# Patient Record
Sex: MALE | Race: WHITE | NOT HISPANIC OR LATINO | Employment: FULL TIME | URBAN - METROPOLITAN AREA
[De-identification: names, ages, dates, MRNs, and addresses within clinical notes are randomized per-mention and may not be internally consistent; named-entity substitution may affect disease eponyms.]

---

## 2018-02-14 LAB — HBA1C MFR BLD HPLC: 7.8 %

## 2018-04-04 DIAGNOSIS — E11.42 DIABETIC POLYNEUROPATHY ASSOCIATED WITH TYPE 2 DIABETES MELLITUS (HCC): Primary | ICD-10-CM

## 2018-04-04 RX ORDER — GABAPENTIN 300 MG/1
CAPSULE ORAL
Qty: 120 CAPSULE | Refills: 5 | Status: SHIPPED | OUTPATIENT
Start: 2018-04-04 | End: 2018-07-30 | Stop reason: SDUPTHER

## 2018-04-20 DIAGNOSIS — E11.9 TYPE 2 DIABETES MELLITUS WITHOUT COMPLICATION, WITHOUT LONG-TERM CURRENT USE OF INSULIN (HCC): Primary | ICD-10-CM

## 2018-07-21 DIAGNOSIS — E11.9 TYPE 2 DIABETES MELLITUS WITHOUT COMPLICATION, WITHOUT LONG-TERM CURRENT USE OF INSULIN (HCC): ICD-10-CM

## 2018-07-23 RX ORDER — DAPAGLIFLOZIN 10 MG/1
10 TABLET, FILM COATED ORAL DAILY
Qty: 90 TABLET | Refills: 0 | Status: SHIPPED | OUTPATIENT
Start: 2018-07-23 | End: 2018-10-08 | Stop reason: SDUPTHER

## 2018-07-30 ENCOUNTER — OFFICE VISIT (OUTPATIENT)
Dept: ENDOCRINOLOGY | Facility: HOSPITAL | Age: 53
End: 2018-07-30
Payer: COMMERCIAL

## 2018-07-30 VITALS
WEIGHT: 227.6 LBS | HEART RATE: 86 BPM | HEIGHT: 67 IN | SYSTOLIC BLOOD PRESSURE: 118 MMHG | BODY MASS INDEX: 35.72 KG/M2 | DIASTOLIC BLOOD PRESSURE: 74 MMHG

## 2018-07-30 DIAGNOSIS — E11.42 DIABETIC POLYNEUROPATHY ASSOCIATED WITH TYPE 2 DIABETES MELLITUS (HCC): ICD-10-CM

## 2018-07-30 DIAGNOSIS — E78.5 HYPERLIPIDEMIA, UNSPECIFIED HYPERLIPIDEMIA TYPE: ICD-10-CM

## 2018-07-30 DIAGNOSIS — I10 HYPERTENSION, UNSPECIFIED TYPE: ICD-10-CM

## 2018-07-30 DIAGNOSIS — E11.65 TYPE 2 DIABETES MELLITUS WITH HYPERGLYCEMIA, WITHOUT LONG-TERM CURRENT USE OF INSULIN (HCC): Primary | ICD-10-CM

## 2018-07-30 PROCEDURE — 99204 OFFICE O/P NEW MOD 45 MIN: CPT | Performed by: INTERNAL MEDICINE

## 2018-07-30 RX ORDER — GLIPIZIDE 5 MG/1
TABLET ORAL
Refills: 4 | COMMUNITY
Start: 2018-07-14 | End: 2018-11-12 | Stop reason: SDUPTHER

## 2018-07-30 RX ORDER — GABAPENTIN 300 MG/1
CAPSULE ORAL
Qty: 360 CAPSULE | Refills: 3 | Status: SHIPPED | OUTPATIENT
Start: 2018-07-30 | End: 2019-08-08 | Stop reason: SDUPTHER

## 2018-07-30 RX ORDER — LISINOPRIL AND HYDROCHLOROTHIAZIDE 12.5; 1 MG/1; MG/1
1 TABLET ORAL DAILY
COMMUNITY

## 2018-07-30 RX ORDER — FINASTERIDE 5 MG/1
5 TABLET, FILM COATED ORAL
COMMUNITY
End: 2022-04-25

## 2018-07-30 RX ORDER — SIMVASTATIN 20 MG
20 TABLET ORAL
COMMUNITY

## 2018-07-30 RX ORDER — METFORMIN HYDROCHLORIDE 500 MG/1
1000 TABLET, EXTENDED RELEASE ORAL 2 TIMES DAILY WITH MEALS
Qty: 360 TABLET | Refills: 2 | Status: SHIPPED | OUTPATIENT
Start: 2018-07-30 | End: 2019-06-09 | Stop reason: SDUPTHER

## 2018-07-30 RX ORDER — TRAMADOL HYDROCHLORIDE 50 MG/1
1 TABLET ORAL 3 TIMES DAILY PRN
COMMUNITY
Start: 2015-01-22 | End: 2022-04-25

## 2018-07-30 RX ORDER — DULOXETIN HYDROCHLORIDE 20 MG/1
20 CAPSULE, DELAYED RELEASE ORAL DAILY
COMMUNITY
Start: 2015-05-05 | End: 2022-04-25

## 2018-07-30 NOTE — LETTER
July 30, 2018     Margie Body  [address]    Patient: Franky Mcwilliams  YOB: 1965   Date of Visit: 7/30/2018       Dear Dr Lorena Chavis:    Thank you for referring Cassie Kelly to me for evaluation  Below are my notes for this consultation  If you have questions, please do not hesitate to call me  I look forward to following your patient along with you  Sincerely,        Chris Dominguez MD        CC: No Recipients  Chris Dominguez MD  7/30/2018 12:37 PM  Sign at close encounter  7/30/2018    Assessment/Plan      Diagnoses and all orders for this visit:    Type 2 diabetes mellitus with hyperglycemia, without long-term current use of insulin ([facility] [address] )  -     HEMOGLOBIN A1C W/ EAG ESTIMATION Lab Collect; Future  -     Comprehensive metabolic panel Lab Collect; Future  -     Lipid Panel with Direct LDL reflex Lab Collect; Future  -     Microalbumin / creatinine urine ratio Lab Collect; Future  -     TSH, 3rd generation Lab Collect; Future  -     metFORMIN (GLUCOPHAGE-XR) 500 mg 24 hr tablet; Take 2 tablets (1,000 mg total) by mouth 2 (two) times a day with meals    Diabetic polyneuropathy associated with type 2 diabetes mellitus ([address] )  -     HEMOGLOBIN A1C W/ EAG ESTIMATION Lab Collect; Future  -     Comprehensive metabolic panel Lab Collect; Future  -     Lipid Panel with Direct LDL reflex Lab Collect; Future  -     Microalbumin / creatinine urine ratio Lab Collect; Future  -     TSH, 3rd generation Lab Collect; Future  -     gabapentin (NEURONTIN) 300 mg capsule; Use 1 tablet 4 times a day  Hyperlipidemia, unspecified hyperlipidemia type  -     HEMOGLOBIN A1C W/ EAG ESTIMATION Lab Collect; Future  -     Comprehensive metabolic panel Lab Collect; Future  -     Lipid Panel with Direct LDL reflex Lab Collect; Future  -     Microalbumin / creatinine urine ratio Lab Collect; Future  -     TSH, 3rd generation Lab Collect;  Future    Hypertension, unspecified type  - HEMOGLOBIN A1C W/ EAG ESTIMATION Lab Collect; Future  -     Comprehensive metabolic panel Lab Collect; Future  -     Lipid Panel with Direct LDL reflex Lab Collect; Future  -     Microalbumin / creatinine urine ratio Lab Collect; Future  -     TSH, 3rd generation Lab Collect; Future    Other orders  -     traMADol (ULTRAM) 50 mg tablet; Take 1 tablet by mouth 3 (three) times a day as needed  -     simvastatin (ZOCOR) 20 mg tablet; Take 20 mg by mouth    -     Discontinue: metFORMIN (GLUCOPHAGE) 1000 MG tablet; Take 1,000 mg by mouth 2 (two) times a day with meals    -     lisinopril-hydrochlorothiazide (PRINZIDE,ZESTORETIC) 10-12 5 MG per tablet; Take 1 tablet by mouth daily    -     glipiZIDE (GLUCOTROL) 5 mg tablet; TAKE 1 TABLET BY MOUTH IN THE MORNING AND 2 TABLETS IN THE EVENING  -     finasteride (PROSCAR) 5 mg tablet; Take 5 mg by mouth    -     DULoxetine (CYMBALTA) 20 mg capsule; Take 20 mg by mouth daily          Assessment/Plan:  1  Type 2 diabetes under fair control as of 2/2018  I will change his metfomin to the XR version to see if it helps his GI issues  If not, he can stop the metformin, but then he would be on more expensive medications and possibly insulin,   He was made aware of this fact and if GI symptoms are not too bad, may want to just continue the metformin  He will continue the same glipizide and [place] for now  2   Diabetic neuropathy  He has pretty significant symptoms of diabetic neuropathy in decrease in sensation on exam   He is on gabapentin 300 mg 4 times a day and Cymbalta 20 mg daily along with Tramadol  He will continue these for now the same way  3   Hypertension  His blood pressure is under good control on his current lisinopril/HCTZ  4   Hyperlipidemia  He is on simvastatin 20 mg daily  I will repeat his lipid profile next visit  He will follow up in 3 months with preceding hemoglobin A1c, CMP, lipid profile, TSH, and urine microalbumin to creatinine ratio        CC: Diabetes Consult    History of Present Illness     HPI: Julienne López  is a 46y o  year old male with type 2 diabetes for 3 years  He is on oral agents at home and takes farxiga 10 mg daily, metformin 1000 mg BID, and glipizide 5 mg 1 in am and 2 in pm   He denies any polyphagia, polydipsia, and blurry vision  He has polyuria and twice a night nocturia  He denies chest pain or shortness of breath  He has chronic numbness and tingling and pain and burning of his feet  His Gabapentin has improve the symptoms     He denies nephropathy, retinopathy, heart attack, stroke and claudication but does admit to neuropathy  Hypoglycemic episodes: No never  H/o of hypoglycemia causing hospitalization or intervention such as glucagon injection  or ambulance call  No   Hypoglycemia symptoms: never had one  Treatment of hypoglycemia: would drink juice  Glucagon:No   Medic alert tag: recommended,Yes  The patient's last eye exam was in about 1 year ago or so with no retinopathy  The patient's last foot exam was in not seen  Last A1C was 7 8% on 02/14/2018  Blood Sugar/Glucometer/Pump/CGM review: does not check blood sugars  Before breakfast:   Before lunch:   Before dinner:   Bedtime:     Review of Systems   Constitutional: Negative for fatigue and unexpected weight change  HENT: Negative for hearing loss and tinnitus  Eyes: Negative for visual disturbance  Respiratory: Negative for chest tightness and shortness of breath  Cardiovascular: Negative for chest pain, palpitations and leg swelling  Gastrointestinal: Positive for abdominal pain and diarrhea  Negative for constipation and nausea  Had been given a long acting pain medicine that caused constipation followed by diarrhea and stomach rumbling  He is back on tramadol and 90% better  Still has some abdominal rumbling and pain and more loose/mushy stools and some diarrhea  He has significant gassiness  He saw GI doc   Tried off diary and helped some  Endocrine: Positive for polyuria  Negative for polydipsia and polyphagia  Nocturia 2 times a night  Musculoskeletal: Positive for arthralgias and back pain  Has bilateral foot pain and left shoulder pain  Has low back pain  Skin: Negative for rash and wound  Neurological: Positive for numbness  Negative for dizziness, tremors, light-headedness and headaches  Numbness and tingling and pain and burning pain up calves  Psychiatric/Behavioral: Positive for sleep disturbance  Negative for dysphoric mood  The patient is not nervous/anxious  Up to urinate  Historical Information   Past Medical History:   Diagnosis Date    BPH (benign prostatic hyperplasia)     Lumbar herniated disc     Osteoarthritis      No past surgical history on file  Social History   History   Alcohol Use    Yes     Comment: occasional     History   Drug Use No     History   Smoking Status    Current Every Day Smoker    Types: Cigarettes   Smokeless Tobacco    Never Used     Family History:   Family History   Problem Relation Age of Onset    No Known Problems Mother     Diabetes unspecified Father     Heart disease Father         post CABG    No Known Problems Brother        Meds/Allergies   Current Outpatient Prescriptions   Medication Sig Dispense Refill    DULoxetine (CYMBALTA) 20 mg capsule Take 20 mg by mouth daily        FARXIGA 10 MG TABS TAKE 1 TABLET (10 MG TOTAL) BY MOUTH DAILY 90 tablet 0    finasteride (PROSCAR) 5 mg tablet Take 5 mg by mouth        gabapentin (NEURONTIN) 300 mg capsule Use 1 tablet 4 times a day   360 capsule 3    glipiZIDE (GLUCOTROL) 5 mg tablet TAKE 1 TABLET BY MOUTH IN THE MORNING AND 2 TABLETS IN THE EVENING  4    lisinopril-hydrochlorothiazide (PRINZIDE,ZESTORETIC) 10-12 5 MG per tablet Take 1 tablet by mouth daily        simvastatin (ZOCOR) 20 mg tablet Take 20 mg by mouth        traMADol (ULTRAM) 50 mg tablet Take 1 tablet by mouth 3 (three) times a day as needed      metFORMIN (GLUCOPHAGE-XR) 500 mg 24 hr tablet Take 2 tablets (1,000 mg total) by mouth 2 (two) times a day with meals 360 tablet 2     No current facility-administered medications for this visit  No Known Allergies    Objective   Vitals: Blood pressure 118/74, pulse 86, height 5' 7" (1 702 m), weight 103 kg (227 lb 9 6 oz)  Invasive Devices          No matching active lines, drains, or airways          Physical Exam   Constitutional: He is oriented to person, place, and time  He appears well-developed and well-nourished  HENT:   Head: Normocephalic and atraumatic  Eyes: Conjunctivae and EOM are normal  Pupils are equal, round, and reactive to light  No lid lag, stare, proptosis, or periorbital edema  Neck: Normal range of motion  Neck supple  No thyromegaly present  No carotid bruits  Cardiovascular: Normal rate, regular rhythm, normal heart sounds and intact distal pulses  Pulses are no weak pulses  No murmur heard  Pulses:       Dorsalis pedis pulses are 2+ on the right side, and 2+ on the left side  Posterior tibial pulses are 2+ on the right side, and 2+ on the left side  Pulmonary/Chest: Effort normal and breath sounds normal  He has no wheezes  Abdominal: Soft  Bowel sounds are normal  There is no tenderness  Musculoskeletal: Normal range of motion  He exhibits no edema or deformity  No ulceration of the feet  No tremor of the outstretched hands  No spinous process tenderness  No CVA tenderness  Feet:   Right Foot:   Skin Integrity: Negative for ulcer, skin breakdown, erythema, warmth, callus or dry skin  Left Foot:   Skin Integrity: Negative for ulcer, skin breakdown, erythema, warmth, callus or dry skin  Lymphadenopathy:     He has no cervical adenopathy  Neurological: He is alert and oriented to person, place, and time  He has normal reflexes  Vibration sensation near absent to the bilateral 1st toe DIP joints  Microfilament sensation absent except to the arch on the left  Achilles tendon reflexes diminished bilateral    Skin: Skin is warm and dry  No rash noted  No erythema  Vitals reviewed  Patient's shoes and socks removed  Right Foot/Ankle   Right Foot Inspection  Skin Exam: skin normal and skin intact no dry skin, no warmth, no callus, no erythema, no maceration, no abnormal color, no pre-ulcer, no ulcer and no callus                          Toe Exam: ROM and strength within normal limits  Sensory   Vibration: diminished  Proprioception: intact   Monofilament testing: absent  Vascular  Capillary refills: < 3 seconds  The right DP pulse is 2+  The right PT pulse is 2+  Left Foot/Ankle  Left Foot Inspection  Skin Exam: skin normal and skin intactno dry skin, no warmth, no erythema, no maceration, normal color, no pre-ulcer, no ulcer and no callus                         Toe Exam: ROM and strength within normal limits                   Sensory   Vibration: diminished  Proprioception: intact  Monofilament: absent  Vascular  Capillary refills: < 3 seconds  The left DP pulse is 2+  The left PT pulse is 2+  Assign Risk Category:  No deformity present; Loss of protective sensation; No weak pulses       Risk: 1      The history was obtained from the review of the chart and from the patient  Lab Results:   Blood work done on 02/14/2018 at [address] showed a CMP with a glucose of 247 random but was otherwise normal   Hemoglobin A1c was 7 8%  No results found for this or any previous visit (from the past 36654 hour(s))        Future Appointments  Date Time Provider Merle Hunt   11/12/2018 8:40 AM Manav Weathers, [address] [facility]

## 2018-07-30 NOTE — PATIENT INSTRUCTIONS
Hgba1c 7 8%  This is still too high  No change in medicine amounts, let's try the extended release metformin to see if it helps the stomach symptoms  Change to metformin  mg 2 tablets twice a day  Follow up in 3 months with blood work

## 2018-07-30 NOTE — PROGRESS NOTES
7/30/2018    Assessment/Plan      Diagnoses and all orders for this visit:    Type 2 diabetes mellitus with hyperglycemia, without long-term current use of insulin ([place] [address] )  -     HEMOGLOBIN A1C W/ EAG ESTIMATION Lab Collect; Future  -     Comprehensive metabolic panel Lab Collect; Future  -     Lipid Panel with Direct LDL reflex Lab Collect; Future  -     Microalbumin / creatinine urine ratio Lab Collect; Future  -     TSH, 3rd generation Lab Collect; Future  -     metFORMIN (GLUCOPHAGE-XR) 500 mg 24 hr tablet; Take 2 tablets (1,000 mg total) by mouth 2 (two) times a day with meals    Diabetic polyneuropathy associated with type 2 diabetes mellitus ([place] [address] )  -     HEMOGLOBIN A1C W/ EAG ESTIMATION Lab Collect; Future  -     Comprehensive metabolic panel Lab Collect; Future  -     Lipid Panel with Direct LDL reflex Lab Collect; Future  -     Microalbumin / creatinine urine ratio Lab Collect; Future  -     TSH, 3rd generation Lab Collect; Future  -     gabapentin (NEURONTIN) 300 mg capsule; Use 1 tablet 4 times a day  Hyperlipidemia, unspecified hyperlipidemia type  -     HEMOGLOBIN A1C W/ EAG ESTIMATION Lab Collect; Future  -     Comprehensive metabolic panel Lab Collect; Future  -     Lipid Panel with Direct LDL reflex Lab Collect; Future  -     Microalbumin / creatinine urine ratio Lab Collect; Future  -     TSH, 3rd generation Lab Collect; Future    Hypertension, unspecified type  -     HEMOGLOBIN A1C W/ EAG ESTIMATION Lab Collect; Future  -     Comprehensive metabolic panel Lab Collect; Future  -     Lipid Panel with Direct LDL reflex Lab Collect; Future  -     Microalbumin / creatinine urine ratio Lab Collect; Future  -     TSH, 3rd generation Lab Collect; Future    Other orders  -     traMADol (ULTRAM) 50 mg tablet; Take 1 tablet by mouth 3 (three) times a day as needed  -     simvastatin (ZOCOR) 20 mg tablet; Take 20 mg by mouth    -     Discontinue: metFORMIN (GLUCOPHAGE) 1000 MG tablet;  Take 1,000 mg by mouth 2 (two) times a day with meals    -     lisinopril-hydrochlorothiazide (PRINZIDE,ZESTORETIC) 10-12 5 MG per tablet; Take 1 tablet by mouth daily    -     glipiZIDE (GLUCOTROL) 5 mg tablet; TAKE 1 TABLET BY MOUTH IN THE MORNING AND 2 TABLETS IN THE EVENING  -     finasteride (PROSCAR) 5 mg tablet; Take 5 mg by mouth    -     DULoxetine (CYMBALTA) 20 mg capsule; Take 20 mg by mouth daily          Assessment/Plan:  1  Type 2 diabetes under fair control as of 2/2018  I will change his metfomin to the XR version to see if it helps his GI issues  If not, he can stop the metformin, but then he would be on more expensive medications and possibly insulin,   He was made aware of this fact and if GI symptoms are not too bad, may want to just continue the metformin  He will continue the same glipizide and [place] for now  2   Diabetic neuropathy  He has pretty significant symptoms of diabetic neuropathy in decrease in sensation on exam   He is on gabapentin 300 mg 4 times a day and Cymbalta 20 mg daily along with Tramadol  He will continue these for now the same way  3   Hypertension  His blood pressure is under good control on his current lisinopril/HCTZ  4   Hyperlipidemia  He is on simvastatin 20 mg daily  I will repeat his lipid profile next visit  He will follow up in 3 months with preceding hemoglobin A1c, CMP, lipid profile, TSH, and urine microalbumin to creatinine ratio  CC: Diabetes Consult    History of Present Illness     HPI: Wilmer Garcia  is a 46y o  year old male with type 2 diabetes for 3 years  He is on oral agents at home and takes farxiga 10 mg daily, metformin 1000 mg BID, and glipizide 5 mg 1 in am and 2 in pm   He denies any polyphagia, polydipsia, and blurry vision  He has polyuria and twice a night nocturia  He denies chest pain or shortness of breath  He has chronic numbness and tingling and pain and burning of his feet  His Gabapentin has improve the symptoms     He denies nephropathy, retinopathy, heart attack, stroke and claudication but does admit to neuropathy  Hypoglycemic episodes: No never  H/o of hypoglycemia causing hospitalization or intervention such as glucagon injection  or ambulance call  No   Hypoglycemia symptoms: never had one  Treatment of hypoglycemia: would drink juice  Glucagon:No   Medic alert tag: recommended,Yes  The patient's last eye exam was in about 1 year ago or so with no retinopathy  The patient's last foot exam was in not seen  Last A1C was 7 8% on 02/14/2018  Blood Sugar/Glucometer/Pump/CGM review: does not check blood sugars  Before breakfast:   Before lunch:   Before dinner:   Bedtime:     Review of Systems   Constitutional: Negative for fatigue and unexpected weight change  HENT: Negative for hearing loss and tinnitus  Eyes: Negative for visual disturbance  Respiratory: Negative for chest tightness and shortness of breath  Cardiovascular: Negative for chest pain, palpitations and leg swelling  Gastrointestinal: Positive for abdominal pain and diarrhea  Negative for constipation and nausea  Had been given a long acting pain medicine that caused constipation followed by diarrhea and stomach rumbling  He is back on tramadol and 90% better  Still has some abdominal rumbling and pain and more loose/mushy stools and some diarrhea  He has significant gassiness  He saw GI doc  Tried off diary and helped some  Endocrine: Positive for polyuria  Negative for polydipsia and polyphagia  Nocturia 2 times a night  Musculoskeletal: Positive for arthralgias and back pain  Has bilateral foot pain and left shoulder pain  Has low back pain  Skin: Negative for rash and wound  Neurological: Positive for numbness  Negative for dizziness, tremors, light-headedness and headaches  Numbness and tingling and pain and burning pain up calves  Psychiatric/Behavioral: Positive for sleep disturbance   Negative for dysphoric mood  The patient is not nervous/anxious  Up to urinate  Historical Information   Past Medical History:   Diagnosis Date    BPH (benign prostatic hyperplasia)     Lumbar herniated disc     Osteoarthritis      No past surgical history on file  Social History   History   Alcohol Use    Yes     Comment: occasional     History   Drug Use No     History   Smoking Status    Current Every Day Smoker    Types: Cigarettes   Smokeless Tobacco    Never Used     Family History:   Family History   Problem Relation Age of Onset    No Known Problems Mother     Diabetes unspecified Father     Heart disease Father         post CABG    No Known Problems Brother        Meds/Allergies   Current Outpatient Prescriptions   Medication Sig Dispense Refill    DULoxetine (CYMBALTA) 20 mg capsule Take 20 mg by mouth daily        FARXIGA 10 MG TABS TAKE 1 TABLET (10 MG TOTAL) BY MOUTH DAILY 90 tablet 0    finasteride (PROSCAR) 5 mg tablet Take 5 mg by mouth        gabapentin (NEURONTIN) 300 mg capsule Use 1 tablet 4 times a day  360 capsule 3    glipiZIDE (GLUCOTROL) 5 mg tablet TAKE 1 TABLET BY MOUTH IN THE MORNING AND 2 TABLETS IN THE EVENING  4    lisinopril-hydrochlorothiazide (PRINZIDE,ZESTORETIC) 10-12 5 MG per tablet Take 1 tablet by mouth daily        simvastatin (ZOCOR) 20 mg tablet Take 20 mg by mouth        traMADol (ULTRAM) 50 mg tablet Take 1 tablet by mouth 3 (three) times a day as needed      metFORMIN (GLUCOPHAGE-XR) 500 mg 24 hr tablet Take 2 tablets (1,000 mg total) by mouth 2 (two) times a day with meals 360 tablet 2     No current facility-administered medications for this visit  No Known Allergies    Objective   Vitals: Blood pressure 118/74, pulse 86, height 5' 7" (1 702 m), weight 103 kg (227 lb 9 6 oz)  Invasive Devices          No matching active lines, drains, or airways          Physical Exam   Constitutional: He is oriented to person, place, and time   He appears well-developed and well-nourished  HENT:   Head: Normocephalic and atraumatic  Eyes: Conjunctivae and EOM are normal  Pupils are equal, round, and reactive to light  No lid lag, stare, proptosis, or periorbital edema  Neck: Normal range of motion  Neck supple  No thyromegaly present  No carotid bruits  Cardiovascular: Normal rate, regular rhythm, normal heart sounds and intact distal pulses  Pulses are no weak pulses  No murmur heard  Pulses:       Dorsalis pedis pulses are 2+ on the right side, and 2+ on the left side  Posterior tibial pulses are 2+ on the right side, and 2+ on the left side  Pulmonary/Chest: Effort normal and breath sounds normal  He has no wheezes  Abdominal: Soft  Bowel sounds are normal  There is no tenderness  Musculoskeletal: Normal range of motion  He exhibits no edema or deformity  No ulceration of the feet  No tremor of the outstretched hands  No spinous process tenderness  No CVA tenderness  Feet:   Right Foot:   Skin Integrity: Negative for ulcer, skin breakdown, erythema, warmth, callus or dry skin  Left Foot:   Skin Integrity: Negative for ulcer, skin breakdown, erythema, warmth, callus or dry skin  Lymphadenopathy:     He has no cervical adenopathy  Neurological: He is alert and oriented to person, place, and time  He has normal reflexes  Vibration sensation near absent to the bilateral 1st toe DIP joints  Microfilament sensation absent except to the arch on the left  Achilles tendon reflexes diminished bilateral    Skin: Skin is warm and dry  No rash noted  No erythema  Vitals reviewed  Patient's shoes and socks removed  Right Foot/Ankle   Right Foot Inspection  Skin Exam: skin normal and skin intact no dry skin, no warmth, no callus, no erythema, no maceration, no abnormal color, no pre-ulcer, no ulcer and no callus                          Toe Exam: ROM and strength within normal limits  Sensory   Vibration: diminished  Proprioception: intact   Monofilament testing: absent  Vascular  Capillary refills: < 3 seconds  The right DP pulse is 2+  The right PT pulse is 2+  Left Foot/Ankle  Left Foot Inspection  Skin Exam: skin normal and skin intactno dry skin, no warmth, no erythema, no maceration, normal color, no pre-ulcer, no ulcer and no callus                         Toe Exam: ROM and strength within normal limits                   Sensory   Vibration: diminished  Proprioception: intact  Monofilament: absent  Vascular  Capillary refills: < 3 seconds  The left DP pulse is 2+  The left PT pulse is 2+  Assign Risk Category:  No deformity present; Loss of protective sensation; No weak pulses       Risk: 1      The history was obtained from the review of the chart and from the patient  Lab Results:   Blood work done on 02/14/2018 at [facility] showed a CMP with a glucose of 247 random but was otherwise normal   Hemoglobin A1c was 7 8%  No results found for this or any previous visit (from the past 19979 hour(s))        Future Appointments  Date Time Provider Merle Hunt   11/12/2018 8:40 AM Job Downs, [address] [facility]

## 2018-10-08 DIAGNOSIS — E11.9 TYPE 2 DIABETES MELLITUS WITHOUT COMPLICATION, WITHOUT LONG-TERM CURRENT USE OF INSULIN (HCC): ICD-10-CM

## 2018-10-09 RX ORDER — DAPAGLIFLOZIN 10 MG/1
10 TABLET, FILM COATED ORAL DAILY
Qty: 90 TABLET | Refills: 0 | Status: SHIPPED | OUTPATIENT
Start: 2018-10-09 | End: 2019-01-03 | Stop reason: SDUPTHER

## 2018-10-17 LAB
CHOLEST SERPL-MCNC: 179 MG/DL
CHOLEST/HDLC SERPL: 7.5 (CALC)
EST. AVERAGE GLUCOSE BLD GHB EST-MCNC: 223 (CALC)
EST. AVERAGE GLUCOSE BLD GHB EST-SCNC: 12.4 (CALC)
HBA1C MFR BLD: 9.4 % OF TOTAL HGB
HDLC SERPL-MCNC: 24 MG/DL
LDLC SERPL DIRECT ASSAY-MCNC: 76 MG/DL
NONHDLC SERPL-MCNC: 155 MG/DL (CALC)
TRIGL SERPL-MCNC: 754 MG/DL
TSH SERPL-ACNC: 1.73 MIU/L (ref 0.4–4.5)

## 2018-10-22 LAB
ALBUMIN/CREAT UR: NORMAL MCG/MG CREAT
CREAT UR-MCNC: 37 MG/DL (ref 20–320)
MICROALBUMIN UR-MCNC: <0.2 MG/DL

## 2018-11-12 ENCOUNTER — OFFICE VISIT (OUTPATIENT)
Dept: ENDOCRINOLOGY | Facility: HOSPITAL | Age: 53
End: 2018-11-12
Payer: COMMERCIAL

## 2018-11-12 VITALS
HEART RATE: 92 BPM | HEIGHT: 67 IN | SYSTOLIC BLOOD PRESSURE: 112 MMHG | DIASTOLIC BLOOD PRESSURE: 80 MMHG | BODY MASS INDEX: 36.6 KG/M2 | WEIGHT: 233.2 LBS

## 2018-11-12 DIAGNOSIS — I10 HYPERTENSION, UNSPECIFIED TYPE: ICD-10-CM

## 2018-11-12 DIAGNOSIS — E78.5 HYPERLIPIDEMIA, UNSPECIFIED HYPERLIPIDEMIA TYPE: ICD-10-CM

## 2018-11-12 DIAGNOSIS — E11.42 DIABETIC POLYNEUROPATHY ASSOCIATED WITH TYPE 2 DIABETES MELLITUS (HCC): ICD-10-CM

## 2018-11-12 DIAGNOSIS — E11.65 TYPE 2 DIABETES MELLITUS WITH HYPERGLYCEMIA, WITHOUT LONG-TERM CURRENT USE OF INSULIN (HCC): Primary | ICD-10-CM

## 2018-11-12 PROCEDURE — 99214 OFFICE O/P EST MOD 30 MIN: CPT | Performed by: INTERNAL MEDICINE

## 2018-11-12 RX ORDER — GLIPIZIDE 5 MG/1
TABLET ORAL
Qty: 360 TABLET | Refills: 3 | Status: SHIPPED | OUTPATIENT
Start: 2018-11-12 | End: 2019-12-10 | Stop reason: SDUPTHER

## 2018-11-12 NOTE — PROGRESS NOTES
11/12/2018    Assessment/Plan      Diagnoses and all orders for this visit:    Type 2 diabetes mellitus with hyperglycemia, without long-term current use of insulin (HCC)  -     glipiZIDE (GLUCOTROL) 5 mg tablet; 2 tablets twice a day  -     HEMOGLOBIN A1C W/ EAG ESTIMATION Lab Collect; Future  -     Comprehensive metabolic panel Lab Collect; Future    Diabetic polyneuropathy associated with type 2 diabetes mellitus (HCC)  -     HEMOGLOBIN A1C W/ EAG ESTIMATION Lab Collect; Future  -     Comprehensive metabolic panel Lab Collect; Future    Hyperlipidemia, unspecified hyperlipidemia type  -     HEMOGLOBIN A1C W/ EAG ESTIMATION Lab Collect; Future  -     Comprehensive metabolic panel Lab Collect; Future    Hypertension, unspecified type  -     HEMOGLOBIN A1C W/ EAG ESTIMATION Lab Collect; Future  -     Comprehensive metabolic panel Lab Collect; Future        Assessment/Plan:  1  Type 2 diabetes  His most recent hemoglobin A1c is horrible at 9 4%  This has gone up quite a bit from 7 8% last visit  He admits to dietary indiscretion and gaining weight and lack of activity  We did switch him from immediate release metformin to metformin XR but at the same dosage  At this point, he will continue the same metformin and farxiga, but I will increase his glipizide to 5 mg 2 tablets or 10 mg twice a day  He is going to work on diet and activity to try to bring his blood sugars down  I warned him that if they do not come down, he may need insulin therapy  He was offered diabetic education with medical nutrition therapy but declined  I have also asked him to follow up with an ophthalmologist as he is overdue for that visit  2   Diabetic neuropathy  Diabetic nerve symptoms are stable and he is currently on gabapentin and Cymbalta  3   Hypertension  Blood pressure is under excellent control on his current dose of lisinopril/HCTZ  4   Hyperlipidemia with hypertriglyceridemia    Triglycerides are still markedly elevated at over 700  Part of this is likely due to his elevated blood sugars  He is already on simvastatin 20 mg daily  We may need to consider adding of Fibric acid  At the moment, I have asked him to start fish oil 1000 mg 2 capsules daily  I have asked him to follow up in 3 months with preceding hemoglobin A1c and CMP  CC: Diabetes Type 2 Follow up    History of Present Illness     HPI: Brenda Triana  is a 46y o  year old male with type 2 diabetes for 3 years  He is on oral agents at home and takes metformin  mg 2 tablets twice a day, glipizide 5 mg 1 tab in the morning and 2 tablets in the evening, and Farxiga 10 mg daily  He admits to some polyuria, polydipsia and polyphagia at times, and nocturia twice a night nocturia  He denies chest pain or shortness of breath, or blurry vision  He has numbness and tingling of his feet with pain and burning which is chronic and unchanged  He denies nephropathy, retinopathy, heart attack, stroke and claudication but does admit to neuropathy  Went back  Extended release 1000 mg BID 1 1/2 to  2 months ago and GI symptoms not as bad  No illnesses or change in the meds  Thinks he is eating more  Walking a lot at work  He is "putting on winter weight now"  He has hypertension and currently takes lisinopril/HCTZ 10/12 5 mg daily  He denies headache or stroke-like symptoms  He has hyperlipidemia with hypertriglyceridemia and is currently taking simvastatin 20 mg daily  He denies chest pain, shortness of breath, headache, or myalgias  Hypoglycemic episodes: No never  The patient's last eye exam was in about 2-3 years ago     The patient's last foot exam was in not seen  Last A1C was done on 10/16/2018 and was  Lab Results   Component Value Date    HGBA1C 9 4 (H) 10/16/2018         Blood Sugar/Glucometer/Pump/CGM review: not checking blood sugars  Before breakfast:   Before lunch:   Before dinner:   Bedtime:     Review of Systems Constitutional: Negative for fatigue and unexpected weight change  5 lbs more than last visit  HENT: Negative for hearing loss  Eyes: Negative for visual disturbance  Wears glasses  Respiratory: Negative for chest tightness and shortness of breath  Cardiovascular: Negative for chest pain  Gastrointestinal: Negative for abdominal pain, constipation, diarrhea and nausea  Just stomach rumbling and pain at times and less than on immediate release metformin  Endocrine: Positive for polydipsia and polyuria  Negative for polyphagia  Nocturia twice a night  Can have thirst and hunger at times  Musculoskeletal: Negative for arthralgias and back pain  Skin: Negative for wound  Neurological: Positive for numbness  Has chronic numbness and tingling of the feet  He has pain and burning too  Can be stabbing pain  Has some symptoms in the hands too  Psychiatric/Behavioral: Negative for sleep disturbance  Historical Information   Past Medical History:   Diagnosis Date    BPH (benign prostatic hyperplasia)     Lumbar herniated disc     Osteoarthritis      No past surgical history on file  Social History   History   Alcohol Use    Yes     Comment: occasional     History   Drug Use No     History   Smoking Status    Current Every Day Smoker    Types: Cigarettes   Smokeless Tobacco    Never Used     Family History:   Family History   Problem Relation Age of Onset    No Known Problems Mother     Diabetes unspecified Father     Heart disease Father         post CABG    No Known Problems Brother        Meds/Allergies   Current Outpatient Prescriptions   Medication Sig Dispense Refill    DULoxetine (CYMBALTA) 20 mg capsule Take 20 mg by mouth daily        FARXIGA 10 MG TABS TAKE 1 TABLET (10 MG TOTAL) BY MOUTH DAILY 90 tablet 0    gabapentin (NEURONTIN) 300 mg capsule Use 1 tablet 4 times a day   360 capsule 3    glipiZIDE (GLUCOTROL) 5 mg tablet 2 tablets twice a day 360 tablet 3    lisinopril-hydrochlorothiazide (PRINZIDE,ZESTORETIC) 10-12 5 MG per tablet Take 1 tablet by mouth daily        metFORMIN (GLUCOPHAGE-XR) 500 mg 24 hr tablet Take 2 tablets (1,000 mg total) by mouth 2 (two) times a day with meals 360 tablet 2    simvastatin (ZOCOR) 20 mg tablet Take 20 mg by mouth        traMADol (ULTRAM) 50 mg tablet Take 1 tablet by mouth 3 (three) times a day as needed      finasteride (PROSCAR) 5 mg tablet Take 5 mg by mouth         No current facility-administered medications for this visit  No Known Allergies    Objective   Vitals: Blood pressure 112/80, pulse 92, height 5' 7" (1 702 m), weight 106 kg (233 lb 3 2 oz)  Invasive Devices          No matching active lines, drains, or airways          Physical Exam   Constitutional: He is oriented to person, place, and time  He appears well-developed and well-nourished  HENT:   Head: Normocephalic and atraumatic  Eyes: Pupils are equal, round, and reactive to light  Conjunctivae and EOM are normal    Neck: Normal range of motion  Neck supple  No thyromegaly present  No bruits of the carotids  Cardiovascular: Normal rate, regular rhythm, normal heart sounds and intact distal pulses  No murmur heard  Pulmonary/Chest: Effort normal and breath sounds normal  He has no wheezes  Musculoskeletal: Normal range of motion  He exhibits no edema or deformity  No ulceration of the feet when examined with the shoes and socks removed  Lymphadenopathy:     He has no cervical adenopathy  Neurological: He is alert and oriented to person, place, and time  Skin: Skin is warm and dry  No rash noted  No erythema  Vitals reviewed  The history was obtained from the review of the chart and from the patient      Lab Results:    Most recent Alc is  Lab Results   Component Value Date    HGBA1C 9 4 (H) 10/16/2018             Lab Results   Component Value Date    HDL 24 (L) 10/16/2018    TRIG 754 (H) 10/16/2018 Total cholesterol was 179 and LDL cholesterol was 76  Urine microalbumin to creatinine ratio was less than 0 2 mg/dL      Lab Results   Component Value Date    TSH 1 73 10/16/2018     Future Appointments  Date Time Provider Merle Hunt   3/7/2019 8:20 AM Lindsay Eaton MD [facility]

## 2018-11-12 NOTE — PATIENT INSTRUCTIONS
Hgba1c is 9 4%  It was 7 8%  This is a very big increase  Increase glipizide to 5 mg 2 tablets twice a day  continue farxiga 10 mg daily and metformin 1000 mg twice a day  Need to work on diet with portion control and carbohydrate limiting and exercise regularly at least 3 times a week  Follow up in 3 months with blood work  Triglycerides are still high partly due to the high blood sugars  You could try 2 fish oil capsules 1000 -1200 mg a day

## 2018-11-12 NOTE — LETTER
November 12, 2018     Tanika Eulalia  [address]    Patient: Virginia Stapleton  YOB: 1965   Date of Visit: 11/12/2018       Dear Dr Ainsley Huber:    Thank you for referring Jodie Fuller to me for evaluation  Below are my notes for this consultation  If you have questions, please do not hesitate to call me  I look forward to following your patient along with you  Sincerely,        Janelle Gordon MD        CC: No Recipients  Janelle Gordon MD  11/12/2018 12:17 PM  Sign at close encounter  11/12/2018    Assessment/Plan      Diagnoses and all orders for this visit:    Type 2 diabetes mellitus with hyperglycemia, without long-term current use of insulin (HCC)  -     glipiZIDE (GLUCOTROL) 5 mg tablet; 2 tablets twice a day  -     HEMOGLOBIN A1C W/ EAG ESTIMATION Lab Collect; Future  -     Comprehensive metabolic panel Lab Collect; Future    Diabetic polyneuropathy associated with type 2 diabetes mellitus (HCC)  -     HEMOGLOBIN A1C W/ EAG ESTIMATION Lab Collect; Future  -     Comprehensive metabolic panel Lab Collect; Future    Hyperlipidemia, unspecified hyperlipidemia type  -     HEMOGLOBIN A1C W/ EAG ESTIMATION Lab Collect; Future  -     Comprehensive metabolic panel Lab Collect; Future    Hypertension, unspecified type  -     HEMOGLOBIN A1C W/ EAG ESTIMATION Lab Collect; Future  -     Comprehensive metabolic panel Lab Collect; Future        Assessment/Plan:  1  Type 2 diabetes  His most recent hemoglobin A1c is horrible at 9 4%  This has gone up quite a bit from 7 8% last visit  He admits to dietary indiscretion and gaining weight and lack of activity  We did switch him from immediate release metformin to metformin XR but at the same dosage  At this point, he will continue the same metformin and farxiga, but I will increase his glipizide to 5 mg 2 tablets or 10 mg twice a day  He is going to work on diet and activity to try to bring his blood sugars down    I warned him that if they do not come down, he may need insulin therapy  He was offered diabetic education with medical nutrition therapy but declined  I have also asked him to follow up with an ophthalmologist as he is overdue for that visit  2   Diabetic neuropathy  Diabetic nerve symptoms are stable and he is currently on gabapentin and Cymbalta  3   Hypertension  Blood pressure is under excellent control on his current dose of lisinopril/HCTZ  4   Hyperlipidemia with hypertriglyceridemia  Triglycerides are still markedly elevated at over 700  Part of this is likely due to his elevated blood sugars  He is already on simvastatin 20 mg daily  We may need to consider adding of Fibric acid  At the moment, I have asked him to start fish oil 1000 mg 2 capsules daily  I have asked him to follow up in 3 months with preceding hemoglobin A1c and CMP  CC: Diabetes Type 2 Follow up    History of Present Illness     HPI: Margaret Andino  is a 46y o  year old male with type 2 diabetes for 3 years  He is on oral agents at home and takes metformin  mg 2 tablets twice a day, glipizide 5 mg 1 tab in the morning and 2 tablets in the evening, and Farxiga 10 mg daily  He admits to some polyuria, polydipsia and polyphagia at times, and nocturia twice a night nocturia  He denies chest pain or shortness of breath, or blurry vision  He has numbness and tingling of his feet with pain and burning which is chronic and unchanged  He denies nephropathy, retinopathy, heart attack, stroke and claudication but does admit to neuropathy  Went back  Extended release 1000 mg BID 1 1/2 to  2 months ago and GI symptoms not as bad  No illnesses or change in the meds  Thinks he is eating more  Walking a lot at work  He is "putting on winter weight now"  He has hypertension and currently takes lisinopril/HCTZ 10/12 5 mg daily  He denies headache or stroke-like symptoms    He has hyperlipidemia with hypertriglyceridemia and is currently taking simvastatin 20 mg daily  He denies chest pain, shortness of breath, headache, or myalgias  Hypoglycemic episodes: No never  The patient's last eye exam was in about 2-3 years ago     The patient's last foot exam was in not seen  Last A1C was done on 10/16/2018 and was  Lab Results   Component Value Date    HGBA1C 9 4 (H) 10/16/2018     Blood Sugar/Glucometer/Pump/CGM review: not checking blood sugars  Before breakfast:   Before lunch:   Before dinner:   Bedtime:     Review of Systems   Constitutional: Negative for fatigue and unexpected weight change  5 lbs more than last visit  HENT: Negative for hearing loss  Eyes: Negative for visual disturbance  Wears glasses  Respiratory: Negative for chest tightness and shortness of breath  Cardiovascular: Negative for chest pain  Gastrointestinal: Negative for abdominal pain, constipation, diarrhea and nausea  Just stomach rumbling and pain at times and less than on immediate release metformin  Endocrine: Positive for polydipsia and polyuria  Negative for polyphagia  Nocturia twice a night  Can have thirst and hunger at times  Musculoskeletal: Negative for arthralgias and back pain  Skin: Negative for wound  Neurological: Positive for numbness  Has chronic numbness and tingling of the feet  He has pain and burning too  Can be stabbing pain  Has some symptoms in the hands too  Psychiatric/Behavioral: Negative for sleep disturbance  Historical Information   Past Medical History:   Diagnosis Date    BPH (benign prostatic hyperplasia)     Lumbar herniated disc     Osteoarthritis      No past surgical history on file    Social History   History   Alcohol Use    Yes     Comment: occasional     History   Drug Use No     History   Smoking Status    Current Every Day Smoker    Types: Cigarettes   Smokeless Tobacco    Never Used     Family History:   Family History   Problem Relation Age of Onset    No Known Problems Mother     Diabetes unspecified Father     Heart disease Father         post CABG    No Known Problems Brother        Meds/Allergies   Current Outpatient Prescriptions   Medication Sig Dispense Refill    DULoxetine (CYMBALTA) 20 mg capsule Take 20 mg by mouth daily        FARXIGA 10 MG TABS TAKE 1 TABLET (10 MG TOTAL) BY MOUTH DAILY 90 tablet 0    gabapentin (NEURONTIN) 300 mg capsule Use 1 tablet 4 times a day  360 capsule 3    glipiZIDE (GLUCOTROL) 5 mg tablet 2 tablets twice a day 360 tablet 3    lisinopril-hydrochlorothiazide (PRINZIDE,ZESTORETIC) 10-12 5 MG per tablet Take 1 tablet by mouth daily        metFORMIN (GLUCOPHAGE-XR) 500 mg 24 hr tablet Take 2 tablets (1,000 mg total) by mouth 2 (two) times a day with meals 360 tablet 2    simvastatin (ZOCOR) 20 mg tablet Take 20 mg by mouth        traMADol (ULTRAM) 50 mg tablet Take 1 tablet by mouth 3 (three) times a day as needed      finasteride (PROSCAR) 5 mg tablet Take 5 mg by mouth         No current facility-administered medications for this visit  No Known Allergies    Objective   Vitals: Blood pressure 112/80, pulse 92, height 5' 7" (1 702 m), weight 106 kg (233 lb 3 2 oz)  Invasive Devices          No matching active lines, drains, or airways          Physical Exam   Constitutional: He is oriented to person, place, and time  He appears well-developed and well-nourished  HENT:   Head: Normocephalic and atraumatic  Eyes: Pupils are equal, round, and reactive to light  Conjunctivae and EOM are normal    Neck: Normal range of motion  Neck supple  No thyromegaly present  No bruits of the carotids  Cardiovascular: Normal rate, regular rhythm, normal heart sounds and intact distal pulses  No murmur heard  Pulmonary/Chest: Effort normal and breath sounds normal  He has no wheezes  Musculoskeletal: Normal range of motion  He exhibits no edema or deformity     No ulceration of the feet when examined with the shoes and socks removed  Lymphadenopathy:     He has no cervical adenopathy  Neurological: He is alert and oriented to person, place, and time  Skin: Skin is warm and dry  No rash noted  No erythema  Vitals reviewed  The history was obtained from the review of the chart and from the patient  Lab Results:    Most recent Alc is  Lab Results   Component Value Date    HGBA1C 9 4 (H) 10/16/2018             Lab Results   Component Value Date    HDL 24 (L) 10/16/2018    TRIG 754 (H) 10/16/2018    Total cholesterol was 179 and LDL cholesterol was 76  Urine microalbumin to creatinine ratio was less than 0 2 mg/dL      Lab Results   Component Value Date    TSH 1 73 10/16/2018     Future Appointments  Date Time Provider Merle Hunt   3/7/2019 8:20 AM Lilian Bauman MD [facility]

## 2018-12-19 DIAGNOSIS — N52.8 OTHER MALE ERECTILE DYSFUNCTION: Primary | ICD-10-CM

## 2018-12-19 RX ORDER — TADALAFIL 20 MG
TABLET ORAL
Qty: 10 TABLET | Refills: 2 | Status: SHIPPED | OUTPATIENT
Start: 2018-12-19

## 2019-01-03 DIAGNOSIS — E11.9 TYPE 2 DIABETES MELLITUS WITHOUT COMPLICATION, WITHOUT LONG-TERM CURRENT USE OF INSULIN (HCC): ICD-10-CM

## 2019-01-03 RX ORDER — DAPAGLIFLOZIN 10 MG/1
10 TABLET, FILM COATED ORAL DAILY
Qty: 90 TABLET | Refills: 0 | Status: SHIPPED | OUTPATIENT
Start: 2019-01-03 | End: 2019-04-08 | Stop reason: SDUPTHER

## 2019-04-08 DIAGNOSIS — E11.9 TYPE 2 DIABETES MELLITUS WITHOUT COMPLICATION, WITHOUT LONG-TERM CURRENT USE OF INSULIN (HCC): ICD-10-CM

## 2019-04-08 RX ORDER — DAPAGLIFLOZIN 10 MG/1
10 TABLET, FILM COATED ORAL DAILY
Qty: 90 TABLET | Refills: 0 | OUTPATIENT
Start: 2019-04-08

## 2019-04-20 DIAGNOSIS — E11.65 TYPE 2 DIABETES MELLITUS WITH HYPERGLYCEMIA, WITHOUT LONG-TERM CURRENT USE OF INSULIN (HCC): ICD-10-CM

## 2019-04-22 RX ORDER — METFORMIN HYDROCHLORIDE 500 MG/1
1000 TABLET, EXTENDED RELEASE ORAL 2 TIMES DAILY WITH MEALS
Qty: 360 TABLET | Refills: 2 | OUTPATIENT
Start: 2019-04-22

## 2019-04-27 LAB
EST. AVERAGE GLUCOSE BLD GHB EST-MCNC: 235 (CALC)
EST. AVERAGE GLUCOSE BLD GHB EST-SCNC: 13 (CALC)
HBA1C MFR BLD: 9.8 % OF TOTAL HGB

## 2019-06-09 DIAGNOSIS — E11.65 TYPE 2 DIABETES MELLITUS WITH HYPERGLYCEMIA, WITHOUT LONG-TERM CURRENT USE OF INSULIN (HCC): ICD-10-CM

## 2019-06-10 RX ORDER — METFORMIN HYDROCHLORIDE 500 MG/1
1000 TABLET, EXTENDED RELEASE ORAL 2 TIMES DAILY WITH MEALS
Qty: 360 TABLET | Refills: 2 | Status: SHIPPED | OUTPATIENT
Start: 2019-06-10 | End: 2020-02-21

## 2019-06-13 ENCOUNTER — OFFICE VISIT (OUTPATIENT)
Dept: ENDOCRINOLOGY | Facility: HOSPITAL | Age: 54
End: 2019-06-13
Payer: COMMERCIAL

## 2019-06-13 VITALS
WEIGHT: 230 LBS | DIASTOLIC BLOOD PRESSURE: 82 MMHG | SYSTOLIC BLOOD PRESSURE: 120 MMHG | BODY MASS INDEX: 36.1 KG/M2 | HEIGHT: 67 IN | HEART RATE: 94 BPM

## 2019-06-13 DIAGNOSIS — E11.65 TYPE 2 DIABETES MELLITUS WITH HYPERGLYCEMIA, WITHOUT LONG-TERM CURRENT USE OF INSULIN (HCC): Primary | ICD-10-CM

## 2019-06-13 DIAGNOSIS — I10 ESSENTIAL HYPERTENSION: ICD-10-CM

## 2019-06-13 DIAGNOSIS — E11.42 DIABETIC POLYNEUROPATHY ASSOCIATED WITH TYPE 2 DIABETES MELLITUS (HCC): ICD-10-CM

## 2019-06-13 DIAGNOSIS — E78.2 MIXED HYPERLIPIDEMIA: ICD-10-CM

## 2019-06-13 PROCEDURE — 99215 OFFICE O/P EST HI 40 MIN: CPT | Performed by: INTERNAL MEDICINE

## 2019-07-08 DIAGNOSIS — E11.9 TYPE 2 DIABETES MELLITUS WITHOUT COMPLICATION, WITHOUT LONG-TERM CURRENT USE OF INSULIN (HCC): ICD-10-CM

## 2019-07-09 RX ORDER — DAPAGLIFLOZIN 10 MG/1
TABLET, FILM COATED ORAL
Qty: 90 TABLET | Refills: 0 | Status: SHIPPED | OUTPATIENT
Start: 2019-07-09 | End: 2019-10-02 | Stop reason: SDUPTHER

## 2019-08-08 DIAGNOSIS — E11.42 DIABETIC POLYNEUROPATHY ASSOCIATED WITH TYPE 2 DIABETES MELLITUS (HCC): ICD-10-CM

## 2019-08-08 RX ORDER — GABAPENTIN 300 MG/1
CAPSULE ORAL
Qty: 360 CAPSULE | Refills: 3 | Status: SHIPPED | OUTPATIENT
Start: 2019-08-08 | End: 2019-11-10 | Stop reason: SDUPTHER

## 2019-10-02 DIAGNOSIS — E11.9 TYPE 2 DIABETES MELLITUS WITHOUT COMPLICATION, WITHOUT LONG-TERM CURRENT USE OF INSULIN (HCC): ICD-10-CM

## 2019-10-02 RX ORDER — DAPAGLIFLOZIN 10 MG/1
TABLET, FILM COATED ORAL
Qty: 90 TABLET | Refills: 0 | Status: SHIPPED | OUTPATIENT
Start: 2019-10-02 | End: 2020-01-06

## 2019-11-10 DIAGNOSIS — E11.42 DIABETIC POLYNEUROPATHY ASSOCIATED WITH TYPE 2 DIABETES MELLITUS (HCC): ICD-10-CM

## 2019-11-11 ENCOUNTER — TELEPHONE (OUTPATIENT)
Dept: ENDOCRINOLOGY | Facility: HOSPITAL | Age: 54
End: 2019-11-11

## 2019-11-11 RX ORDER — GABAPENTIN 300 MG/1
CAPSULE ORAL
Qty: 360 CAPSULE | Refills: 3 | Status: SHIPPED | OUTPATIENT
Start: 2019-11-11 | End: 2019-11-11

## 2019-11-11 NOTE — TELEPHONE ENCOUNTER
Spoke with Pharmacy, they note that on 11/1/19 Lyrica 100mg was filled and on 11/2/19 Lyrica 75mg was filled  They note they have not filled the gabapentin since August  The Lyrica has been ordered by Marquis Vasquez

## 2019-11-11 NOTE — TELEPHONE ENCOUNTER
Do we know who prescribed the Lyrica? If he is on that, I would hold off on refilling the Gabapentin

## 2019-11-11 NOTE — TELEPHONE ENCOUNTER
Received call from pharmacy, they wanted to make you aware they are also filling the generic for lyrica currently for the patient- a script for gabapentin was sent in today      Please advise

## 2019-12-05 LAB
CREAT ?TM UR-SCNC: 52 UMOL/L
EXT MICROALBUMIN URINE RANDOM: 0.2
HBA1C MFR BLD HPLC: 9.1 %
MICROALBUMIN/CREAT UR: 4 MG/G{CREAT}

## 2019-12-10 DIAGNOSIS — E11.65 TYPE 2 DIABETES MELLITUS WITH HYPERGLYCEMIA, WITHOUT LONG-TERM CURRENT USE OF INSULIN (HCC): ICD-10-CM

## 2019-12-10 RX ORDER — GLIPIZIDE 5 MG/1
TABLET ORAL
Qty: 360 TABLET | Refills: 3 | Status: SHIPPED | OUTPATIENT
Start: 2019-12-10 | End: 2020-10-21

## 2019-12-16 ENCOUNTER — OFFICE VISIT (OUTPATIENT)
Dept: ENDOCRINOLOGY | Facility: HOSPITAL | Age: 54
End: 2019-12-16
Payer: COMMERCIAL

## 2019-12-16 VITALS
HEIGHT: 67 IN | SYSTOLIC BLOOD PRESSURE: 134 MMHG | HEART RATE: 86 BPM | BODY MASS INDEX: 36.35 KG/M2 | DIASTOLIC BLOOD PRESSURE: 86 MMHG | WEIGHT: 231.6 LBS

## 2019-12-16 DIAGNOSIS — E78.2 MIXED HYPERLIPIDEMIA: ICD-10-CM

## 2019-12-16 DIAGNOSIS — E11.42 DIABETIC POLYNEUROPATHY ASSOCIATED WITH TYPE 2 DIABETES MELLITUS (HCC): ICD-10-CM

## 2019-12-16 DIAGNOSIS — I10 ESSENTIAL HYPERTENSION: ICD-10-CM

## 2019-12-16 DIAGNOSIS — E11.65 TYPE 2 DIABETES MELLITUS WITH HYPERGLYCEMIA, WITHOUT LONG-TERM CURRENT USE OF INSULIN (HCC): Primary | ICD-10-CM

## 2019-12-16 PROCEDURE — 99215 OFFICE O/P EST HI 40 MIN: CPT | Performed by: INTERNAL MEDICINE

## 2019-12-16 PROCEDURE — 3075F SYST BP GE 130 - 139MM HG: CPT | Performed by: INTERNAL MEDICINE

## 2019-12-16 PROCEDURE — 3079F DIAST BP 80-89 MM HG: CPT | Performed by: INTERNAL MEDICINE

## 2019-12-16 RX ORDER — PREGABALIN 100 MG/1
100 CAPSULE ORAL 2 TIMES DAILY
COMMUNITY
End: 2020-08-05 | Stop reason: ALTCHOICE

## 2019-12-16 NOTE — PATIENT INSTRUCTIONS
hgba1c is 9 1%  This is still too high  Let's try starting a once a week medicine ozempic 0 25 mg once a week for 2 weeks and then 0 5 mg once a week thereafter, we'll get you to diabetes education to learn how to do this  This can be done here or at Dr Shakira Katz office  Continue the same metformin, glipizide, and farxiga  Continue to check blood sugars up to once daily  You do have high triglycerides( a component of cholesterol)  Follow up in 6 months with blood work

## 2019-12-16 NOTE — PROGRESS NOTES
12/16/2019    Assessment/Plan      Diagnoses and all orders for this visit:    Type 2 diabetes mellitus with hyperglycemia, without long-term current use of insulin (HCC)  -     Semaglutide,0 25 or 0 5MG/DOS, (OZEMPIC, 0 25 OR 0 5 MG/DOSE,) 2 MG/1 5ML SOPN; Inject 0 25 mg once a week for 2 weeks and then 0 5 mg once a week thereafter   -     Ambulatory referral to Diabetic Education; Future  -     HEMOGLOBIN A1C W/ EAG ESTIMATION Lab Collect; Future  -     Comprehensive metabolic panel Lab Collect; Future  -     TSH, 3rd generation Lab Collect; Future    Diabetic polyneuropathy associated with type 2 diabetes mellitus (HCC)  -     Semaglutide,0 25 or 0 5MG/DOS, (OZEMPIC, 0 25 OR 0 5 MG/DOSE,) 2 MG/1 5ML SOPN; Inject 0 25 mg once a week for 2 weeks and then 0 5 mg once a week thereafter   -     Ambulatory referral to Diabetic Education; Future  -     HEMOGLOBIN A1C W/ EAG ESTIMATION Lab Collect; Future  -     Comprehensive metabolic panel Lab Collect; Future  -     TSH, 3rd generation Lab Collect; Future    Essential hypertension  -     HEMOGLOBIN A1C W/ EAG ESTIMATION Lab Collect; Future  -     Comprehensive metabolic panel Lab Collect; Future  -     TSH, 3rd generation Lab Collect; Future    Mixed hyperlipidemia  -     HEMOGLOBIN A1C W/ EAG ESTIMATION Lab Collect; Future  -     Comprehensive metabolic panel Lab Collect; Future  -     TSH, 3rd generation Lab Collect; Future    Other orders  -     pregabalin (LYRICA) 100 mg capsule; Take 100 mg by mouth 2 (two) times a day        Assessment/Plan:  1  Type 2 diabetes  Most recent hemoglobin A1c is still quite high at 9 1%  It is that was slightly improved  I would like to start him on a GLP 1 inhibitor  My preference would be Ozempic  He would start this drug 0 25 mg once a week for 2 weeks and then 0 5 mg once a week thereafter  This may help them to lose weight in addition to bring his blood sugars down which I think he could benefit from    The biggest side effect is nausea and he is 1 of this  He will continue the same metformin, glipizide, and [place]  I will try to set him up for diabetic education to help him learn how to inject this medication  He can also lung this as his primary care physician's office  He will continue to check his blood sugars at least once a day  He says he does have an eye doctor visit scheduled as he is overdue for that screening  2  Diabetic neuropathy  He has neuropathic symptoms unchanged  Diabetic foot exam was performed in the office today  3  Hypertension  Blood pressure is under fair control on his current dose of lisinopril/HCTZ  4  Hyperlipidemia  Most recent lipids continue to show markedly elevated triglyceride which is likely due to his markedly elevated blood sugars  Hopefully this will improve with improvement in blood sugars  For now, he will continue the same simvastatin  I have asked him to follow up in 3 months with preceding hemoglobin A1c, CMP, and TSH  CC: Diabetes type 2, lipid, blood pressure follow-up    History of Present Illness     HPI: Dallas Cruz  is a 53y o  year old male with type 2 diabetes with neuropathy for 3 years, hypertension, hyperlipidemia here for follow-up  He is on oral agents at home and takes metformin  mg 2 tablets twice a day, Farxiga 10 mg daily, and glipizide 5 mg 2 tablets twice a day  He admits to polyuria, polydipsia, polyphagia, nocturia 2-3 times a night, and blurry vision at times  He has numbness and tingling of his feet unchanged  He also has numbness and tingling of his hands which is worsening  He denies chest pain or shortness of breath  He denies nephropathy, retinopathy, heart attack, stroke and claudication but does admit to neuropathy  Tries to be better with the diet  Not much exercise  Hypoglycemic episodes: No never  The patient's last eye exam was 2-3 years ago    The patient's last foot exam was in July 2018 at endocrine office visit  Last A1C was     Blood Sugar/Glucometer/Pump/CGM review: checks blood sugars infrequently  He started apple cider vinegar last week  Reports blood sugars in the high 100s or low 200s in am     He has hypertension and takes lisinopril/HCTZ 10/12 5 mg daily  He denies headache or stroke-like symptoms  He has hyperlipidemia and takes simvastatin 20 mg daily  He denies chest pain or shortness of breath  Review of Systems   Constitutional: Negative for fatigue and unexpected weight change  HENT: Negative for trouble swallowing  Eyes: Positive for visual disturbance  Some blurry vision at times  Wears glasses  Respiratory: Negative for chest tightness and shortness of breath  Cardiovascular: Negative for chest pain and leg swelling  Slight edema noted after taking socks off  Gastrointestinal: Negative for abdominal pain, constipation, diarrhea and nausea  Had infection in colon treated several months ago  Endocrine: Positive for polydipsia, polyphagia and polyuria  Nocturia 2-3 times a night  Skin: Negative for wound  Neurological: Positive for numbness  Negative for dizziness, weakness, light-headedness and headaches  Has numbness and tingling of the feet unchanged, the hands are worse  Psychiatric/Behavioral: Positive for sleep disturbance  Sleeping is notable for waking up frequently  Historical Information   Past Medical History:   Diagnosis Date    BPH (benign prostatic hyperplasia)     Lumbar herniated disc     Osteoarthritis      No past surgical history on file    Social History   Social History     Substance and Sexual Activity   Alcohol Use Yes    Comment: occasional     Social History     Substance and Sexual Activity   Drug Use No     Social History     Tobacco Use   Smoking Status Current Every Day Smoker    Types: Cigarettes   Smokeless Tobacco Never Used     Family History:   Family History   Problem Relation Age of Onset    No Known Problems Mother     Diabetes unspecified Father     Heart disease Father         post CABG    No Known Problems Brother        Meds/Allergies   Current Outpatient Medications   Medication Sig Dispense Refill    CIALIS 20 MG tablet TAKE 1 TABLET AS NEEDED 10 tablet 2    DULoxetine (CYMBALTA) 20 mg capsule Take 20 mg by mouth daily        FARXIGA 10 MG TABS TAKE 1 TABLET BY MOUTH EVERY DAY 90 tablet 0    finasteride (PROSCAR) 5 mg tablet Take 5 mg by mouth        glipiZIDE (GLUCOTROL) 5 mg tablet TAKE 2 TABLETS BY MOUTH TWICE A  tablet 3    lisinopril-hydrochlorothiazide (PRINZIDE,ZESTORETIC) 10-12 5 MG per tablet Take 1 tablet by mouth daily        metFORMIN (GLUCOPHAGE-XR) 500 mg 24 hr tablet TAKE 2 TABLETS (1,000 MG TOTAL) BY MOUTH 2 (TWO) TIMES A DAY WITH MEALS 360 tablet 2    pregabalin (LYRICA) 100 mg capsule Take 100 mg by mouth 2 (two) times a day      simvastatin (ZOCOR) 20 mg tablet Take 20 mg by mouth        traMADol (ULTRAM) 50 mg tablet Take 1 tablet by mouth 3 (three) times a day as needed      Semaglutide,0 25 or 0 5MG/DOS, (OZEMPIC, 0 25 OR 0 5 MG/DOSE,) 2 MG/1 5ML SOPN Inject 0 25 mg once a week for 2 weeks and then 0 5 mg once a week thereafter  2 pen 6     No current facility-administered medications for this visit  No Known Allergies    Objective   Vitals: Blood pressure 134/86, pulse 86, height 5' 7" (1 702 m), weight 105 kg (231 lb 9 6 oz)  Invasive Devices     None                 Physical Exam   Constitutional: He is oriented to person, place, and time  He appears well-developed and well-nourished  HENT:   Head: Normocephalic and atraumatic  Eyes: Pupils are equal, round, and reactive to light  Conjunctivae and EOM are normal    Neck: Normal range of motion  Neck supple  No thyromegaly present  Cardiovascular: Normal rate, regular rhythm and normal heart sounds  Pulses are weak pulses  No murmur heard    Pulses:       Dorsalis pedis pulses are 1+ on the right side, and 1+ on the left side  Posterior tibial pulses are 1+ on the right side, and 1+ on the left side  1+ dorsalis pedis and posterior tibialis pulses bilaterally  Pulmonary/Chest: Effort normal and breath sounds normal  He has no wheezes  Abdominal: Soft  There is no tenderness  Musculoskeletal: Normal range of motion  He exhibits no edema or deformity  No ulcerations of the feet  Feet:   Right Foot:   Skin Integrity: Negative for ulcer, skin breakdown, erythema, warmth, callus or dry skin  Left Foot:   Skin Integrity: Negative for ulcer, skin breakdown, erythema, warmth, callus or dry skin  Lymphadenopathy:     He has no cervical adenopathy  Neurological: He is alert and oriented to person, place, and time  He has normal reflexes  Vibration sensation absent to the bilateral 1st toe DIP joint and right 1st MP joint and present though markedly diminished to the left 1st MP joint and right medial malleolus  Microfilament sensation absent bilaterally  Skin: Skin is warm and dry  No rash noted  No erythema  Vitals reviewed  Patient's shoes and socks removed  Right Foot/Ankle   Right Foot Inspection  Skin Exam: skin normal and skin intact no dry skin, no warmth, no callus, no erythema, no maceration, no abnormal color, no pre-ulcer, no ulcer and no callus                          Toe Exam: ROM and strength within normal limits  Sensory   Vibration: diminished    Monofilament testing: absent  Vascular  Capillary refills: < 3 seconds  The right DP pulse is 1+  The right PT pulse is 1+       Left Foot/Ankle  Left Foot Inspection  Skin Exam: skin normal and skin intactno dry skin, no warmth, no erythema, no maceration, normal color, no pre-ulcer, no ulcer and no callus                         Toe Exam: ROM and strength within normal limits                   Sensory   Vibration: diminished    Monofilament: absent  Vascular  Capillary refills: < 3 seconds  The left DP pulse is 1+  The left PT pulse is 1+  Assign Risk Category:  No deformity present; Loss of protective sensation; Weak pulses       Risk: 2        The history was obtained from the review of the chart and from the patient  Lab Results:    Blood work done on 12/05/2019 showed hemoglobin A1c of 9 1%  CMP showed a glucose of 192 fasting, BUN 27, creatinine 1 05, GFR 80, but was otherwise normal   BUN/creatinine ratio was 26  Total cholesterol 163, triglyceride 540, HDL 27, LDL could not be calculated  Urine microalbumin to creatinine ratio is 4       Future Appointments   Date Time Provider Merle Hunt   6/16/2020  2:40 PM Jeni Roger MD [facility]

## 2020-01-06 DIAGNOSIS — E11.9 TYPE 2 DIABETES MELLITUS WITHOUT COMPLICATION, WITHOUT LONG-TERM CURRENT USE OF INSULIN (HCC): ICD-10-CM

## 2020-01-06 RX ORDER — DAPAGLIFLOZIN 10 MG/1
TABLET, FILM COATED ORAL
Qty: 90 TABLET | Refills: 0 | Status: SHIPPED | OUTPATIENT
Start: 2020-01-06 | End: 2020-04-09 | Stop reason: SDUPTHER

## 2020-01-14 ENCOUNTER — TELEPHONE (OUTPATIENT)
Dept: ENDOCRINOLOGY | Facility: HOSPITAL | Age: 55
End: 2020-01-14

## 2020-01-14 NOTE — TELEPHONE ENCOUNTER
Received call from insurance company  The patient is interested in switching from [address] [place] [address] to [address] [place] [address] as it would be a savings in cost  Can this be changed?

## 2020-01-20 ENCOUNTER — TELEPHONE (OUTPATIENT)
Dept: ENDOCRINOLOGY | Facility: HOSPITAL | Age: 55
End: 2020-01-20

## 2020-01-20 DIAGNOSIS — E11.65 TYPE 2 DIABETES MELLITUS WITH HYPERGLYCEMIA, WITHOUT LONG-TERM CURRENT USE OF INSULIN (HCC): Primary | ICD-10-CM

## 2020-01-20 DIAGNOSIS — E11.42 DIABETIC POLYNEUROPATHY ASSOCIATED WITH TYPE 2 DIABETES MELLITUS (HCC): ICD-10-CM

## 2020-01-20 NOTE — TELEPHONE ENCOUNTER
Patient called answering service  "he would like to change his med to victoza 90 day supply instead   If the doctor can submit rx for it"

## 2020-02-21 DIAGNOSIS — E11.65 TYPE 2 DIABETES MELLITUS WITH HYPERGLYCEMIA, WITHOUT LONG-TERM CURRENT USE OF INSULIN (HCC): ICD-10-CM

## 2020-02-21 RX ORDER — METFORMIN HYDROCHLORIDE 500 MG/1
1000 TABLET, EXTENDED RELEASE ORAL 2 TIMES DAILY WITH MEALS
Qty: 360 TABLET | Refills: 2 | Status: SHIPPED | OUTPATIENT
Start: 2020-02-21 | End: 2020-08-05 | Stop reason: SDUPTHER

## 2020-04-09 DIAGNOSIS — E11.9 TYPE 2 DIABETES MELLITUS WITHOUT COMPLICATION, WITHOUT LONG-TERM CURRENT USE OF INSULIN (HCC): Primary | ICD-10-CM

## 2020-07-27 LAB — HBA1C MFR BLD HPLC: 9.7 %

## 2020-08-05 ENCOUNTER — OFFICE VISIT (OUTPATIENT)
Dept: ENDOCRINOLOGY | Facility: HOSPITAL | Age: 55
End: 2020-08-05
Payer: COMMERCIAL

## 2020-08-05 VITALS
DIASTOLIC BLOOD PRESSURE: 80 MMHG | HEART RATE: 86 BPM | TEMPERATURE: 97.6 F | BODY MASS INDEX: 36.1 KG/M2 | WEIGHT: 230 LBS | SYSTOLIC BLOOD PRESSURE: 124 MMHG | HEIGHT: 67 IN

## 2020-08-05 DIAGNOSIS — E11.42 DIABETIC POLYNEUROPATHY ASSOCIATED WITH TYPE 2 DIABETES MELLITUS (HCC): Primary | ICD-10-CM

## 2020-08-05 DIAGNOSIS — I10 ESSENTIAL HYPERTENSION: ICD-10-CM

## 2020-08-05 DIAGNOSIS — E78.2 MIXED HYPERLIPIDEMIA: ICD-10-CM

## 2020-08-05 DIAGNOSIS — E11.65 TYPE 2 DIABETES MELLITUS WITH HYPERGLYCEMIA, WITHOUT LONG-TERM CURRENT USE OF INSULIN (HCC): ICD-10-CM

## 2020-08-05 PROCEDURE — 99214 OFFICE O/P EST MOD 30 MIN: CPT | Performed by: PHYSICIAN ASSISTANT

## 2020-08-05 RX ORDER — GABAPENTIN 400 MG/1
400 CAPSULE ORAL 4 TIMES DAILY
COMMUNITY
Start: 2020-07-13 | End: 2022-04-25 | Stop reason: SDUPTHER

## 2020-08-05 RX ORDER — INSULIN DETEMIR 100 [IU]/ML
10 INJECTION, SOLUTION SUBCUTANEOUS
Qty: 5 PEN | Refills: 0 | Status: SHIPPED | OUTPATIENT
Start: 2020-08-05 | End: 2020-10-08 | Stop reason: SDUPTHER

## 2020-08-05 RX ORDER — METFORMIN HYDROCHLORIDE 500 MG/1
1000 TABLET, EXTENDED RELEASE ORAL 2 TIMES DAILY WITH MEALS
Qty: 360 TABLET | Refills: 2 | Status: SHIPPED | OUTPATIENT
Start: 2020-08-05 | End: 2021-03-23 | Stop reason: SDUPTHER

## 2020-08-05 NOTE — PATIENT INSTRUCTIONS
S the importance of consistency with diet and exercise  For management of blood sugar will start on Levemir 10 units at night  Due to cost of medication will discontinue [place]  Continue with metformin  mg 2 tablets twice a day and glipizide 5 mg 2 tablets twice a day  Continue to follow up with Podiatry for diabetic ulcer  Schedule diabetic eye exam at earliest convenience  Continue with simvastatin for hyperlipidemia  Complete blood sugar log for 2 weeks and send into the office  Will make adjustments to medications pending results  Get lab work completed prior to 6 month follow-up

## 2020-08-05 NOTE — PROGRESS NOTES
8/5/2020    Assessment/Plan      Diagnoses and all orders for this visit:    Diabetic polyneuropathy associated with type 2 diabetes mellitus ([facility] )  -     Hemoglobin A1C; Future  -     Comprehensive metabolic panel; Future  -     Microalbumin / creatinine urine ratio; Future  -     Lipid Panel with Direct LDL reflex Lab Collect; Future  -     insulin detemir (Levemir FlexTouch) 100 Units/mL injection pen; Inject 10 Units under the skin daily at bedtime    Essential hypertension    Mixed hyperlipidemia    Type 2 diabetes mellitus with hyperglycemia, without long-term current use of insulin (HCC)  -     metFORMIN (GLUCOPHAGE-XR) 500 mg 24 hr tablet; Take 2 tablets (1,000 mg total) by mouth 2 (two) times a day with meals    Other orders  -     gabapentin (NEURONTIN) 400 mg capsule; Take 400 mg by mouth 4 (four) times a day        Assessment/Plan:    1  Type 2 diabetes  Increase in patient's A1c from 9 1% to 9 7%  Discussed the importance of consistency with diet and exercise to better control his blood sugar  Did offer consultation with diabetic educator at this time, but patient was to see how the insulin will work before he makes that step  Due to cost of medication, will discontinue Farxiga at this time  Start on Levemir 10 units at night  Recommend checking blood sugar twice a day alternating times every day for the next 2 weeks to get a good idea of where his blood sugar is throughout the day  Discussed hypoglycemic symptoms with him being on a sulfonylurea and insulin  2  Diabetic neuropathy  Unchanged  Discussed the importance of taking care of his hands and feet to reduce the risk of recurrent ulcers  3  Hypertension  Blood pressure within normal range  Continue with lisinopril / HCTZ  4  Hyperlipidemia  Total cholesterol was within normal range, triglycerides were elevated and LDL could not be calculated    Discussed that if we can bring down his blood sugar, this will also help with his triglycerides  5  Diabetic foot ulcer  Continue following up with Podiatry  CC:   Type 2 diabetes, hyperlipidemia, hypertension  History of Present Illness     HPI: Jake Murillo  is a 63y o  year old male with type 2 diabetes for 3 years  He is on oral agents at home and takes   Metformin  mg 2 tablets twice a day, Farxiga 10 mg daily, and glipizide 5 mg 2 tablets twice a day  He denies nephropathy, retinopathy, heart attack, stroke and claudication but does admit to neuropathy, recurrent ulcers, polydipsia, polyphagia, polyuria  At his last office visit he was recommended to start on Ozempic  Because the medication was 100 dollars, and was switched to Victoza  Patient states that this was also expensive, and has not been taking any GLP-1 antagonist   Does have poor dietary choices when it comes to his diabetes, such as eating carb heavy foods  Is in the process of making dietary changes  Is currently following up with Podiatry for diabetic foot ulcer  Does occasionally get ulcers on his fingers  Currently has a healing ulcer on the 4th digit of his left hand  Does not check his blood glucose regularly  Is interested in starting insulin at this time  Also has concerns with his [address] being too expensive  Hypoglycemic episodes: No      The patient's last eye exam was approximately 3 years ago  The patient's last foot exam was in  December 2019 in endocrine office       Blood Sugar/Glucometer/Pump/CGM review:   None  He has hypertension and takes lisinopril / HCTZ 10/12 5 mg daily  He has hyperlipidemia and takes simvastatin 20 mg daily  Review of Systems   Constitutional: Negative for activity change, appetite change, fatigue and unexpected weight change  HENT: Negative for trouble swallowing  Eyes: Positive for visual disturbance ( blurry vision, wears glasses  )  Respiratory: Negative for chest tightness and shortness of breath      Cardiovascular: Negative for chest pain, palpitations and leg swelling  Gastrointestinal: Negative for abdominal pain, diarrhea, nausea and vomiting  Endocrine: Positive for polydipsia, polyphagia and polyuria  Negative for cold intolerance and heat intolerance  Genitourinary: Negative for frequency  Skin: Positive for wound (  Active also on left foot, healing ulcer on left hand  )  Negative for rash  Neurological: Negative for dizziness, weakness, light-headedness, numbness and headaches  Psychiatric/Behavioral: Negative for dysphoric mood and sleep disturbance  The patient is not nervous/anxious  Historical Information   Past Medical History:   Diagnosis Date    BPH (benign prostatic hyperplasia)     Lumbar herniated disc     Osteoarthritis      No past surgical history on file    Social History   Social History     Substance and Sexual Activity   Alcohol Use Yes    Comment: occasional     Social History     Substance and Sexual Activity   Drug Use No     Social History     Tobacco Use   Smoking Status Current Every Day Smoker    Types: Cigarettes   Smokeless Tobacco Never Used     Family History:   Family History   Problem Relation Age of Onset    No Known Problems Mother     Diabetes unspecified Father     Heart disease Father         post CABG    No Known Problems Brother        Meds/Allergies   Current Outpatient Medications   Medication Sig Dispense Refill    CIALIS 20 MG tablet TAKE 1 TABLET AS NEEDED 10 tablet 2    Dapagliflozin Propanediol (Farxiga) 10 MG TABS Take 1 tablet (10 mg total) by mouth daily 90 tablet 1    DULoxetine (CYMBALTA) 20 mg capsule Take 20 mg by mouth daily        finasteride (PROSCAR) 5 mg tablet Take 5 mg by mouth        gabapentin (NEURONTIN) 400 mg capsule Take 400 mg by mouth 4 (four) times a day      glipiZIDE (GLUCOTROL) 5 mg tablet TAKE 2 TABLETS BY MOUTH TWICE A  tablet 3    Insulin Pen Needle 31G X 6 MM MISC Use once daily to inject Victoza 100 each 3  liraglutide (VICTOZA) injection Inject 1 8 mg daily 9 pen 3    lisinopril-hydrochlorothiazide (PRINZIDE,ZESTORETIC) 10-12 5 MG per tablet Take 1 tablet by mouth daily        metFORMIN (GLUCOPHAGE-XR) 500 mg 24 hr tablet Take 2 tablets (1,000 mg total) by mouth 2 (two) times a day with meals 360 tablet 2    simvastatin (ZOCOR) 20 mg tablet Take 20 mg by mouth        traMADol (ULTRAM) 50 mg tablet Take 1 tablet by mouth 3 (three) times a day as needed      insulin detemir (Levemir FlexTouch) 100 Units/mL injection pen Inject 10 Units under the skin daily at bedtime 5 pen 0     No current facility-administered medications for this visit  No Known Allergies    Objective   Vitals: Blood pressure 124/80, pulse 86, temperature 97 6 °F (36 4 °C), height 5' 7", weight 104 kg (230 lb)  Invasive Devices     None                 Physical Exam   Constitutional: He is oriented to person, place, and time  No distress  HENT:   Head: Normocephalic and atraumatic  Eyes: Pupils are equal, round, and reactive to light  No scleral icterus  Neck: No thyromegaly present  Cardiovascular: Normal rate and regular rhythm  No murmur heard  Pulmonary/Chest: Effort normal and breath sounds normal  No respiratory distress  He has no wheezes  Abdominal: Normal appearance  Musculoskeletal:      Right lower leg: No edema  Left lower leg: No edema  Comments:  Currently wearing a rocker boot on left foot  Lymphadenopathy:     He has no cervical adenopathy  Neurological: He is alert and oriented to person, place, and time  No sensory deficit  Skin: Skin is warm and dry  Lesion (  Healing ulcer of 4th digit on left hand no tenderness or surrounding erythema ) noted  Psychiatric: His behavior is normal  Mood and thought content normal    Nursing note and vitals reviewed  The history was obtained from the review of the chart and from the patient      Lab Results:    Most recent Alc is  Lab Results Component Value Date    HGBA1C 9 7 07/27/2020           No components found for: HA1C  No components found for: GLU    No results found for: CREATININE, BUN, NA, K, CL, CO2  No results found for: EGFR  No components found for: [facility]    Lab Results   Component Value Date    HDL 24 (L) 10/16/2018    TRIG 754 (H) 10/16/2018       No results found for: ALT, AST, GGT, ALKPHOS, BILITOT    Lab Results   Component Value Date    TSH 1 73 10/16/2018       Recent Results (from the past 12021 hour(s))   Hemoglobin A1C    Collection Time: 12/05/19 12:00 AM   Result Value Ref Range    Hemoglobin A1C 9 1    Microalbumin / creatinine urine ratio    Collection Time: 12/05/19 12:00 AM   Result Value Ref Range    EXT Creatinine Urine 52     Microalbum  ,U,Random 0 2     EXTERNAL Microalb/Creat Ratio 4    Hemoglobin A1C    Collection Time: 07/27/20 12:00 AM   Result Value Ref Range    Hemoglobin A1C 9 7          Future Appointments   Date Time Provider Merle Felipei   9/23/2020  3:00 PM Margie Dowd MD [facility]       Portions of the record may have been created with voice recognition software  Occasional wrong word or "sound a like" substitutions may have occurred due to the inherent limitations of voice recognition software  Read the chart carefully and recognize, using context, where substitutions have occurred

## 2020-08-06 DIAGNOSIS — E11.42 DIABETIC POLYNEUROPATHY ASSOCIATED WITH TYPE 2 DIABETES MELLITUS (HCC): ICD-10-CM

## 2020-08-06 DIAGNOSIS — E11.65 TYPE 2 DIABETES MELLITUS WITH HYPERGLYCEMIA, WITHOUT LONG-TERM CURRENT USE OF INSULIN (HCC): ICD-10-CM

## 2020-08-17 ENCOUNTER — TELEPHONE (OUTPATIENT)
Dept: ENDOCRINOLOGY | Facility: HOSPITAL | Age: 55
End: 2020-08-17

## 2020-08-17 NOTE — TELEPHONE ENCOUNTER
Received call from patient  He will be sending in his blood sugars this week  He notes in the past 2 weeks his numbers are ranging from [phone]  His wound care doctor wants his numbers lower and asked him to increase his Levemir, about 3 units every other day  Patient is currently taking Levemir at 16 units

## 2020-08-24 NOTE — TELEPHONE ENCOUNTER
He said he takes it every night before bed  When he wrote it on the side would be when his dose was changed  He said he has been eating healthier foods but is not great with his portion sizes  He is also questioning going off the [place] because it's expensive and his numbers are still high

## 2020-08-24 NOTE — TELEPHONE ENCOUNTER
The units on the side of the chart it is that how many units he is taking at night? And if there are no units is he taking any at all?

## 2020-08-25 NOTE — TELEPHONE ENCOUNTER
Pt aware  He said he is not interested in seeing Nael Arabella because he has had a DM class before  He knows what to do but just doesn't do it  He has not stopped the [place] yet, but will starting today

## 2020-08-25 NOTE — TELEPHONE ENCOUNTER
We did discuss at last office visit he can stop the [place]  He can bump up his insulin to 24 units at night  Has he met with Bibiana France in the past and if not would he be interested in talking to her about diet and portion sizes?

## 2020-09-03 LAB
LEFT EYE DIABETIC RETINOPATHY: NORMAL
RIGHT EYE DIABETIC RETINOPATHY: NORMAL
SEVERITY (EYE EXAM): NORMAL

## 2020-09-21 ENCOUNTER — TELEPHONE (OUTPATIENT)
Dept: ENDOCRINOLOGY | Facility: HOSPITAL | Age: 55
End: 2020-09-21

## 2020-09-21 NOTE — TELEPHONE ENCOUNTER
Received voicemail from patient  He states his blood sugars are high and he would like to increase his Levemir to 30 units daily  Left message for call back to find out what his blood sugars have been

## 2020-09-29 DIAGNOSIS — E11.9 TYPE 2 DIABETES MELLITUS WITHOUT COMPLICATION, WITHOUT LONG-TERM CURRENT USE OF INSULIN (HCC): ICD-10-CM

## 2020-09-29 RX ORDER — DAPAGLIFLOZIN 10 MG/1
TABLET, FILM COATED ORAL
Qty: 90 TABLET | Refills: 1 | Status: SHIPPED | OUTPATIENT
Start: 2020-09-29 | End: 2021-02-10 | Stop reason: SDUPTHER

## 2020-10-07 ENCOUNTER — TELEPHONE (OUTPATIENT)
Dept: ENDOCRINOLOGY | Facility: HOSPITAL | Age: 55
End: 2020-10-07

## 2020-10-08 DIAGNOSIS — E11.42 DIABETIC POLYNEUROPATHY ASSOCIATED WITH TYPE 2 DIABETES MELLITUS (HCC): ICD-10-CM

## 2020-10-09 RX ORDER — INSULIN DETEMIR 100 [IU]/ML
35 INJECTION, SOLUTION SUBCUTANEOUS DAILY
Qty: 5 PEN | Refills: 4 | Status: SHIPPED | OUTPATIENT
Start: 2020-10-09 | End: 2020-12-29 | Stop reason: SDUPTHER

## 2020-10-21 DIAGNOSIS — E11.65 TYPE 2 DIABETES MELLITUS WITH HYPERGLYCEMIA, WITHOUT LONG-TERM CURRENT USE OF INSULIN (HCC): ICD-10-CM

## 2020-10-21 RX ORDER — GLIPIZIDE 5 MG/1
TABLET ORAL
Qty: 360 TABLET | Refills: 3 | Status: SHIPPED | OUTPATIENT
Start: 2020-10-21 | End: 2021-10-03

## 2020-12-29 DIAGNOSIS — E11.65 TYPE 2 DIABETES MELLITUS WITH HYPERGLYCEMIA, WITHOUT LONG-TERM CURRENT USE OF INSULIN (HCC): ICD-10-CM

## 2020-12-29 DIAGNOSIS — E11.42 DIABETIC POLYNEUROPATHY ASSOCIATED WITH TYPE 2 DIABETES MELLITUS (HCC): ICD-10-CM

## 2020-12-29 RX ORDER — INSULIN DETEMIR 100 [IU]/ML
35 INJECTION, SOLUTION SUBCUTANEOUS DAILY
Qty: 5 PEN | Refills: 4 | Status: SHIPPED | OUTPATIENT
Start: 2020-12-29 | End: 2021-04-29 | Stop reason: SDUPTHER

## 2021-01-27 LAB
ALBUMIN SERPL-MCNC: 4.4 G/DL (ref 3.6–5.1)
ALBUMIN/CREAT UR: NORMAL MCG/MG CREAT
ALBUMIN/GLOB SERPL: 1.7 (CALC) (ref 1–2.5)
ALP SERPL-CCNC: 57 U/L (ref 35–144)
ALT SERPL-CCNC: 24 U/L (ref 9–46)
AST SERPL-CCNC: 21 U/L (ref 10–35)
BILIRUB SERPL-MCNC: 0.7 MG/DL (ref 0.2–1.2)
BUN SERPL-MCNC: 26 MG/DL (ref 7–25)
BUN/CREAT SERPL: 29 (CALC) (ref 6–22)
CALCIUM SERPL-MCNC: 9.6 MG/DL (ref 8.6–10.3)
CHLORIDE SERPL-SCNC: 98 MMOL/L (ref 98–110)
CHOLEST SERPL-MCNC: 161 MG/DL
CHOLEST/HDLC SERPL: 6 (CALC)
CO2 SERPL-SCNC: 28 MMOL/L (ref 20–32)
CREAT SERPL-MCNC: 0.9 MG/DL (ref 0.7–1.33)
CREAT UR-MCNC: 54 MG/DL (ref 20–320)
GLOBULIN SER CALC-MCNC: 2.6 G/DL (CALC) (ref 1.9–3.7)
GLUCOSE SERPL-MCNC: 175 MG/DL (ref 65–99)
HBA1C MFR BLD: 9.1 % OF TOTAL HGB
HDLC SERPL-MCNC: 27 MG/DL
LDLC SERPL DIRECT ASSAY-MCNC: 74 MG/DL
MICROALBUMIN UR-MCNC: <0.2 MG/DL
NONHDLC SERPL-MCNC: 134 MG/DL (CALC)
POTASSIUM SERPL-SCNC: 4.2 MMOL/L (ref 3.5–5.3)
PROT SERPL-MCNC: 7 G/DL (ref 6.1–8.1)
SL AMB EGFR AFRICAN AMERICAN: 110 ML/MIN/1.73M2
SL AMB EGFR NON AFRICAN AMERICAN: 95 ML/MIN/1.73M2
SODIUM SERPL-SCNC: 135 MMOL/L (ref 135–146)
TRIGL SERPL-MCNC: 535 MG/DL

## 2021-02-10 ENCOUNTER — OFFICE VISIT (OUTPATIENT)
Dept: ENDOCRINOLOGY | Facility: HOSPITAL | Age: 56
End: 2021-02-10
Payer: COMMERCIAL

## 2021-02-10 VITALS
DIASTOLIC BLOOD PRESSURE: 70 MMHG | WEIGHT: 240.2 LBS | HEART RATE: 89 BPM | SYSTOLIC BLOOD PRESSURE: 100 MMHG | BODY MASS INDEX: 37.7 KG/M2 | HEIGHT: 67 IN

## 2021-02-10 DIAGNOSIS — I10 ESSENTIAL HYPERTENSION: ICD-10-CM

## 2021-02-10 DIAGNOSIS — E11.9 TYPE 2 DIABETES MELLITUS WITHOUT COMPLICATION, WITHOUT LONG-TERM CURRENT USE OF INSULIN (HCC): ICD-10-CM

## 2021-02-10 DIAGNOSIS — E11.42 DIABETIC POLYNEUROPATHY ASSOCIATED WITH TYPE 2 DIABETES MELLITUS (HCC): ICD-10-CM

## 2021-02-10 DIAGNOSIS — E11.65 TYPE 2 DIABETES MELLITUS WITH HYPERGLYCEMIA, WITHOUT LONG-TERM CURRENT USE OF INSULIN (HCC): Primary | ICD-10-CM

## 2021-02-10 DIAGNOSIS — E78.2 MIXED HYPERLIPIDEMIA: ICD-10-CM

## 2021-02-10 PROCEDURE — 99214 OFFICE O/P EST MOD 30 MIN: CPT | Performed by: PHYSICIAN ASSISTANT

## 2021-02-10 RX ORDER — DULAGLUTIDE 0.75 MG/.5ML
0.75 INJECTION, SOLUTION SUBCUTANEOUS WEEKLY
Qty: 4 PEN | Refills: 5 | Status: SHIPPED | OUTPATIENT
Start: 2021-02-10 | End: 2022-04-25

## 2021-02-10 RX ORDER — DAPAGLIFLOZIN 10 MG/1
10 TABLET, FILM COATED ORAL DAILY
Qty: 90 TABLET | Refills: 1 | Status: SHIPPED | OUTPATIENT
Start: 2021-02-10 | End: 2021-07-14

## 2021-02-10 NOTE — PATIENT INSTRUCTIONS
Continue to monitor diet, maintain physical activity  Make sure to drink plenty of water throughout the day  Continue Levemir 35 units at night  Continue with metformin  mg 2 tablets twice a day and glipizide 5 mg 2 tablets twice a day  Start Trulicity 7 36 mg once a week  Will increase in 4 weeks  Recommend following up with podiatry for foot care  Continue with simvastatin for hyperlipidemia  Complete blood sugar log for 4 weeks and send into the office  Get lab work completed prior to 6 month follow-up

## 2021-02-10 NOTE — PROGRESS NOTES
Minoo Ponce  63 y o  male MRN: [id number]    Encounter: [id number]      Assessment/Plan     Assessment: This is a 63y o -year-old male with type 2 diabetes with neuropathy, hypertension, hyperlipidemia  Plan:  1  Type 2 diabetes: A1c has decreased down to 9 1, however this is still above goal   At this time will continue with metformin 500 mg 2 tablets twice a day, Farxiga 10 mg daily, glipizide 5 mg 2 tablets twice a day  Will also continue with Levemir 35 units daily  At this time since his insurance does appear to cover diabetes medications, will also start on Trulicity to see if this will help bring his blood sugar down  Will start on 0 75 mg daily, and increase after 1 month  Discussed the importance of continue to check blood sugars  Please send in blood sugar logs in 2 weeks  If blood sugars start to trend downward, will start backing off on the Levemir  2  Diabetic neuropathy:  Diabetic foot exam completed today  Does have some severe drying cracking feet, concerned of possible complications in the future since he has had a previous diabetic ulcer  Gave referral to Podiatry for further evaluation treatment  3  Hypertension:  Normotensive in the office today  Continue with current medications  4  Hyperlipidemia:  Cholesterol doing well  Will continue with current medication  CC:  Type 2 diabetes follow-up    History of Present Illness     HPI:  Minoo Ponce  is a 63y o  year old male with type 2 diabetes for 3 years  He is on oral agents at home and takes   Metformin  mg 2 tablets twice a day, Farxiga 10 mg daily, and glipizide 5 mg 2 tablets twice a day, levemir 35 units daily  He denies nephropathy, retinopathy, heart attack, stroke and claudication but does admit to neuropathy, recurrent ulcers, polydipsia, polyphagia, polyuria  Was prescribed a GLP 1 agonist in the past, but all options were too expensive    He does state that he got new insurance, and that they should cover any type of diabetes medication  Does have poor dietary choices when it comes to his diabetes, such as eating carb heavy foods  Is continuing to make changes in his dietary habits  Recent foot ulcer has healed, is no longer following up with wound care     Does not check his blood glucose regularly      Hypoglycemic episodes: No       The patient's last eye exam was approximately 3 years ago  The patient's last foot exam was in  December 2019 in endocrine office      Blood Sugar/Glucometer/Pump/CGM review:   Presented with blood sugar logs, however they were from October and November States now his blood sugar typically runs in the high 100s low 200s       He has hypertension and takes lisinopril / HCTZ 10/12 5 mg daily      He has hyperlipidemia and takes simvastatin 20 mg daily  Review of Systems   Constitutional: Negative for activity change, appetite change, fatigue and unexpected weight change  HENT: Negative for trouble swallowing  Eyes: Positive for visual disturbance ( blurry vision, wears glasses  )  Respiratory: Negative for chest tightness and shortness of breath  Cardiovascular: Negative for chest pain, palpitations and leg swelling  Gastrointestinal: Negative for abdominal pain, diarrhea, nausea and vomiting  Endocrine: Positive for polydipsia, polyphagia and polyuria  Negative for cold intolerance and heat intolerance  Genitourinary: Negative for frequency  Skin: Negative for rash and wound  Neurological: Positive for numbness  Negative for dizziness, weakness, light-headedness and headaches  Psychiatric/Behavioral: Negative for dysphoric mood and sleep disturbance  The patient is not nervous/anxious  Historical Information   Past Medical History:   Diagnosis Date    BPH (benign prostatic hyperplasia)     Lumbar herniated disc     Osteoarthritis      No past surgical history on file    Social History   Social History     Substance and Sexual Activity   Alcohol Use Yes    Comment: occasional     Social History     Substance and Sexual Activity   Drug Use No     Social History     Tobacco Use   Smoking Status Current Every Day Smoker    Types: Cigarettes   Smokeless Tobacco Never Used     Family History:   Family History   Problem Relation Age of Onset    No Known Problems Mother     Diabetes unspecified Father     Heart disease Father         post CABG    No Known Problems Brother        Meds/Allergies   Current Outpatient Medications   Medication Sig Dispense Refill    CIALIS 20 MG tablet TAKE 1 TABLET AS NEEDED 10 tablet 2    DULoxetine (CYMBALTA) 20 mg capsule Take 20 mg by mouth daily        Farxiga 10 MG TABS TAKE 1 TABLET BY MOUTH EVERY DAY 90 tablet 1    finasteride (PROSCAR) 5 mg tablet Take 5 mg by mouth        gabapentin (NEURONTIN) 400 mg capsule Take 400 mg by mouth 4 (four) times a day      glipiZIDE (GLUCOTROL) 5 mg tablet TAKE 2 TABLETS BY MOUTH TWICE A  tablet 3    insulin detemir (Levemir FlexTouch) 100 Units/mL injection pen Inject 35 Units under the skin daily 5 pen 4    Insulin Pen Needle 31G X 6 MM MISC Use twice daily 100 each 3    liraglutide (VICTOZA) injection Inject 1 8 mg daily 9 pen 3    lisinopril-hydrochlorothiazide (PRINZIDE,ZESTORETIC) 10-12 5 MG per tablet Take 1 tablet by mouth daily        metFORMIN (GLUCOPHAGE-XR) 500 mg 24 hr tablet Take 2 tablets (1,000 mg total) by mouth 2 (two) times a day with meals 360 tablet 2    simvastatin (ZOCOR) 20 mg tablet Take 20 mg by mouth        traMADol (ULTRAM) 50 mg tablet Take 1 tablet by mouth 3 (three) times a day as needed       No current facility-administered medications for this visit  No Known Allergies    Objective   Vitals: There were no vitals taken for this visit  Physical Exam  Vitals signs and nursing note reviewed  Constitutional:       General: He is not in acute distress  Appearance: Normal appearance  He is obese   He is not diaphoretic  HENT:      Head: Normocephalic and atraumatic  Eyes:      General: No scleral icterus  Extraocular Movements: Extraocular movements intact  Conjunctiva/sclera: Conjunctivae normal       Pupils: Pupils are equal, round, and reactive to light  Cardiovascular:      Rate and Rhythm: Normal rate and regular rhythm  Pulses: Pulses are weak  Dorsalis pedis pulses are 1+ on the right side and 1+ on the left side  Posterior tibial pulses are 1+ on the right side and 1+ on the left side  Heart sounds: No murmur  Pulmonary:      Effort: Pulmonary effort is normal  No respiratory distress  Breath sounds: Normal breath sounds  No wheezing  Musculoskeletal:      Right lower leg: Edema present  Left lower leg: Edema present  Feet:      Right foot:      Skin integrity: Callus and dry skin present  No ulcer, skin breakdown, erythema or warmth  Left foot:      Skin integrity: Callus and dry skin present  No ulcer, skin breakdown, erythema or warmth  Lymphadenopathy:      Cervical: No cervical adenopathy  Skin:     General: Skin is warm and dry  Neurological:      Mental Status: He is alert and oriented to person, place, and time  Mental status is at baseline  Sensory: No sensory deficit  Psychiatric:         Mood and Affect: Mood normal          Behavior: Behavior normal          Thought Content: Thought content normal      Patient's shoes and socks removed  Right Foot/Ankle   Right Foot Inspection  Skin Exam: skin normal, skin intact, dry skin, callus, abnormal color, pre-ulcer and callus no warmth, no erythema, no maceration and no ulcer                          Toe Exam: ROM and strength within normal limits and swellingno tenderness, erythema and  no right toe deformity  Sensory   Vibration: diminished  Proprioception: intact   Monofilament testing: diminished  Vascular  Capillary refills: < 3 seconds  The right DP pulse is 1+   The right PT pulse is 1+  Left Foot/Ankle  Left Foot Inspection  Skin Exam: skin normal, skin intact, dry skin, abnormal color and callusno warmth, no erythema, no maceration, no pre-ulcer and no ulcer                         Toe Exam: ROM and strength within normal limits and swellingno tenderness, no erythema and no left toe deformity                   Sensory   Vibration: diminished  Proprioception: intact  Monofilament: diminished  Vascular  Capillary refills: < 3 seconds  The left DP pulse is 1+  The left PT pulse is 1+  Assign Risk Category:  Deformity present; Loss of protective sensation; Weak pulses       Risk: 2        The history was obtained from the review of the chart, patient  Lab Results:   Lab Results   Component Value Date/Time    Hemoglobin A1C 9 1 (H) 01/26/2021 07:45 AM    Hemoglobin A1C 9 7 07/27/2020    BUN 26 (H) 01/26/2021 07:45 AM    Potassium 4 2 01/26/2021 07:45 AM    Chloride 98 01/26/2021 07:45 AM    CO2 28 01/26/2021 07:45 AM    Creatinine 0 90 01/26/2021 07:45 AM    AST 21 01/26/2021 07:45 AM    ALT 24 01/26/2021 07:45 AM    Albumin 4 4 01/26/2021 07:45 AM    Globulin 2 6 01/26/2021 07:45 AM    HDL 27 (L) 01/26/2021 07:45 AM    Triglycerides 535 (H) 01/26/2021 07:45 AM       Portions of the record may have been created with voice recognition software  Occasional wrong word or "sound a like" substitutions may have occurred due to the inherent limitations of voice recognition software  Read the chart carefully and recognize, using context, where substitutions have occurred

## 2021-02-11 ENCOUNTER — TELEPHONE (OUTPATIENT)
Dept: ADMINISTRATIVE | Facility: OTHER | Age: 56
End: 2021-02-11

## 2021-02-11 NOTE — TELEPHONE ENCOUNTER
Upon review of the In Basket request and the patient's chart, initial outreach has been made via fax, please see Contacts section for details       Thank you  Gustavo Stone MA   Fax number for Awilda Lazojose luisletitia is [phone]

## 2021-02-11 NOTE — TELEPHONE ENCOUNTER
Colonoscopy:    Upon review of the In Basket request and the patient's chart, initial outreach has been made via telephone call, please see Contacts section for details       Thank you  Sarah Bowers MA

## 2021-02-11 NOTE — TELEPHONE ENCOUNTER
Upon review of the In Basket request we were able to locate, review, and update the patient chart as requested for CRC: Colonoscopy  Any additional questions or concerns should be emailed to the Practice Liaisons via [email or web address] email, please do not reply via In Basket      Thank you  Alejandra Ashley MA

## 2021-02-11 NOTE — TELEPHONE ENCOUNTER
----- Message from [facility] sent at 2/10/2021  2:57 PM EST -----  Regarding: Colonoscopy  02/10/21 2:57 PM    Hello, our patient Carma Leyden  has had a Colonoscopy through [facility]  He is not sure what office or doctor did it, but he said it was about a year ago       Thank you,  [address] [place] [address] [facility] Jesus Wilburn

## 2021-02-11 NOTE — TELEPHONE ENCOUNTER
Upon review of the In Basket request we were able to locate, review, and update the patient chart as requested for Diabetic Eye Exam     Any additional questions or concerns should be emailed to the Practice Liaisons via [email or web address] email, please do not reply via In Basket      Thank you  Michael Diaz MA

## 2021-02-11 NOTE — TELEPHONE ENCOUNTER
----- Message from [address] [place] [address] sent at 2/10/2021  2:58 PM EST -----  Regarding: DM EYE EXAM  02/10/21 2:58 PM    Hello, our patient Garduno Citizen  has had a DM Eye Exam with Renetta Basurto in [place]  Their number is [phone]      Thank you,  [facility] [place]

## 2021-02-11 NOTE — LETTER
Diabetic Eye Exam Form    Date Requested: 21  Patient: Alexandro Figueroa  Patient : 1965   Referring Provider: Bryan Cisneros    Dilated Retinal Exam, Optomap-Iris Exam, or Fundus Photography Done         Yes (Kenaitze one above)         No     Date of Diabetic Eye Exam ______________________________  Left Eye      Exam did show retinopathy    Exam did not show retinopathy         Mild       Moderate       None       Proliferative       Severe     Right Eye     Exam did show retinopathy    Exam did not show retinopathy         Mild       Moderate       None       Proliferative       Severe     Comments __________________________________________________________    Practice Providing Exam ______________________________________________    Exam Performed By (print name) _______________________________________      Provider Signature ___________________________________________________      These reports are needed for  compliance    Please fax this completed form and a copy of the Diabetic Eye Exam report to our office located at [place] [address] as soon as possible to [phone] jeannette Wallace: Phone [phone]    We thank you for your assistance in treating our mutual patient

## 2021-03-19 ENCOUNTER — TELEPHONE (OUTPATIENT)
Dept: ENDOCRINOLOGY | Facility: HOSPITAL | Age: 56
End: 2021-03-19

## 2021-03-19 NOTE — TELEPHONE ENCOUNTER
He is on the max dose of his oral medications  At this time will increase his Levemir to 38 units  Recommend that he send in blood sugar logs in 2 weeks  We will only be able to increase his Levemir by so much before we start having episodes of low blood sugars, and if he is still having highs but with lows lows, may have to consider a mealtime insulin

## 2021-03-19 NOTE — TELEPHONE ENCOUNTER
Patient called  He said that Trulicity, Ozempic, and Victoza are not covered by his insurance  He said insulin is covered  He would like his Levemir or something increased to help him have better control  If he does not answer when we call him back he asked that we leave him a detailed message

## 2021-03-23 DIAGNOSIS — E11.65 TYPE 2 DIABETES MELLITUS WITH HYPERGLYCEMIA, WITHOUT LONG-TERM CURRENT USE OF INSULIN (HCC): ICD-10-CM

## 2021-03-24 RX ORDER — METFORMIN HYDROCHLORIDE 500 MG/1
1000 TABLET, EXTENDED RELEASE ORAL 2 TIMES DAILY WITH MEALS
Qty: 360 TABLET | Refills: 1 | Status: SHIPPED | OUTPATIENT
Start: 2021-03-24 | End: 2021-08-20 | Stop reason: SDUPTHER

## 2021-03-29 DIAGNOSIS — Z23 ENCOUNTER FOR IMMUNIZATION: ICD-10-CM

## 2021-04-29 DIAGNOSIS — E11.42 DIABETIC POLYNEUROPATHY ASSOCIATED WITH TYPE 2 DIABETES MELLITUS (HCC): ICD-10-CM

## 2021-04-29 RX ORDER — INSULIN DETEMIR 100 [IU]/ML
35 INJECTION, SOLUTION SUBCUTANEOUS DAILY
Qty: 15 ML | Refills: 5 | Status: SHIPPED | OUTPATIENT
Start: 2021-04-29 | End: 2021-12-28 | Stop reason: SDUPTHER

## 2021-08-20 DIAGNOSIS — E11.65 TYPE 2 DIABETES MELLITUS WITH HYPERGLYCEMIA, WITHOUT LONG-TERM CURRENT USE OF INSULIN (HCC): ICD-10-CM

## 2021-08-20 RX ORDER — METFORMIN HYDROCHLORIDE 500 MG/1
1000 TABLET, EXTENDED RELEASE ORAL 2 TIMES DAILY WITH MEALS
Qty: 360 TABLET | Refills: 1 | Status: SHIPPED | OUTPATIENT
Start: 2021-08-20 | End: 2021-12-27

## 2021-10-02 DIAGNOSIS — E11.65 TYPE 2 DIABETES MELLITUS WITH HYPERGLYCEMIA, WITHOUT LONG-TERM CURRENT USE OF INSULIN (HCC): ICD-10-CM

## 2021-10-03 RX ORDER — GLIPIZIDE 5 MG/1
TABLET ORAL
Qty: 360 TABLET | Refills: 3 | Status: SHIPPED | OUTPATIENT
Start: 2021-10-03 | End: 2022-06-16

## 2021-10-07 ENCOUNTER — OFFICE VISIT (OUTPATIENT)
Dept: ENDOCRINOLOGY | Facility: HOSPITAL | Age: 56
End: 2021-10-07
Payer: COMMERCIAL

## 2021-10-07 VITALS
DIASTOLIC BLOOD PRESSURE: 72 MMHG | BODY MASS INDEX: 37.54 KG/M2 | WEIGHT: 239.2 LBS | HEART RATE: 88 BPM | HEIGHT: 67 IN | SYSTOLIC BLOOD PRESSURE: 108 MMHG

## 2021-10-07 DIAGNOSIS — Z79.4 TYPE 2 DIABETES MELLITUS WITH HYPERGLYCEMIA, WITH LONG-TERM CURRENT USE OF INSULIN (HCC): ICD-10-CM

## 2021-10-07 DIAGNOSIS — I10 PRIMARY HYPERTENSION: ICD-10-CM

## 2021-10-07 DIAGNOSIS — E11.42 DIABETIC POLYNEUROPATHY ASSOCIATED WITH TYPE 2 DIABETES MELLITUS (HCC): Primary | ICD-10-CM

## 2021-10-07 DIAGNOSIS — E78.2 MIXED HYPERLIPIDEMIA: ICD-10-CM

## 2021-10-07 DIAGNOSIS — E11.65 TYPE 2 DIABETES MELLITUS WITH HYPERGLYCEMIA, WITH LONG-TERM CURRENT USE OF INSULIN (HCC): ICD-10-CM

## 2021-10-07 PROCEDURE — 99214 OFFICE O/P EST MOD 30 MIN: CPT | Performed by: PHYSICIAN ASSISTANT

## 2021-10-07 RX ORDER — OMEPRAZOLE 20 MG/1
20 CAPSULE, DELAYED RELEASE ORAL DAILY
COMMUNITY
End: 2022-04-25

## 2021-10-07 RX ORDER — HYDROCODONE BITARTRATE AND ACETAMINOPHEN 10; 325 MG/1; MG/1
TABLET ORAL
COMMUNITY
Start: 2021-10-01 | End: 2022-04-26 | Stop reason: SDUPTHER

## 2021-10-07 RX ORDER — IBUPROFEN 200 MG
TABLET ORAL EVERY 6 HOURS PRN
COMMUNITY

## 2021-10-07 RX ORDER — ALBUTEROL SULFATE 90 UG/1
AEROSOL, METERED RESPIRATORY (INHALATION)
COMMUNITY
Start: 2021-10-04

## 2021-10-07 RX ORDER — LORATADINE 10 MG/1
10 TABLET ORAL DAILY
COMMUNITY

## 2021-10-07 RX ORDER — PHENTERMINE HYDROCHLORIDE 37.5 MG/1
37.5 CAPSULE ORAL EVERY MORNING
COMMUNITY
End: 2022-04-25

## 2021-10-11 ENCOUNTER — TELEPHONE (OUTPATIENT)
Dept: ENDOCRINOLOGY | Facility: HOSPITAL | Age: 56
End: 2021-10-11

## 2021-10-18 DIAGNOSIS — E11.65 TYPE 2 DIABETES MELLITUS WITH HYPERGLYCEMIA, WITHOUT LONG-TERM CURRENT USE OF INSULIN (HCC): ICD-10-CM

## 2021-10-18 DIAGNOSIS — E11.42 DIABETIC POLYNEUROPATHY ASSOCIATED WITH TYPE 2 DIABETES MELLITUS (HCC): ICD-10-CM

## 2021-10-19 RX ORDER — PEN NEEDLE, DIABETIC 31 GX5/16"
NEEDLE, DISPOSABLE MISCELLANEOUS
Qty: 200 EACH | Refills: 1 | Status: SHIPPED | OUTPATIENT
Start: 2021-10-19 | End: 2022-04-04

## 2021-12-10 LAB
ALBUMIN SERPL-MCNC: 4.6 G/DL (ref 3.6–5.1)
ALBUMIN/GLOB SERPL: 1.8 (CALC) (ref 1–2.5)
ALP SERPL-CCNC: 56 U/L (ref 35–144)
ALT SERPL-CCNC: 28 U/L (ref 9–46)
AST SERPL-CCNC: 22 U/L (ref 10–35)
BILIRUB SERPL-MCNC: 0.6 MG/DL (ref 0.2–1.2)
BUN SERPL-MCNC: 23 MG/DL (ref 7–25)
BUN/CREAT SERPL: ABNORMAL (CALC) (ref 6–22)
CALCIUM SERPL-MCNC: 9.8 MG/DL (ref 8.6–10.3)
CHLORIDE SERPL-SCNC: 102 MMOL/L (ref 98–110)
CO2 SERPL-SCNC: 28 MMOL/L (ref 20–32)
CREAT SERPL-MCNC: 0.96 MG/DL (ref 0.7–1.33)
GLOBULIN SER CALC-MCNC: 2.5 G/DL (CALC) (ref 1.9–3.7)
GLUCOSE SERPL-MCNC: 177 MG/DL (ref 65–99)
HBA1C MFR BLD: 8.1 % OF TOTAL HGB
POTASSIUM SERPL-SCNC: 4.2 MMOL/L (ref 3.5–5.3)
PROT SERPL-MCNC: 7.1 G/DL (ref 6.1–8.1)
SL AMB EGFR AFRICAN AMERICAN: 101 ML/MIN/1.73M2
SL AMB EGFR NON AFRICAN AMERICAN: 87 ML/MIN/1.73M2
SODIUM SERPL-SCNC: 138 MMOL/L (ref 135–146)

## 2021-12-13 ENCOUNTER — TELEPHONE (OUTPATIENT)
Dept: PAIN MEDICINE | Facility: CLINIC | Age: 56
End: 2021-12-13

## 2021-12-13 ENCOUNTER — CONSULT (OUTPATIENT)
Dept: PAIN MEDICINE | Facility: CLINIC | Age: 56
End: 2021-12-13
Payer: COMMERCIAL

## 2021-12-13 VITALS
DIASTOLIC BLOOD PRESSURE: 74 MMHG | BODY MASS INDEX: 36.73 KG/M2 | HEART RATE: 83 BPM | TEMPERATURE: 97.3 F | WEIGHT: 234 LBS | SYSTOLIC BLOOD PRESSURE: 114 MMHG | HEIGHT: 67 IN

## 2021-12-13 DIAGNOSIS — Z79.891 LONG-TERM CURRENT USE OF OPIATE ANALGESIC: ICD-10-CM

## 2021-12-13 DIAGNOSIS — G89.4 CHRONIC PAIN SYNDROME: Primary | ICD-10-CM

## 2021-12-13 DIAGNOSIS — F11.29 OPIOID DEPENDENCE WITH OPIOID-INDUCED DISORDER (HCC): ICD-10-CM

## 2021-12-13 DIAGNOSIS — G62.9 POLYNEUROPATHY: ICD-10-CM

## 2021-12-13 PROBLEM — M51.9 INTERVERTEBRAL DISC DISORDER: Status: ACTIVE | Noted: 2021-12-13

## 2021-12-13 PROBLEM — L97.509 ULCER OF FOOT DUE TO TYPE 2 DIABETES MELLITUS (HCC): Status: ACTIVE | Noted: 2021-12-13

## 2021-12-13 PROBLEM — N40.0 BENIGN PROSTATIC HYPERPLASIA: Status: ACTIVE | Noted: 2021-12-13

## 2021-12-13 PROBLEM — L97.529 NON-PRESSURE CHRONIC ULCER OF OTHER PART OF LEFT FOOT WITH UNSPECIFIED SEVERITY (HCC): Status: ACTIVE | Noted: 2021-12-13

## 2021-12-13 PROBLEM — E78.5 DYSLIPIDEMIA: Status: ACTIVE | Noted: 2021-12-13

## 2021-12-13 PROBLEM — L73.2 HIDRADENITIS SUPPURATIVA: Status: ACTIVE | Noted: 2021-12-13

## 2021-12-13 PROBLEM — E11.65 HYPERGLYCEMIA DUE TO TYPE 2 DIABETES MELLITUS (HCC): Status: ACTIVE | Noted: 2021-12-13

## 2021-12-13 PROBLEM — E11.621 ULCER OF FOOT DUE TO TYPE 2 DIABETES MELLITUS (HCC): Status: ACTIVE | Noted: 2021-12-13

## 2021-12-13 PROBLEM — E66.01 MORBID OBESITY (HCC): Status: ACTIVE | Noted: 2021-12-13

## 2021-12-13 PROCEDURE — 99204 OFFICE O/P NEW MOD 45 MIN: CPT | Performed by: ANESTHESIOLOGY

## 2021-12-13 NOTE — TELEPHONE ENCOUNTER
L/hussain chamorro/[facility] (formally [facility]) requesting last office notes and EMG report to be faxed to [phone]

## 2021-12-16 LAB
7AMINOCLONAZEPAM SAL QL CFM: NEGATIVE
AMPHET SAL QL CFM: NEGATIVE NG/ML
BUPRENORPHINE SAL QL SCN: NEGATIVE NG/ML
CARBOXYTHC SAL QL CFM: ABNORMAL NG/ML
CCP IGG SERPL-ACNC: NEGATIVE
COCAINE SAL QL CFM: NEGATIVE NG/ML
CODEINE SAL QL CFM: NEGATIVE NG/ML
EDDP SAL QL CFM: NEGATIVE NG/ML
HYDROCODONE SAL QL CFM: NORMAL NG/ML
HYDROCODONE SAL QL CFM: NORMAL NG/ML
HYDROMORPHONE SAL QL CFM: NEGATIVE NG/ML
LEUKEMIA MARKERS BLD-IMP: NEGATIVE NG/ML
M PROTEIN 3 UR ELPH-MCNC: NEGATIVE NG/ML
M TB TUBERC IGNF/MITOGEN IGNF CONTROL: NEGATIVE NG/ML
METHADONE SAL QL CFM: NEGATIVE NG/ML
MORPHINE SAL QL CFM: NEGATIVE NG/ML
MORPHINE SAL QL CFM: NEGATIVE NG/ML
OXYMORPHONE SAL QL CFM: NEGATIVE NG/ML
OXYMORPHONE SAL QL CFM: NEGATIVE NG/ML
PCP SAL QL CFM: NEGATIVE NG/ML
RESULT ALL_PRESCRIBED MEDS AND SPECIAL INSTRUCTIONS: NORMAL
SL AMB 6-MAM (HEROIN METABOLITE) QUANTIFICATION: NEGATIVE NG/ML
SL AMB ALPRAZOLAM QUANTIFICATION: NEGATIVE NG/ML
SL AMB ATOMOXETINE QUANTIFICATION: NORMAL
SL AMB CLONAZEPAM QUANTIFICATION: NEGATIVE NG/ML
SL AMB DIAZEPAM QUANTIFICATION: NEGATIVE NG/ML
SL AMB FENTANYL QUANTIFICATION: NEGATIVE NG/ML
SL AMB MDMA QUANTIFICATION: NEGATIVE NG/ML
SL AMB N-DESMETHYL-TRAMADOL QUANTIFICATION SALIVA: ABNORMAL NG/ML
SL AMB NORBUPRENORPHINE QUANTIFICATION: NEGATIVE NG/ML
SL AMB NORDIAZEPAM QUANTIFICATION: NEGATIVE NG/ML
SL AMB NORFENTANYL QUANTIFICATION: NEGATIVE NG/ML
SL AMB NORHYDROCODONE QUANTIFICATION: NORMAL NG/ML
SL AMB NORHYDROCODONE QUANTIFICATION: NORMAL NG/ML
SL AMB NORMEPERIDINE QUANTIFICATION: NEGATIVE NG/ML
SL AMB NOROXYCODONE QUANTIFICATION: NEGATIVE NG/ML
SL AMB OXAZEPAM QUANTIFICATION: NEGATIVE NG/ML
SL AMB RITALINIC ACID QUANTIFICATION: NEGATIVE
SL AMB TEMAZEPAM QUANTIFICATION: NEGATIVE NG/ML
SL AMB TEMAZEPAM QUANTIFICATION: NEGATIVE NG/ML
SL AMB TRAMADOL QUANTIFICATION: ABNORMAL NG/ML
SQUAMOUS #/AREA URNS HPF: NEGATIVE NG/ML

## 2021-12-22 DIAGNOSIS — E11.65 TYPE 2 DIABETES MELLITUS WITH HYPERGLYCEMIA, WITHOUT LONG-TERM CURRENT USE OF INSULIN (HCC): ICD-10-CM

## 2021-12-27 RX ORDER — METFORMIN HYDROCHLORIDE 500 MG/1
TABLET, EXTENDED RELEASE ORAL
Qty: 360 TABLET | Refills: 1 | Status: SHIPPED | OUTPATIENT
Start: 2021-12-27 | End: 2021-12-28

## 2021-12-28 ENCOUNTER — OFFICE VISIT (OUTPATIENT)
Dept: ENDOCRINOLOGY | Facility: HOSPITAL | Age: 56
End: 2021-12-28
Payer: COMMERCIAL

## 2021-12-28 VITALS
HEIGHT: 67 IN | BODY MASS INDEX: 36.85 KG/M2 | HEART RATE: 84 BPM | SYSTOLIC BLOOD PRESSURE: 122 MMHG | DIASTOLIC BLOOD PRESSURE: 82 MMHG | WEIGHT: 234.8 LBS

## 2021-12-28 DIAGNOSIS — E78.2 MIXED HYPERLIPIDEMIA: ICD-10-CM

## 2021-12-28 DIAGNOSIS — E11.42 DIABETIC POLYNEUROPATHY ASSOCIATED WITH TYPE 2 DIABETES MELLITUS (HCC): ICD-10-CM

## 2021-12-28 DIAGNOSIS — E11.65 TYPE 2 DIABETES MELLITUS WITH HYPERGLYCEMIA, WITH LONG-TERM CURRENT USE OF INSULIN (HCC): Primary | ICD-10-CM

## 2021-12-28 DIAGNOSIS — Z79.4 TYPE 2 DIABETES MELLITUS WITH HYPERGLYCEMIA, WITH LONG-TERM CURRENT USE OF INSULIN (HCC): Primary | ICD-10-CM

## 2021-12-28 DIAGNOSIS — I10 PRIMARY HYPERTENSION: ICD-10-CM

## 2021-12-28 PROCEDURE — 99214 OFFICE O/P EST MOD 30 MIN: CPT | Performed by: PHYSICIAN ASSISTANT

## 2021-12-28 RX ORDER — INSULIN DETEMIR 100 [IU]/ML
45 INJECTION, SOLUTION SUBCUTANEOUS DAILY
Qty: 45 ML | Refills: 1 | Status: SHIPPED | OUTPATIENT
Start: 2021-12-28 | End: 2022-02-10 | Stop reason: SDUPTHER

## 2021-12-28 RX ORDER — TAMSULOSIN HYDROCHLORIDE 0.4 MG/1
CAPSULE ORAL
COMMUNITY
Start: 2021-11-15

## 2021-12-28 RX ORDER — INSULIN LISPRO 100 [IU]/ML
5 INJECTION, SOLUTION INTRAVENOUS; SUBCUTANEOUS
Qty: 15 ML | Refills: 1 | Status: SHIPPED | OUTPATIENT
Start: 2021-12-28 | End: 2021-12-28

## 2022-01-24 NOTE — TELEPHONE ENCOUNTER
Pt called to make sure that [facility] received all his remaining notes from  [facility]- informed pt that [facility] hasnt received his notes as of yet- pt calling [facility] to  request office notes and EMG report to be faxed to [phone]

## 2022-01-26 ENCOUNTER — TELEPHONE (OUTPATIENT)
Dept: PAIN MEDICINE | Facility: CLINIC | Age: 57
End: 2022-01-26

## 2022-01-26 NOTE — TELEPHONE ENCOUNTER
Tried to call Pt 3 times - phone was not ringing(just silence), then just ringing - no voicemail to leave a message    Dr Silvana Mccauley would like Pt to be scheduled for f/u with Casper Foster

## 2022-02-10 DIAGNOSIS — Z79.4 TYPE 2 DIABETES MELLITUS WITH HYPERGLYCEMIA, WITH LONG-TERM CURRENT USE OF INSULIN (HCC): ICD-10-CM

## 2022-02-10 DIAGNOSIS — E11.65 TYPE 2 DIABETES MELLITUS WITH HYPERGLYCEMIA, WITH LONG-TERM CURRENT USE OF INSULIN (HCC): ICD-10-CM

## 2022-02-10 RX ORDER — INSULIN DETEMIR 100 [IU]/ML
45 INJECTION, SOLUTION SUBCUTANEOUS DAILY
Qty: 45 ML | Refills: 1 | Status: SHIPPED | OUTPATIENT
Start: 2022-02-10

## 2022-02-21 NOTE — TELEPHONE ENCOUNTER
Pt called stating that the EMG was faxed over along with OV notes pt would like to know if he is ok to schedule

## 2022-04-03 DIAGNOSIS — E11.65 TYPE 2 DIABETES MELLITUS WITH HYPERGLYCEMIA, WITH LONG-TERM CURRENT USE OF INSULIN (HCC): ICD-10-CM

## 2022-04-03 DIAGNOSIS — E11.65 TYPE 2 DIABETES MELLITUS WITH HYPERGLYCEMIA, WITHOUT LONG-TERM CURRENT USE OF INSULIN (HCC): ICD-10-CM

## 2022-04-03 DIAGNOSIS — E11.42 DIABETIC POLYNEUROPATHY ASSOCIATED WITH TYPE 2 DIABETES MELLITUS (HCC): ICD-10-CM

## 2022-04-03 DIAGNOSIS — Z79.4 TYPE 2 DIABETES MELLITUS WITH HYPERGLYCEMIA, WITH LONG-TERM CURRENT USE OF INSULIN (HCC): ICD-10-CM

## 2022-04-04 RX ORDER — PEN NEEDLE, DIABETIC 31 GX5/16"
NEEDLE, DISPOSABLE MISCELLANEOUS
Qty: 200 EACH | Refills: 1 | Status: SHIPPED | OUTPATIENT
Start: 2022-04-04 | End: 2022-04-06 | Stop reason: SDUPTHER

## 2022-04-04 RX ORDER — INSULIN ASPART 100 [IU]/ML
INJECTION, SOLUTION INTRAVENOUS; SUBCUTANEOUS
Qty: 15 ML | Refills: 1 | Status: SHIPPED | OUTPATIENT
Start: 2022-04-04 | End: 2022-04-04

## 2022-04-06 DIAGNOSIS — E11.42 DIABETIC POLYNEUROPATHY ASSOCIATED WITH TYPE 2 DIABETES MELLITUS (HCC): ICD-10-CM

## 2022-04-06 DIAGNOSIS — Z79.4 TYPE 2 DIABETES MELLITUS WITH HYPERGLYCEMIA, WITH LONG-TERM CURRENT USE OF INSULIN (HCC): ICD-10-CM

## 2022-04-06 DIAGNOSIS — E11.65 TYPE 2 DIABETES MELLITUS WITH HYPERGLYCEMIA, WITHOUT LONG-TERM CURRENT USE OF INSULIN (HCC): ICD-10-CM

## 2022-04-06 DIAGNOSIS — E11.65 TYPE 2 DIABETES MELLITUS WITH HYPERGLYCEMIA, WITH LONG-TERM CURRENT USE OF INSULIN (HCC): ICD-10-CM

## 2022-04-06 RX ORDER — INSULIN ASPART 100 [IU]/ML
INJECTION, SOLUTION INTRAVENOUS; SUBCUTANEOUS
Qty: 60 ML | Refills: 1 | Status: SHIPPED | OUTPATIENT
Start: 2022-04-06

## 2022-04-06 RX ORDER — PEN NEEDLE, DIABETIC 31 GX5/16"
NEEDLE, DISPOSABLE MISCELLANEOUS
Qty: 400 EACH | Refills: 3 | Status: SHIPPED | OUTPATIENT
Start: 2022-04-06

## 2022-04-20 LAB
ALBUMIN SERPL-MCNC: 4.6 G/DL (ref 3.6–5.1)
ALBUMIN/GLOB SERPL: 1.8 (CALC) (ref 1–2.5)
ALP SERPL-CCNC: 63 U/L (ref 35–144)
ALT SERPL-CCNC: 24 U/L (ref 9–46)
AST SERPL-CCNC: 24 U/L (ref 10–35)
BILIRUB SERPL-MCNC: 0.5 MG/DL (ref 0.2–1.2)
BUN SERPL-MCNC: 21 MG/DL (ref 7–25)
BUN/CREAT SERPL: ABNORMAL (CALC) (ref 6–22)
CALCIUM SERPL-MCNC: 9.8 MG/DL (ref 8.6–10.3)
CHLORIDE SERPL-SCNC: 103 MMOL/L (ref 98–110)
CHOLEST SERPL-MCNC: 146 MG/DL
CHOLEST/HDLC SERPL: 4.7 (CALC)
CO2 SERPL-SCNC: 29 MMOL/L (ref 20–32)
CREAT SERPL-MCNC: 0.93 MG/DL (ref 0.7–1.33)
GLOBULIN SER CALC-MCNC: 2.5 G/DL (CALC) (ref 1.9–3.7)
GLUCOSE SERPL-MCNC: 103 MG/DL (ref 65–99)
HBA1C MFR BLD: 7.1 % OF TOTAL HGB
HDLC SERPL-MCNC: 31 MG/DL
LDLC SERPL CALC-MCNC: 80 MG/DL (CALC)
NONHDLC SERPL-MCNC: 115 MG/DL (CALC)
POTASSIUM SERPL-SCNC: 4.1 MMOL/L (ref 3.5–5.3)
PROT SERPL-MCNC: 7.1 G/DL (ref 6.1–8.1)
SL AMB EGFR AFRICAN AMERICAN: 105 ML/MIN/1.73M2
SL AMB EGFR NON AFRICAN AMERICAN: 91 ML/MIN/1.73M2
SODIUM SERPL-SCNC: 139 MMOL/L (ref 135–146)
TRIGL SERPL-MCNC: 293 MG/DL
TSH SERPL-ACNC: 1.36 MIU/L (ref 0.4–4.5)

## 2022-04-21 LAB
ALBUMIN/CREAT UR: 5 MCG/MG CREAT
CREAT UR-MCNC: 102 MG/DL (ref 20–320)
MICROALBUMIN UR-MCNC: 0.5 MG/DL

## 2022-04-25 ENCOUNTER — OFFICE VISIT (OUTPATIENT)
Dept: PAIN MEDICINE | Facility: CLINIC | Age: 57
End: 2022-04-25
Payer: COMMERCIAL

## 2022-04-25 VITALS
TEMPERATURE: 98 F | SYSTOLIC BLOOD PRESSURE: 128 MMHG | WEIGHT: 240 LBS | BODY MASS INDEX: 37.67 KG/M2 | HEIGHT: 67 IN | DIASTOLIC BLOOD PRESSURE: 82 MMHG | HEART RATE: 83 BPM

## 2022-04-25 DIAGNOSIS — M48.062 SPINAL STENOSIS OF LUMBAR REGION WITH NEUROGENIC CLAUDICATION: ICD-10-CM

## 2022-04-25 DIAGNOSIS — M47.816 LUMBAR SPONDYLOSIS: ICD-10-CM

## 2022-04-25 DIAGNOSIS — Z79.891 LONG-TERM CURRENT USE OF OPIATE ANALGESIC: ICD-10-CM

## 2022-04-25 DIAGNOSIS — G62.9 POLYNEUROPATHY: ICD-10-CM

## 2022-04-25 DIAGNOSIS — G89.4 CHRONIC PAIN SYNDROME: Primary | ICD-10-CM

## 2022-04-25 DIAGNOSIS — M51.9 INTERVERTEBRAL DISC DISORDER: ICD-10-CM

## 2022-04-25 DIAGNOSIS — M54.50 CHRONIC BILATERAL LOW BACK PAIN WITHOUT SCIATICA: ICD-10-CM

## 2022-04-25 DIAGNOSIS — G89.29 CHRONIC BILATERAL LOW BACK PAIN WITHOUT SCIATICA: ICD-10-CM

## 2022-04-25 DIAGNOSIS — F11.20 UNCOMPLICATED OPIOID DEPENDENCE (HCC): ICD-10-CM

## 2022-04-25 DIAGNOSIS — M51.26 HERNIATED LUMBAR DISC WITHOUT MYELOPATHY: ICD-10-CM

## 2022-04-25 PROCEDURE — 80305 DRUG TEST PRSMV DIR OPT OBS: CPT | Performed by: NURSE PRACTITIONER

## 2022-04-25 PROCEDURE — 99214 OFFICE O/P EST MOD 30 MIN: CPT | Performed by: NURSE PRACTITIONER

## 2022-04-25 RX ORDER — GABAPENTIN 400 MG/1
400 CAPSULE ORAL 4 TIMES DAILY
Qty: 120 CAPSULE | Refills: 1 | Status: SHIPPED | OUTPATIENT
Start: 2022-04-25 | End: 2022-06-14 | Stop reason: SDUPTHER

## 2022-04-25 NOTE — PROGRESS NOTES
Assessment:  1  Chronic pain syndrome    2  Chronic bilateral low back pain without sciatica    3  Polyneuropathy    4  Intervertebral disc disorder    5  Herniated lumbar disc without myelopathy    6  Lumbar spondylosis    7  Spinal stenosis of lumbar region with neurogenic claudication    8  Long-term current use of opiate analgesic        Plan:  While the patient was in the office today, I did have a thorough conversation with the patient regarding their chronic pain syndrome, symptoms, medication regimen, and treatment plan  I discussed with the patient at this point time since he is not interested in surgical options or spinal cord stimulation, and he does have significant underlying stenosis, I do feel that he may be a candidate for long-term opioid management, however, I feel he would be better suited for a long-acting medications such as the Butrans patch or Belbuca in place of the p r n  hydrocodone  However, I did explain to the patient with our office policy regarding medical marijuana that he would have to agree to completely abstain from medical marijuana use from this point on and proceed with a baseline drug screen  The patient was very agreeable to abstaining from the medical marijuana as again he does not use it often and does not feel provide significant relief or improvement  After thorough conversation with the patient we decided that we would try Belbuca 150 mcg, b i d  for pain with the hope that this will provide better overall and were stable relief in place of the p r n  hydrocodone  However, because the patient is due to fill his prescription later on this week because his point of care testing results were appropriate and his previous baseline drug screen early in the year was also appropriate, I feel would be reasonable for now to send a 30 day script for the p r n   Norco as prescribed and then work on the prior authorization for Passpack and we will then transition him to the Belbuca for the 2nd month and proceed from there as appropriate  The patient was agreeable and verbalized an understanding  [place] Prescription Drug Monitoring Program report was reviewed and was appropriate     While the patient was in the office today, I discussed with the patient that as per our medication management office protocol, we do not prescribe any opioid medications before we obtain a baseline drug screen from every patient  The patient was agreeable and a baseline drug screen was collected today  A urine drug screen was collected at today's office visit as part of our medication management protocol  The point of care testing results were appropriate for what was being prescribed  The specimen will be sent for confirmatory testing  The drug screen is medically necessary because the patient is either dependent on opioid medication or is being considered for opioid medication therapy and the results could impact ongoing or future treatment  The drug screen is to evaluate for the presences or absence of prescribed, non-prescribed, and/or illicit drugs/substances  While the patient was in the office today an opioid contract was thoroughly reviewed and signed by the patient  The patient was given adequate time to ask questions in regards to the contract and a signed copy was sent home for his records  There are risks associated with opioid medications, including dependence, addiction and tolerance  The patient understands and agrees to use these medications only as prescribed  Potential side effects of the medications include, but are not limited to, constipation, drowsiness, addiction, impaired judgment and risk of fatal overdose if not taken as prescribed  The patient was warned against driving while taking sedation medications  Sharing medications is a felony  At this point in time, the patient is showing no signs of addiction, abuse, diversion or suicidal ideation      The patient will follow-up in 7 weeks for medication prescription refill and reevaluation  The patient was advised to contact the office should their symptoms worsen in the interim  The patient was agreeable and verbalized an understanding  DIRE SCORE: 16: The patient may be a candidate for long-term opioid analgesia  History of Present Illness: The patient is a 61 y o  male last seen on 12/13/2021 who presents for a follow up office visit in regards to chronic pain syndrome, as the patient's pain has been ongoing for greater than a year, secondary to herniated lumbar disc with spondylosis, stenosis and polyneuropathy secondary to diabetes  The patient currently reports that since his last office visit his pain symptoms have remained the same and presents today to discuss the results of his baseline drug screen and to discuss his treatment plan options as currently he is not interested in injections as they have not been helpful in the past and is currently not interested in surgical options or spinal cord stimulation because the patient's father has a stimulator and isn't doing very well with that so that is not an option he would like to pursue at this time  The patient presents today to discuss possibility of our office taking over prescribing his opioid medications as he has seen us in the past and our office is closer than his current pain management provider  However, the patient does continue with medical marijuana products, although the patient reports he has not been using them and does not feel there significantly enough or as helpful as the p r n  hydrocodone  The patient presents today to discuss his treatment plan options  Current pain medications includes:  Norco 10/325, 1 p o  q i d  for pain  The patient reports that this regimen is providing 70% pain relief  The patient is reporting no side effects from this pain medication regimen      Pain Contract Signed: 4/25/22  Last Urine Drug Screen: 4/25/22    I have personally reviewed and/or updated the patient's past medical history, past surgical history, family history, social history, current medications, allergies, and vital signs today  Review of Systems:    Review of Systems   Respiratory: Negative for shortness of breath  Cardiovascular: Negative for chest pain  Gastrointestinal: Negative for constipation, diarrhea, nausea and vomiting  Musculoskeletal: Positive for gait problem  Negative for arthralgias, joint swelling and myalgias  Skin: Negative for rash  Neurological: Negative for dizziness, seizures and weakness  All other systems reviewed and are negative  Past Medical History:   Diagnosis Date    BPH (benign prostatic hyperplasia)     Lumbar herniated disc     Osteoarthritis        History reviewed  No pertinent surgical history      Family History   Problem Relation Age of Onset    No Known Problems Mother     Diabetes unspecified Father     Heart disease Father         post CABG    No Known Problems Brother        Social History     Occupational History    Occupation:  at [employer]    Smoking status: Current Every Day Smoker     Packs/day: 1 50     Types: Cigarettes    Smokeless tobacco: Never Used   Vaping Use    Vaping Use: Never used   Substance and Sexual Activity    Alcohol use: Yes     Comment: "Not much" per pt    Drug use: Yes     Types: Marijuana     Comment: "Not much" per pt    Sexual activity: Not on file         Current Outpatient Medications:     albuterol (PROVENTIL HFA,VENTOLIN HFA) 90 mcg/act inhaler,  , Disp: , Rfl:     Farxiga 10 MG TABS, TAKE 1 TABLET BY MOUTH EVERY DAY, Disp: 90 tablet, Rfl: 3    gabapentin (NEURONTIN) 400 mg capsule, Take 1 capsule (400 mg total) by mouth 4 (four) times a day, Disp: 120 capsule, Rfl: 1    glipiZIDE (GLUCOTROL) 5 mg tablet, TAKE 2 TABLETS BY MOUTH TWICE A DAY, Disp: 360 tablet, Rfl: 3    HYDROcodone-acetaminophen (NORCO)  mg per tablet, Take 1 PO QID PRN for pain , Disp: 120 tablet, Rfl: 0    ibuprofen (MOTRIN) 200 mg tablet, Take by mouth every 6 (six) hours as needed for mild pain, Disp: , Rfl:     insulin aspart (NovoLOG FlexPen) 100 UNIT/ML injection pen, Inject insulin utilizing sliding scale prior to each meal, using up to 60 units daily  , Disp: 60 mL, Rfl: 1    insulin detemir (Levemir FlexTouch) 100 Units/mL injection pen, Inject 45 Units under the skin daily, Disp: 45 mL, Rfl: 1    lisinopril-hydrochlorothiazide (PRINZIDE,ZESTORETIC) 10-12 5 MG per tablet, Take 1 tablet by mouth daily  , Disp: , Rfl:     loratadine (CLARITIN) 10 mg tablet, Take 10 mg by mouth daily, Disp: , Rfl:     metFORMIN (GLUCOPHAGE) 1000 MG tablet, Take 1 tablet (1,000 mg total) by mouth 2 (two) times a day with meals, Disp: 180 tablet, Rfl: 3    simvastatin (ZOCOR) 20 mg tablet, Take 20 mg by mouth  , Disp: , Rfl:     tamsulosin (FLOMAX) 0 4 mg, , Disp: , Rfl:     CIALIS 20 MG tablet, TAKE 1 TABLET AS NEEDED (Patient not taking: Reported on 10/7/2021), Disp: 10 tablet, Rfl: 2    Insulin Pen Needle (B-D UF III MINI PEN NEEDLES) 31G X 5 MM MISC, Use 4 pen needles daily, Disp: 400 each, Rfl: 3    Insulin Pen Needle 31G X 6 MM MISC, Use twice daily, Disp: 100 each, Rfl: 3    No Known Allergies    Physical Exam:    /82 (BP Location: Left arm, Patient Position: Sitting, Cuff Size: Standard)   Pulse 83   Temp 98 °F (36 7 °C)   Ht 5' 7" (1 702 m)   Wt 109 kg (240 lb)   BMI 37 59 kg/m²     Constitutional:normal, well developed, well nourished, alert, in no distress and non-toxic and no overt pain behavior   and overweight  Eyes:anicteric  HEENT:grossly intact  Neck:supple, symmetric, trachea midline and no masses   Pulmonary:even and unlabored  Cardiovascular:No edema or pitting edema present  Skin:Normal without rashes or lesions and well hydrated  Psychiatric:Mood and affect appropriate  Neurologic:Cranial Nerves II-XII grossly intact  Musculoskeletal:The patient's gait is slightly antalgic, but steady without the use of any assistive devices        Imaging  No orders to display         Orders Placed This Encounter   Procedures    MM ALL_Prescribed Meds and Special Instructions    MM DT_Alprazolam Definitive Test    MM DT_Amphetamine Definitive Test    MM DT_Aripiprazole Definitive Test    MM DT_Bath Salts Definitive Test    MM DT_Buprenorphine Definitive Test    MM DT_Butalbital Definitive Test    MM DT_Clonazepam Definitive Test    MM DT_Clozapine Definitive Test    MM DT_Cocaine Definitive Test    MM DT_Codeine Confirm Positive    MM DT_Validity Creatinine    MM DT_Validity Oxidant    MM DT_Validity pH    MM DT_Validity Specific    MM DT_Desipramine Definitive Test    MM DT_Dextromethorphan Definitive Test    MM Diazepam Definitive Test    MM DT_Ethyl Glucuronide/Ethyl Sulfate Definitive Test    MM DT_Fentanyl Definitive Test    MM DT_Haloperidol Definitive Test    MM DT_Heroin Definitive Test    MM DT_Hydrocodone Definitive Test    MM DT_Hydromorphone Definitive Test    MM DT_Imipramine Definitive Test    MM DT_Kratom Definitive Test    MM DT_Levorphanol Definitive Test    MM Lorazepam Definitive Test    MM DT_MDMA Definitive Test    MM DT_Meperidine Definitive Test    MM DT_Methadone Definitive Test    MM DT_Methamphetamine Definitive Test    MM DT_Methylphenidate Definitive Test    MM DT_Morphine Definitive Test    MM DT_Olanzapine Definitive Test    MM DT_Oxazepam Definitive Test    MM DT_Oxycodone Definitive Test    MM DT_Oxymorphone Definitive Test    MM DT_Phencyclidine Definitive Test    MM DT_Phenobarbital Definitive Test    MM DT_Phentermine Definitive Test    MM DT_Quetiapine Definitive Test    MM DT_Risperidone Definitive Test    MM DT_Secobarbital Definitive Test    MM DT_Spice Definitive Test    MM DT_Tapentadol Definitive Test    MM DT_Temazapam Definitive Test    MM DT_THC Definitive Test    MM DT_Tramadol Definitive Test

## 2022-04-25 NOTE — PATIENT INSTRUCTIONS
Opioid Safety   AMBULATORY CARE:   Opioid safety  includes the correct use, storage, and disposal of opioids  Examples of opioid medicines to treat pain include oxycodone, morphine, fentanyl, and codeine  Call your local emergency number (911 in the [address]), or have someone else call if:   · You have a seizure  · You cannot be woken  · You have trouble staying awake and your breathing is slow or shallow  · Your speech is slurred, or you are confused  · You are dizzy or stumble when you walk  Call your doctor, or have someone close to you call if:   · You are extremely drowsy, or you have trouble staying awake or speaking  · You have pale or clammy skin  · You have blue fingernails or lips  · Your heartbeat is slower than normal     · You cannot stop vomiting  · You have questions or concerns about your condition or care  Use opioids safely:   · Take prescribed opioids exactly as directed  Opioids come with directions based on the kind of opioid and how it is given  Do not take more than the recommended amount, or for longer than needed  · Do not give opioids to others or take opioids that belong to someone else  Misuse of opioids can lead to an addiction or overdose  · Do not mix opioids with other medicines or alcohol  The combination can cause an overdose, or lead to a coma  · Do not drive or operate heavy machinery after you take the opioid  Your provider or pharmacist can tell you how long to wait after a dose before you do these activities  · Talk to your healthcare provider if you have any side effects  He or she can help you prevent or relieve side effects  Side effects include nausea, sleepiness, itching, and trouble thinking clearly  Manage constipation:  Constipation is the most common side effect of opioid medicine  Constipation is when you have hard, dry bowel movements, or you go longer than usual between bowel movements   Tell your healthcare provider about all changes in your bowel movements while you are taking opioids  He or she may recommend laxative medicine to help you have a bowel movement  He or she may also change the kind of opioid you are taking, or change when you take it  The following are more ways you can prevent or relieve constipation:  · Drink liquids as directed  You may need to drink extra liquids to help soften and move your bowels  Ask how much liquid to drink each day and which liquids are best for you  · Eat high-fiber foods  This may help decrease constipation by adding bulk to your bowel movements  High-fiber foods include fruits, vegetables, whole-grain breads and cereals, and beans  Your healthcare provider or dietitian can help you create a high-fiber meal plan  Your provider may also recommend a fiber supplement if you cannot get enough fiber from food  · Exercise regularly  Regular physical activity can help stimulate your intestines  Walking is a good exercise to prevent or relieve constipation  Ask which exercises are best for you  · Schedule a time each day to have a bowel movement  This may help train your body to have regular bowel movements  Bend forward while you are on the toilet to help move the bowel movement out  Sit on the toilet for at least 10 minutes, even if you do not have a bowel movement  Store opioids safely:   · Store opioids where others cannot easily get them  Keep them in a locked cabinet or secure area  Do not  keep them in a purse or other bag you carry with you  A person may be looking for something else and find the opioids  · Make sure opioids are stored out of the reach of children  A child can easily overdose on opioids  Opioids may look like candy to a small child  The best way to dispose of opioids: The laws vary by country and area   In the [place], the best way is to return the opioids through a take-back program  This program is offered by the [employer] ([address] [facility])  The following are options for using the program:  · Take the opioids to a [employer] collection site  The site is often a law enforcement center  Call your local law enforcement center for scheduled take-back days in your area  You will be given information on where to go if the collection site is in a different location  · Take the opioids to an approved pharmacy or hospital   A pharmacy or hospital may be set up as a collection site  You will need to ask if it is a [employer] collection site if you were not directed there  A pharmacy or doctor's office may not be able to take back opioids unless it is a [employer] site  · Use a mail-back system  This means you are given containers to put the opioids into  You will then mail them in the containers  · Use a take-back drop box  This is a place to leave the opioids at any time  People and animals will not be able to get into the box  Your local law enforcement agency can tell you where to find a drop box in your area  Other safe ways to dispose of opioids: The medicine may come with disposal instructions  The instructions may vary depending on the brand of medicine you are using  Instructions may come in a Medication Guide, but not every medicine has one  You may instead get instructions from your pharmacy or doctor  Follow instructions carefully  The following are general guidelines to follow:  · Find out if you can flush the opioid  Some opioids can be flushed down the toilet or poured into the sink  You will need to contact authorities in your area to see if this is an option for you  The FDA also offers a list of medicines that are safe to flush down the toilet  You can check the list if you cannot get the information for your local area  · Ask your waste management company about rules for putting opioids in the trash  The company will be able to give you specific directions   Scratch out personal information on the original medicine label so it cannot be read  Then put it in the trash  Do not label the trash or put any information on it about the opioids  It should look like regular household trash so no one is tempted to look for the opioids  Keep the trash out of the reach of children and animals  Always make sure trash is secure  · Talk to officials if you live in a facility  If you live in a nursing home or assisted living center, talk to an official  The person will know the rules for your area  Other ways to manage pain:   · Ask your healthcare provider about non-opioid medicines to control pain  Nonprescription medicines include NSAIDs (such as ibuprofen) and acetaminophen  Prescription medicines include muscle relaxers, antidepressants, and steroids  · Pain may be managed without any medicines  Some ways to relieve pain include massage, aromatherapy, or meditation  Physical or occupational therapy may also help  For more information:   · Drug Enforcement Administration  [facility] [address]  Phone: 2- 672 - [phone]  Web Address: [facility]/drug_disposal/    · [address]  and Drug Administration  [facility] , [address] [place] [address]  Phone: 4- 552 - [phone]  Web Address: http://[employer]/    Follow up with your doctor or pain specialist as directed: You may need to have your dose adjusted  Your doctor or pain specialist can also help you find ways to manage pain without opioids  Write down your questions so you remember to ask them during your visits  © Copyright [employer] 2022 Information is for End User's use only and may not be sold, redistributed or otherwise used for commercial purposes  All illustrations and images included in CareNotes® are the copyrighted property of A D A M , Inc  or Elizabeth Alicia  The above information is an  only  It is not intended as medical advice for individual conditions or treatments   Talk to your doctor, nurse or pharmacist before following any medical regimen to see if it is safe and effective for you

## 2022-04-26 ENCOUNTER — TELEPHONE (OUTPATIENT)
Dept: PAIN MEDICINE | Facility: CLINIC | Age: 57
End: 2022-04-26

## 2022-04-26 DIAGNOSIS — M47.816 LUMBAR SPONDYLOSIS: ICD-10-CM

## 2022-04-26 DIAGNOSIS — M51.26 HERNIATED LUMBAR DISC WITHOUT MYELOPATHY: ICD-10-CM

## 2022-04-26 DIAGNOSIS — G89.29 CHRONIC BILATERAL LOW BACK PAIN WITHOUT SCIATICA: ICD-10-CM

## 2022-04-26 DIAGNOSIS — G89.4 CHRONIC PAIN SYNDROME: Primary | ICD-10-CM

## 2022-04-26 DIAGNOSIS — M54.50 CHRONIC BILATERAL LOW BACK PAIN WITHOUT SCIATICA: ICD-10-CM

## 2022-04-26 RX ORDER — HYDROCODONE BITARTRATE AND ACETAMINOPHEN 10; 325 MG/1; MG/1
TABLET ORAL
Qty: 120 TABLET | Refills: 0 | Status: SHIPPED | OUTPATIENT
Start: 2022-04-26 | End: 2022-08-09 | Stop reason: SDUPTHER

## 2022-04-26 NOTE — TELEPHONE ENCOUNTER
Can you please call the patient and then let him know that his point of care testing results from his drug screen yesterday were appropriate so I did send a prescription for the p r n  Norco 10/325, 1 p o  q i d  p r n  for pain, dispense number 120 pills to the [facility] on file to be filled later on this week when appropriate and when the insurance will allow  In the meantime we will work on the prior authorization for the [facility] as we discussed at his office visit and the hope is that for the 2nd month we will have the approval for the [facility] and then he will transition from the hydrocodone to the [facility] and then we will follow-up with him as scheduled in 7 weeks  Can you please send this task back to me want to have talked to the patient?

## 2022-04-27 LAB
6MAM UR QL CFM: NEGATIVE NG/ML
7AMINOCLONAZEPAM UR QL CFM: NEGATIVE NG/ML
A-OH ALPRAZ UR QL CFM: NEGATIVE NG/ML
ACCEPTABLE CREAT UR QL: NORMAL MG/DL
ACCEPTIBLE SP GR UR QL: NORMAL
AMPHET UR QL CFM: NEGATIVE NG/ML
AMPHET UR QL CFM: NEGATIVE NG/ML
BUPRENORPHINE UR QL CFM: ABNORMAL NG/ML
BUTALBITAL UR QL CFM: NEGATIVE NG/ML
BZE UR QL CFM: NEGATIVE NG/ML
CODEINE UR QL CFM: NORMAL
DESIPRAMINE UR QL CFM: NEGATIVE NG/ML
DESIPRAMINE UR QL CFM: NEGATIVE NG/ML
EDDP UR QL CFM: NEGATIVE NG/ML
ETHYL GLUCURONIDE UR QL CFM: NEGATIVE NG/ML
ETHYL SULFATE UR QL SCN: NEGATIVE NG/ML
EUTYLONE UR QL: NEGATIVE NG/ML
FENTANYL UR QL CFM: NEGATIVE NG/ML
GLIADIN IGG SER IA-ACNC: NEGATIVE NG/ML
GLUCOSE 30M P 50 G LAC PO SERPL-MCNC: NEGATIVE NG/ML
HYDROCODONE UR QL CFM: NORMAL NG/ML
HYDROMORPHONE UR QL CFM: NORMAL NG/ML
IMIPRAMINE UR QL CFM: NEGATIVE NG/ML
LORAZEPAM UR QL CFM: NEGATIVE NG/ML
MDMA UR QL CFM: NEGATIVE NG/ML
ME-PHENIDATE UR QL CFM: NEGATIVE NG/ML
MEPERIDINE UR QL CFM: NEGATIVE NG/ML
METHADONE UR QL CFM: NEGATIVE NG/ML
METHAMPHET UR QL CFM: NEGATIVE NG/ML
MORPHINE UR QL CFM: NEGATIVE NG/ML
NITRITE UR QL: NORMAL UG/ML
NORBUPRENORPHINE UR QL CFM: ABNORMAL NG/ML
NORDIAZEPAM UR QL CFM: NEGATIVE NG/ML
NORFENTANYL UR QL CFM: NEGATIVE NG/ML
NORHYDROCODONE UR QL CFM: NORMAL NG/ML
NORMEPERIDINE UR QL CFM: NEGATIVE NG/ML
NOROXYCODONE UR QL CFM: NEGATIVE NG/ML
OLANZAPINE QUANTIFICATION: NEGATIVE NG/ML
OPC-3373 QUANTIFICATION: NEGATIVE
OXAZEPAM UR QL CFM: NEGATIVE NG/ML
OXYCODONE UR QL CFM: NEGATIVE NG/ML
OXYMORPHONE UR QL CFM: NEGATIVE NG/ML
OXYMORPHONE UR QL CFM: NEGATIVE NG/ML
PCP UR QL CFM: NEGATIVE NG/ML
PHENOBARB UR QL CFM: NEGATIVE NG/ML
RESULT ALL_PRESCRIBED MEDS AND SPECIAL INSTRUCTIONS: NORMAL
SECOBARBITAL UR QL CFM: NEGATIVE NG/ML
SL AMB 3-METHYL-FENTANYL QUANTIFICATION: NORMAL NG/ML
SL AMB 4-ANPP QUANTIFICATION: NORMAL NG/ML
SL AMB 4-FIBF QUANTIFICATION: NORMAL NG/ML
SL AMB 5F-ADB-M7 METABOLITE QUANTIFICATION: NEGATIVE NG/ML
SL AMB 7-OH-MITRAGYNINE (KRATOM ALKALOID) QUANTIFICATION: NEGATIVE NG/ML
SL AMB AB-FUBINACA-M3 METABOLITE QUANTIFICATION: NEGATIVE NG/ML
SL AMB ACETYL FENTANYL QUANTIFICATION: NORMAL NG/ML
SL AMB ACETYL NORFENTANYL QUANTIFICATION: NORMAL NG/ML
SL AMB ACRYL FENTANYL QUANTIFICATION: NORMAL NG/ML
SL AMB BUTRYL FENTANYL QUANTIFICATION: NORMAL NG/ML
SL AMB CARFENTANIL QUANTIFICATION: NORMAL NG/ML
SL AMB CLOZAPINE QUANTIFICATION: NEGATIVE NG/ML
SL AMB CTHC (MARIJUANA METABOLITE) QUANTIFICATION: NEGATIVE NG/ML
SL AMB CYCLOPROPYL FENTANYL QUANTIFICATION: NORMAL NG/ML
SL AMB DEXTROMETHORPHAN QUANTIFICATION: NEGATIVE NG/ML
SL AMB DEXTRORPHAN (DEXTROMETHORPHAN METABOLITE) QUANT: NEGATIVE NG/ML
SL AMB DEXTRORPHAN (DEXTROMETHORPHAN METABOLITE) QUANT: NEGATIVE NG/ML
SL AMB FURANYL FENTANYL QUANTIFICATION: NORMAL NG/ML
SL AMB HALOPERIDOL  QUANTIFICATION: NEGATIVE NG/ML
SL AMB HALOPERIDOL METABOLITE QUANTIFICATION: NEGATIVE NG/ML
SL AMB HYDROXYRISPERIDONE QUANTIFICATION: NEGATIVE NG/ML
SL AMB JWH018 METABOLITE QUANTIFICATION: NEGATIVE NG/ML
SL AMB JWH073 METABOLITE QUANTIFICATION: NEGATIVE NG/ML
SL AMB MDMB-FUBINACA-M1 METABOLITE QUANTIFICATION: NEGATIVE NG/ML
SL AMB METHOXYACETYL FENTANYL QUANTIFICATION: NORMAL NG/ML
SL AMB METHYLONE QUANTIFICATION: NORMAL NG/ML
SL AMB N-DESMETHYL U-47700 QUANTIFICATION: NORMAL NG/ML
SL AMB N-DESMETHYL-TRAMADOL QUANTIFICATION: NEGATIVE NG/ML
SL AMB N-DESMETHYLCLOZAPINE QUANTIFICATION: NEGATIVE NG/ML
SL AMB NORQUETIAPINE QUANTIFICATION: NEGATIVE NG/ML
SL AMB PHENTERMINE QUANTIFICATION: NEGATIVE NG/ML
SL AMB QUETIAPINE QUANTIFICATION: NEGATIVE NG/ML
SL AMB RCS4 METABOLITE QUANTIFICATION: NEGATIVE NG/ML
SL AMB RISPERIDONE QUANTIFICATION: NEGATIVE NG/ML
SL AMB RITALINIC ACID QUANTIFICATION: NEGATIVE NG/ML
SL AMB U-47700 QUANTIFICATION: NORMAL NG/ML
SPECIMEN DRAWN SERPL: NEGATIVE NG/ML
SPECIMEN PH ACCEPTABLE UR: NORMAL
TAPENTADOL UR QL CFM: NEGATIVE NG/ML
TEMAZEPAM UR QL CFM: NEGATIVE NG/ML
TEMAZEPAM UR QL CFM: NEGATIVE NG/ML
TRAMADOL UR QL CFM: NEGATIVE NG/ML
URATE/CREAT 24H UR: NEGATIVE NG/ML

## 2022-04-28 ENCOUNTER — OFFICE VISIT (OUTPATIENT)
Dept: ENDOCRINOLOGY | Facility: HOSPITAL | Age: 57
End: 2022-04-28

## 2022-04-28 VITALS
BODY MASS INDEX: 37.98 KG/M2 | HEIGHT: 67 IN | WEIGHT: 242 LBS | DIASTOLIC BLOOD PRESSURE: 70 MMHG | OXYGEN SATURATION: 94 % | SYSTOLIC BLOOD PRESSURE: 110 MMHG | HEART RATE: 86 BPM

## 2022-04-28 DIAGNOSIS — E11.65 TYPE 2 DIABETES MELLITUS WITH HYPERGLYCEMIA, WITH LONG-TERM CURRENT USE OF INSULIN (HCC): Primary | ICD-10-CM

## 2022-04-28 DIAGNOSIS — Z79.4 TYPE 2 DIABETES MELLITUS WITH HYPERGLYCEMIA, WITH LONG-TERM CURRENT USE OF INSULIN (HCC): Primary | ICD-10-CM

## 2022-04-28 RX ORDER — HYDROCODONE BITARTRATE AND ACETAMINOPHEN 10; 325 MG/1; MG/1
TABLET ORAL
Qty: 120 TABLET | Refills: 0 | Status: SHIPPED | OUTPATIENT
Start: 2022-04-28 | End: 2022-08-09 | Stop reason: SDUPTHER

## 2022-04-28 NOTE — TELEPHONE ENCOUNTER
Can you please let the patient know that we checked with his insurance and the [address] NOT require a prior authorization, HOWEVER, it is going to be over $300 per month until he meets his deductible and then it will decrease  If he is not comfortable paying that, then for the 2nd month, I will send another script for the PRN Ulisses with a DNFill date of 5/24/22 that will be on file at his pharmacy  He will need to call the pharmacy that day or later to have it filled  If they tell him it isn't there he needs to let them know it was sent in April  We will re-group about different long acting med options that may be more affordable at his next OV then  Thank you

## 2022-04-28 NOTE — TELEPHONE ENCOUNTER
S/w pt, advised of above  Pt stated that he cannot afford the belbucca and agreed with the norco plan  Advised pt, that rx will be sent today  Fu w/ the pharmacy as directed  Please cb if questions or issues arise  Pt verbalized understanding and appreciation   Confirmed 6/14 fu ov w/ DG

## 2022-04-28 NOTE — TELEPHONE ENCOUNTER
I spoke with Denise from [address] [place] [address] she said the price for the medication is over 300  She said the majority of that is his deductible  But no PA is required    ID# [id number]  [phone]

## 2022-04-28 NOTE — PATIENT INSTRUCTIONS
Continue to monitor diet, maintain physical activity   Make sure to drink plenty of water throughout the day      Continue Levemir 45 units at night  Continue with metformin 1000 mg 1 tablets twice a day and glipizide 5 mg 2 tablets twice a day  Farxiga 10 mg daily      Continue Humalog 18 unit with each meal      Call insurance about Rybelsus      Continue with simvastatin for hyperlipidemia      Complete blood sugar log for 2 weeks and send into the office       Get lab work completed prior to 6 month follow-up

## 2022-04-28 NOTE — PROGRESS NOTES
Abhishek King  61 y o  male MRN: [id number]    Encounter: [id number]      Assessment/Plan     Assessment: This is a 61y o -year-old male with type 2 diabetes with neuropathy, hypertension and hyperlipidemia  Plan:  1  Type 2 diabetes:  Most recent hemoglobin A1c has decreased to 7 1  He will continue with metformin 1000 mg twice a day, Farxiga 10 mg daily and glipizide 5 mg 2 tablets twice a day  At this time will continue with Levemir 45 units daily, and Humalog 18 units with each meal   Continue checking blood sugar 4 more times a day  I encouraged him to send in blood sugar logs in about 2-4 weeks  Contact the office if there is any concerns or questions  Follow-up in 6 months with lab work completed prior to visit      2  Diabetic neuropathy:  Stable  Diabetic foot exam is up-to-date      3  Hypertension:  Normotensive in the office today  Kidney function remains stable with normal electrolytes   Continue with current medications       4  Hyperlipidemia:  Cholesterol doing well   Will continue with current medication  Repeat lipid panel prior to next office visit  CC: Diabetes    History of Present Illness     HPI:  Kenyatta Wang Jr  is H 22 y o  year old male with type 2 diabetes for 3 years   He is on oral agents and insulin at home and takes Metformin  mg 2 tablets twice a day, Farxiga 10 mg daily, and glipizide 5 mg 2 tablets twice a day, levemir 45 units daily, and Humalog 18 units with each meal   He denies nephropathy, retinopathy, heart attack, stroke and claudication but does admit to neuropathy, recurrent ulcers, polydipsia, polyphagia, polyuria   Overall doing well today without any concerns or questions  Has been doing well with the mealtime insulin  Has been making adjustments on his own    Does admit to poor dietary habits causing the higher blood sugar numbers      Hypoglycemic episodes: No       The patient's last eye exam was approximately 3 years ago  Helen Busch patient's last foot exam was in  February 2021  Was seen by Podiatry recently as he had an infection of the right great toe      Blood Sugar/Glucometer/Pump/CGM review:  Download of glucometer shows that he is checking blood sugar 4 times a day  Average glucose level over the last 30 days was 172  Blood sugars in the morning are typically within target range, of note prior to lunch he seems to be running a little bit higher, but calms down later in the day          He has hypertension and takes lisinopril / HCTZ 10/12 5 mg daily      He has hyperlipidemia and takes simvastatin 20 mg daily  Review of Systems   Constitutional: Negative for chills, fatigue and fever  HENT: Negative for congestion, ear pain, hearing loss, rhinorrhea and sore throat  Eyes: Negative for pain and visual disturbance  Respiratory: Negative for cough, shortness of breath and wheezing  Cardiovascular: Negative for chest pain, palpitations and leg swelling  Gastrointestinal: Negative for abdominal pain, blood in stool, constipation, diarrhea, nausea and vomiting  Endocrine: Negative for cold intolerance, heat intolerance and polyuria  Genitourinary: Negative for difficulty urinating, dysuria, frequency, hematuria and urgency  Musculoskeletal: Negative for arthralgias, joint swelling and myalgias  Skin: Negative for rash and wound  Neurological: Negative for dizziness, syncope, weakness, numbness and headaches  Psychiatric/Behavioral: Negative for dysphoric mood and sleep disturbance  The patient is not nervous/anxious  Historical Information   Past Medical History:   Diagnosis Date    BPH (benign prostatic hyperplasia)     Lumbar herniated disc     Osteoarthritis      History reviewed  No pertinent surgical history    Social History   Social History     Substance and Sexual Activity   Alcohol Use Yes    Comment: "Not much" per pt     Social History     Substance and Sexual Activity   Drug Use Yes    Types: Marijuana    Comment: "Not much" per pt     Social History     Tobacco Use   Smoking Status Current Every Day Smoker    Packs/day: 1 50    Types: Cigarettes   Smokeless Tobacco Never Used     Family History:   Family History   Problem Relation Age of Onset    No Known Problems Mother     Diabetes unspecified Father     Heart disease Father         post CABG    No Known Problems Brother        Meds/Allergies   Current Outpatient Medications   Medication Sig Dispense Refill    Farxiga 10 MG TABS TAKE 1 TABLET BY MOUTH EVERY DAY 90 tablet 3    gabapentin (NEURONTIN) 400 mg capsule Take 1 capsule (400 mg total) by mouth 4 (four) times a day (Patient taking differently: Take 800 mg by mouth 4 (four) times a day  ) 120 capsule 1    glipiZIDE (GLUCOTROL) 5 mg tablet TAKE 2 TABLETS BY MOUTH TWICE A  tablet 3    HYDROcodone-acetaminophen (NORCO)  mg per tablet Take 1 PO QID PRN for pain  120 tablet 0    HYDROcodone-acetaminophen (NORCO)  mg per tablet Take 1 PO QID PRN for pain  DO NOT FILL UNTIL: 5/24/22 120 tablet 0    ibuprofen (MOTRIN) 200 mg tablet Take by mouth every 6 (six) hours as needed for mild pain      insulin aspart (NovoLOG FlexPen) 100 UNIT/ML injection pen Inject insulin utilizing sliding scale prior to each meal, using up to 60 units daily   60 mL 1    insulin detemir (Levemir FlexTouch) 100 Units/mL injection pen Inject 45 Units under the skin daily 45 mL 1    lisinopril-hydrochlorothiazide (PRINZIDE,ZESTORETIC) 10-12 5 MG per tablet Take 1 tablet by mouth daily        loratadine (CLARITIN) 10 mg tablet Take 10 mg by mouth daily      metFORMIN (GLUCOPHAGE) 1000 MG tablet Take 1 tablet (1,000 mg total) by mouth 2 (two) times a day with meals 180 tablet 3    simvastatin (ZOCOR) 20 mg tablet Take 20 mg by mouth        tamsulosin (FLOMAX) 0 4 mg       albuterol (PROVENTIL HFA,VENTOLIN HFA) 90 mcg/act inhaler   (Patient not taking: Reported on 4/28/2022 )      CIALIS 20 MG tablet TAKE 1 TABLET AS NEEDED (Patient not taking: Reported on 10/7/2021) 10 tablet 2    Insulin Pen Needle (B-D UF III MINI PEN NEEDLES) 31G X 5 MM MISC Use 4 pen needles daily 400 each 3    Insulin Pen Needle 31G X 6 MM MISC Use twice daily 100 each 3     No current facility-administered medications for this visit  No Known Allergies    Objective   Vitals: Blood pressure 110/70, pulse 86, height 5' 7" (1 702 m), weight 110 kg (242 lb), SpO2 94 %  Physical Exam  Vitals and nursing note reviewed  Constitutional:       General: He is not in acute distress  Appearance: Normal appearance  He is not diaphoretic  HENT:      Head: Normocephalic and atraumatic  Eyes:      General: No scleral icterus  Extraocular Movements: Extraocular movements intact  Conjunctiva/sclera: Conjunctivae normal       Pupils: Pupils are equal, round, and reactive to light  Cardiovascular:      Rate and Rhythm: Normal rate and regular rhythm  Pulses: no weak pulses          Dorsalis pedis pulses are 2+ on the right side and 2+ on the left side  Posterior tibial pulses are 2+ on the right side and 2+ on the left side  Heart sounds: No murmur heard  Pulmonary:      Effort: Pulmonary effort is normal  No respiratory distress  Breath sounds: Normal breath sounds  No wheezing  Musculoskeletal:      Right lower leg: No edema  Left lower leg: No edema  Feet:      Right foot:      Skin integrity: No ulcer, skin breakdown, erythema, warmth, callus or dry skin  Left foot:      Skin integrity: No ulcer, skin breakdown, erythema, warmth, callus or dry skin  Lymphadenopathy:      Cervical: No cervical adenopathy  Skin:     General: Skin is warm and dry  Neurological:      Mental Status: He is alert and oriented to person, place, and time  Mental status is at baseline  Sensory: No sensory deficit        Gait: Gait normal    Psychiatric:         Mood and Affect: Mood normal  Behavior: Behavior normal          Thought Content: Thought content normal      Patient's shoes and socks removed  Right Foot/Ankle   Right Foot Inspection  Skin Exam: skin normal and skin intact  No dry skin, no warmth, no callus, no erythema, no maceration, no abnormal color, no pre-ulcer, no ulcer and no callus  Toe Exam: ROM and strength within normal limits  No tenderness, erythema and  no right toe deformity    Sensory   Vibration: diminished  Proprioception: intact  Monofilament testing: diminished    Vascular  Capillary refills: < 3 seconds  The right DP pulse is 2+  The right PT pulse is 2+  Left Foot/Ankle  Left Foot Inspection  Skin Exam: skin normal and skin intact  No dry skin, no warmth, no erythema, no maceration, normal color, no pre-ulcer, no ulcer and no callus  Toe Exam: ROM and strength within normal limits  No tenderness, no erythema and no left toe deformity  Sensory   Vibration: diminished  Proprioception: intact  Monofilament testing: diminished    Vascular  Capillary refills: < 3 seconds  The left DP pulse is 2+  The left PT pulse is 2+  Assign Risk Category  No deformity present  Loss of protective sensation  No weak pulses  Risk: 1        The history was obtained from the review of the chart, patient      Lab Results:   Lab Results   Component Value Date/Time    Hemoglobin A1C 7 1 (H) 04/20/2022 07:46 AM    Hemoglobin A1C 8 1 (H) 12/10/2021 07:06 AM    Hemoglobin A1C 9 0 09/15/2021 12:00 AM    BUN 21 04/20/2022 07:46 AM    BUN 23 12/10/2021 07:06 AM    Potassium 4 1 04/20/2022 07:46 AM    Potassium 4 2 12/10/2021 07:06 AM    Chloride 103 04/20/2022 07:46 AM    Chloride 102 12/10/2021 07:06 AM    CO2 29 04/20/2022 07:46 AM    CO2 28 12/10/2021 07:06 AM    Creatinine 0 93 04/20/2022 07:46 AM    Creatinine 0 96 12/10/2021 07:06 AM    AST 24 04/20/2022 07:46 AM    AST 22 12/10/2021 07:06 AM    ALT 24 04/20/2022 07:46 AM    ALT 28 12/10/2021 07:06 AM    Albumin 4 6 04/20/2022 07:46 AM    Albumin 4 6 12/10/2021 07:06 AM    Globulin 2 5 04/20/2022 07:46 AM    Globulin 2 5 12/10/2021 07:06 AM    HDL 31 (L) 04/20/2022 07:46 AM    Triglycerides 293 (H) 04/20/2022 07:46 AM         Portions of the record may have been created with voice recognition software  Occasional wrong word or "sound a like" substitutions may have occurred due to the inherent limitations of voice recognition software  Read the chart carefully and recognize, using context, where substitutions have occurred

## 2022-06-09 DIAGNOSIS — E11.9 TYPE 2 DIABETES MELLITUS WITHOUT COMPLICATION, WITHOUT LONG-TERM CURRENT USE OF INSULIN (HCC): ICD-10-CM

## 2022-06-09 RX ORDER — DAPAGLIFLOZIN 10 MG/1
TABLET, FILM COATED ORAL
Qty: 90 TABLET | Refills: 3 | Status: SHIPPED | OUTPATIENT
Start: 2022-06-09 | End: 2022-08-09

## 2022-06-14 ENCOUNTER — OFFICE VISIT (OUTPATIENT)
Dept: PAIN MEDICINE | Facility: CLINIC | Age: 57
End: 2022-06-14
Payer: COMMERCIAL

## 2022-06-14 VITALS
TEMPERATURE: 98 F | HEART RATE: 84 BPM | HEIGHT: 67 IN | DIASTOLIC BLOOD PRESSURE: 80 MMHG | BODY MASS INDEX: 37.67 KG/M2 | SYSTOLIC BLOOD PRESSURE: 128 MMHG | WEIGHT: 240 LBS

## 2022-06-14 DIAGNOSIS — G62.9 POLYNEUROPATHY: ICD-10-CM

## 2022-06-14 DIAGNOSIS — M51.9 INTERVERTEBRAL DISC DISORDER: ICD-10-CM

## 2022-06-14 DIAGNOSIS — M47.816 LUMBAR SPONDYLOSIS: ICD-10-CM

## 2022-06-14 DIAGNOSIS — M48.062 SPINAL STENOSIS OF LUMBAR REGION WITH NEUROGENIC CLAUDICATION: ICD-10-CM

## 2022-06-14 DIAGNOSIS — M51.26 HERNIATED LUMBAR DISC WITHOUT MYELOPATHY: ICD-10-CM

## 2022-06-14 DIAGNOSIS — G89.4 CHRONIC PAIN SYNDROME: Primary | ICD-10-CM

## 2022-06-14 DIAGNOSIS — M54.50 CHRONIC BILATERAL LOW BACK PAIN WITHOUT SCIATICA: ICD-10-CM

## 2022-06-14 DIAGNOSIS — G89.29 CHRONIC BILATERAL LOW BACK PAIN WITHOUT SCIATICA: ICD-10-CM

## 2022-06-14 PROCEDURE — 99214 OFFICE O/P EST MOD 30 MIN: CPT | Performed by: NURSE PRACTITIONER

## 2022-06-14 RX ORDER — HYDROCODONE BITARTRATE AND ACETAMINOPHEN 10; 325 MG/1; MG/1
TABLET ORAL
Qty: 120 TABLET | Refills: 0 | Status: CANCELLED | OUTPATIENT
Start: 2022-06-14

## 2022-06-14 RX ORDER — MORPHINE SULFATE 15 MG/1
TABLET, FILM COATED, EXTENDED RELEASE ORAL
Qty: 60 TABLET | Refills: 0 | Status: SHIPPED | OUTPATIENT
Start: 2022-06-14 | End: 2022-07-08

## 2022-06-14 RX ORDER — GABAPENTIN 400 MG/1
CAPSULE ORAL
Qty: 120 CAPSULE | Refills: 1 | Status: SHIPPED | OUTPATIENT
Start: 2022-06-14 | End: 2022-08-09 | Stop reason: SDUPTHER

## 2022-06-14 NOTE — PATIENT INSTRUCTIONS

## 2022-06-14 NOTE — PROGRESS NOTES
Assessment:  1  Chronic pain syndrome    2  Chronic bilateral low back pain without sciatica    3  Polyneuropathy    4  Intervertebral disc disorder    5  Herniated lumbar disc without myelopathy    6  Lumbar spondylosis    7  Spinal stenosis of lumbar region with neurogenic claudication        Plan:  While the patient was in the office today, I did have a thorough conversation with the patient regarding their chronic pain syndrome, symptoms, medication regimen, and treatment plan  I discussed with the patient that at this point in time since I still feel he would do better on a long-acting medication and we have to keep cost in mind and he does have support him underlying etiology and has tried and failed every other alternative medication management and treatment plan options, we could discontinue the p r n  Norco and try morphine sulfate ER 15 mg b i d  to see if that would provide more stable and long-lasting relief on a consistent basis  The patient will continue the gabapentin as prescribed  I advised the patient that if they experience any side effects or issues with the changes in their medication regiment, they should give our office a call to discuss  I also advised the patient not to drive or operate machinery until they see how the changes in the medication regimen affects them  The patient was agreeable and verbalized an understanding  I did discuss with the patient that overall I understand that with his chronic pain can be very trying both physically and mentally and that he may want to seriously consider a consultation with our psychotherapist to see if he would be a good candidate for cognitive behavioral therapy to help him except his current situation and to also provide some tools to help him deal with an manage his pain in the future    At this point the patient was at least agreeable to taking the therapist's card and if he would like to follow up he will call to schedule an appointment  [place] Prescription Drug Monitoring Program report was reviewed and was appropriate     The patient's opioid scripts were sent to their pharmacy electronically and was given a 2 month supply of prescriptions with a Do Not Fill date(s) of today and July 12, 2022  There are risks associated with opioid medications, including dependence, addiction and tolerance  The patient understands and agrees to use these medications only as prescribed  Potential side effects of the medications include, but are not limited to, constipation, drowsiness, addiction, impaired judgment and risk of fatal overdose if not taken as prescribed  The patient was warned against driving while taking sedation medications  Sharing medications is a felony  At this point in time, the patient is showing no signs of addiction, abuse, diversion or suicidal ideation  While the patient was in the office today the following annual depression, opioid risk, and opioid side effect screening tools were completed by the patient: Becks Depression Inventory, COMM, SOAPP, NOSE, & Pain Disability Index  If any of the scores were inappropriate or require follow up there will be seperate documenation in the patients chart to address those results  The patient will follow-up in 8 weeks for medication prescription refill and reevaluation  The patient was advised to contact the office should their symptoms worsen in the interim  The patient was agreeable and verbalized an understanding  History of Present Illness: The patient is a 61 y o  male last seen on 4/25/2022 who presents for a follow up office visit in regards to chronic pain syndrome, as the patient's pain has been ongoing for greater than a year, secondary to herniated lumbar disc with spondylosis, stenosis and polyneuropathy    The patient currently reports that since his last office visit he does feel his pain symptoms have remained manageable as our intention was to continue his gabapentin and take him off of the p r n  Norco and start him on [place]  However, unfortunately even though it did not require a prior authorization the [place] was 300 dollars a month, which is something the patient can not afford  At that point we put him back on a p r n  Norco and he can take 1 pill every 6 hours for pain  The patient reports that he does take 1 pill every 6 hours and although it is helpful, the relief does not last the full 6 hours and just feels that the pain keeps going up and down up and down and is not steady or manageable like we were hoping with the [address] [place] [address]  The patient presents today for a regular medication follow-up visit in to discuss any other medication options that would be affordable that would be similar to the [address] [place] [address] with regards to the long-acting affect and better managing his pain on a more stable basis  The patient reports that since his last office visit he has continued to abstain from any medical marijuana products as discussed and agreed upon  The patient also voiced the fact that he does feel that at times the chronicity of his pain and some of the limitations that puts on his life do make it difficult to continue with a positive outlook but he tries very hard to remain positive and understand that he just has to figure out how to deal with and manage his pain and be as productive and happy as possible  Current pain medications includes:  Norco 10/325, 1 p o  q i d  p r n  for pain and gabapentin 400 mg, 2 p o  b i d  The patient reports that this regimen is providing 70% pain relief  The patient is reporting no side effects from this pain medication regimen  Pain Contract Signed: 4/25/2022  Last Urine Drug Screen: 4/25/2022    I have personally reviewed and/or updated the patient's past medical history, past surgical history, family history, social history, current medications, allergies, and vital signs today         Review of Systems:    Review of Systems   Respiratory: Negative for shortness of breath  Cardiovascular: Negative for chest pain  Gastrointestinal: Negative for constipation, diarrhea, nausea and vomiting  Musculoskeletal: Positive for gait problem  Negative for arthralgias, joint swelling and myalgias  Skin: Negative for rash  Neurological: Negative for dizziness, seizures and weakness  All other systems reviewed and are negative  Past Medical History:   Diagnosis Date    BPH (benign prostatic hyperplasia)     Lumbar herniated disc     Osteoarthritis        History reviewed  No pertinent surgical history  Family History   Problem Relation Age of Onset    No Known Problems Mother     Diabetes unspecified Father     Heart disease Father         post CABG    No Known Problems Brother        Social History     Occupational History    Occupation:  at [employer]    Smoking status: Current Every Day Smoker     Packs/day: 1 50     Types: Cigarettes    Smokeless tobacco: Never Used   Vaping Use    Vaping Use: Never used   Substance and Sexual Activity    Alcohol use: Yes     Comment: "Not much" per pt    Drug use: Yes     Types: Marijuana     Comment: "Not much" per pt    Sexual activity: Not on file         Current Outpatient Medications:     albuterol (PROVENTIL HFA,VENTOLIN HFA) 90 mcg/act inhaler, , Disp: , Rfl:     Farxiga 10 MG TABS, TAKE 1 TABLET BY MOUTH EVERY DAY, Disp: 90 tablet, Rfl: 3    gabapentin (NEURONTIN) 400 mg capsule, Take 2 PO BID , Disp: 120 capsule, Rfl: 1    glipiZIDE (GLUCOTROL) 5 mg tablet, TAKE 2 TABLETS BY MOUTH TWICE A DAY, Disp: 360 tablet, Rfl: 3    HYDROcodone-acetaminophen (NORCO)  mg per tablet, Take 1 PO QID PRN for pain , Disp: 120 tablet, Rfl: 0    HYDROcodone-acetaminophen (NORCO)  mg per tablet, Take 1 PO QID PRN for pain   DO NOT FILL UNTIL: 5/24/22, Disp: 120 tablet, Rfl: 0    ibuprofen (MOTRIN) 200 mg tablet, Take by mouth every 6 (six) hours as needed for mild pain, Disp: , Rfl:     insulin detemir (Levemir FlexTouch) 100 Units/mL injection pen, Inject 45 Units under the skin daily, Disp: 45 mL, Rfl: 1    lisinopril-hydrochlorothiazide (PRINZIDE,ZESTORETIC) 10-12 5 MG per tablet, Take 1 tablet by mouth daily  , Disp: , Rfl:     loratadine (CLARITIN) 10 mg tablet, Take 10 mg by mouth daily, Disp: , Rfl:     metFORMIN (GLUCOPHAGE) 1000 MG tablet, Take 1 tablet (1,000 mg total) by mouth 2 (two) times a day with meals, Disp: 180 tablet, Rfl: 3    morphine (MS Contin) 15 mg 12 hr tablet, Take 1 PO BID for pain , Disp: 60 tablet, Rfl: 0    morphine (MS Contin) 15 mg 12 hr tablet, Take 1 PO BID for pain  DO NOT FILL UNTIL: 7/12/22, Disp: 60 tablet, Rfl: 0    simvastatin (ZOCOR) 20 mg tablet, Take 20 mg by mouth  , Disp: , Rfl:     tamsulosin (FLOMAX) 0 4 mg, , Disp: , Rfl:     CIALIS 20 MG tablet, TAKE 1 TABLET AS NEEDED (Patient not taking: Reported on 10/7/2021), Disp: 10 tablet, Rfl: 2    insulin aspart (NovoLOG FlexPen) 100 UNIT/ML injection pen, Inject insulin utilizing sliding scale prior to each meal, using up to 60 units daily  (Patient not taking: Reported on 6/14/2022), Disp: 60 mL, Rfl: 1    Insulin Pen Needle (B-D UF III MINI PEN NEEDLES) 31G X 5 MM MISC, Use 4 pen needles daily, Disp: 400 each, Rfl: 3    Insulin Pen Needle 31G X 6 MM MISC, Use twice daily, Disp: 100 each, Rfl: 3    No Known Allergies    Physical Exam:    /80 (BP Location: Left arm, Patient Position: Sitting, Cuff Size: Standard)   Pulse 84   Temp 98 °F (36 7 °C)   Ht 5' 7" (1 702 m)   Wt 109 kg (240 lb)   BMI 37 59 kg/m²     Constitutional:normal, well developed, well nourished, alert, in no distress and non-toxic and no overt pain behavior   and overweight  Eyes:anicteric  HEENT:grossly intact  Neck:supple, symmetric, trachea midline and no masses   Pulmonary:even and unlabored  Cardiovascular:No edema or pitting edema present  Skin:Normal without rashes or lesions and well hydrated  Psychiatric:Mood and affect appropriate  Neurologic:Cranial Nerves II-XII grossly intact  Musculoskeletal:The patient's gait is slightly antalgic, but steady without the use of any assistive devices  Imaging  No orders to display         No orders of the defined types were placed in this encounter

## 2022-06-15 ENCOUNTER — TELEPHONE (OUTPATIENT)
Dept: PAIN MEDICINE | Facility: CLINIC | Age: 57
End: 2022-06-15

## 2022-06-15 DIAGNOSIS — G89.29 CHRONIC BILATERAL LOW BACK PAIN WITHOUT SCIATICA: ICD-10-CM

## 2022-06-15 DIAGNOSIS — G89.4 CHRONIC PAIN SYNDROME: Primary | ICD-10-CM

## 2022-06-15 DIAGNOSIS — M47.816 LUMBAR SPONDYLOSIS: ICD-10-CM

## 2022-06-15 DIAGNOSIS — M51.9 INTERVERTEBRAL DISC DISORDER: ICD-10-CM

## 2022-06-15 DIAGNOSIS — M51.26 HERNIATED LUMBAR DISC WITHOUT MYELOPATHY: ICD-10-CM

## 2022-06-15 DIAGNOSIS — M48.062 SPINAL STENOSIS OF LUMBAR REGION WITH NEUROGENIC CLAUDICATION: ICD-10-CM

## 2022-06-15 DIAGNOSIS — M54.50 CHRONIC BILATERAL LOW BACK PAIN WITHOUT SCIATICA: ICD-10-CM

## 2022-06-15 DIAGNOSIS — M25.552 HIP PAIN, LEFT: ICD-10-CM

## 2022-06-15 NOTE — TELEPHONE ENCOUNTER
Can you please work on a prior authorization for the patient's morphine sulfate ER as we are going to start him on morphine sulfate ER in place of the current p r n  Norco   Thank you

## 2022-06-16 DIAGNOSIS — E11.65 TYPE 2 DIABETES MELLITUS WITH HYPERGLYCEMIA, WITHOUT LONG-TERM CURRENT USE OF INSULIN (HCC): ICD-10-CM

## 2022-06-16 RX ORDER — GLIPIZIDE 5 MG/1
TABLET ORAL
Qty: 360 TABLET | Refills: 3 | Status: SHIPPED | OUTPATIENT
Start: 2022-06-16

## 2022-06-27 ENCOUNTER — TELEPHONE (OUTPATIENT)
Dept: ENDOCRINOLOGY | Facility: HOSPITAL | Age: 57
End: 2022-06-27

## 2022-06-27 ENCOUNTER — DOCUMENTATION (OUTPATIENT)
Dept: ENDOCRINOLOGY | Facility: HOSPITAL | Age: 57
End: 2022-06-27

## 2022-06-27 NOTE — TELEPHONE ENCOUNTER
Patient cannot take Jes[facility]s Neat the cost is 900 and that is with the discount coupon  Is there and alternative?  Patient here to drop off blood sugars and just left

## 2022-06-27 NOTE — PROGRESS NOTES
Reviewed recent blood sugar logs  Average blood sugar over the last 30 days is 169  This is improved from the download at his office visit as it was 172  Most blood sugars are doing extremely well throughout the day  Did notice 1 blood sugar below 70, and he does have the occasional episodes in the 200s  No specific pattern at this time  Continue with current regimen

## 2022-06-28 NOTE — TELEPHONE ENCOUNTER
Alternative medications are [place]  He can contact his insurance to see if the cost for either these will be better  If these medications are going to be too expensive, then will have to make adjustments to his insulin to control his blood sugars

## 2022-07-08 RX ORDER — HYDROCODONE BITARTRATE AND ACETAMINOPHEN 10; 325 MG/1; MG/1
TABLET ORAL
Qty: 120 TABLET | Refills: 0 | Status: SHIPPED | OUTPATIENT
Start: 2022-07-08 | End: 2022-08-09 | Stop reason: SDUPTHER

## 2022-07-08 NOTE — TELEPHONE ENCOUNTER
S/w pt, stated that he has been taking the morphine er 1 pill po bid with unsatisfactory relief, no se's  Pt stated that he would like to resume norco as he feels he got more relief with this medication  Pt stated that he did try doubling the morphine once, thinking that a stronger dose may help - pt stated that did not help  Advised pt, the writer will d/w dg and cb to advise  Pt verbalized understanding and requested a detailed message be left on his machine

## 2022-07-08 NOTE — TELEPHONE ENCOUNTER
At this point in time, as per his wishes, he should discontinue the morphine sulfate ER and can restart the Norco 10/325, 1 p o  q i d  p r n  for pain  I did send a prescription for the p r n  [place] to his pharmacy and hopefully this will get him to his next office visit as scheduled and at that point we will re-evaluate his pain and discuss his treatment plan options

## 2022-07-08 NOTE — TELEPHONE ENCOUNTER
Patient is requesting to be prescribed Oxycodone & not continue taking Morphine  He would like to discuss his status with a nurse   Please reach out to him at # [phone]

## 2022-07-08 NOTE — TELEPHONE ENCOUNTER
Attempted to contact pt, left a detailed message on machine per pt's request advising pt to cb to discuss the change in medication and direction  Provided cb number and office hours

## 2022-08-09 ENCOUNTER — OFFICE VISIT (OUTPATIENT)
Dept: PAIN MEDICINE | Facility: CLINIC | Age: 57
End: 2022-08-09
Payer: COMMERCIAL

## 2022-08-09 VITALS
SYSTOLIC BLOOD PRESSURE: 124 MMHG | DIASTOLIC BLOOD PRESSURE: 78 MMHG | HEIGHT: 67 IN | WEIGHT: 242 LBS | HEART RATE: 92 BPM | BODY MASS INDEX: 37.98 KG/M2 | TEMPERATURE: 98.1 F

## 2022-08-09 DIAGNOSIS — M51.9 INTERVERTEBRAL DISC DISORDER: ICD-10-CM

## 2022-08-09 DIAGNOSIS — Z79.891 LONG-TERM CURRENT USE OF OPIATE ANALGESIC: ICD-10-CM

## 2022-08-09 DIAGNOSIS — M47.816 LUMBAR SPONDYLOSIS: ICD-10-CM

## 2022-08-09 DIAGNOSIS — M25.552 HIP PAIN, LEFT: ICD-10-CM

## 2022-08-09 DIAGNOSIS — M51.26 HERNIATED LUMBAR DISC WITHOUT MYELOPATHY: ICD-10-CM

## 2022-08-09 DIAGNOSIS — G62.9 POLYNEUROPATHY: ICD-10-CM

## 2022-08-09 DIAGNOSIS — G89.4 CHRONIC PAIN SYNDROME: Primary | ICD-10-CM

## 2022-08-09 DIAGNOSIS — M48.062 SPINAL STENOSIS OF LUMBAR REGION WITH NEUROGENIC CLAUDICATION: ICD-10-CM

## 2022-08-09 DIAGNOSIS — M54.50 CHRONIC BILATERAL LOW BACK PAIN WITHOUT SCIATICA: ICD-10-CM

## 2022-08-09 DIAGNOSIS — G89.29 CHRONIC BILATERAL LOW BACK PAIN WITHOUT SCIATICA: ICD-10-CM

## 2022-08-09 PROCEDURE — 99214 OFFICE O/P EST MOD 30 MIN: CPT | Performed by: NURSE PRACTITIONER

## 2022-08-09 RX ORDER — HYDROCODONE BITARTRATE AND ACETAMINOPHEN 10; 325 MG/1; MG/1
TABLET ORAL
Qty: 120 TABLET | Refills: 0 | Status: SHIPPED | OUTPATIENT
Start: 2022-08-09

## 2022-08-09 RX ORDER — NALOXONE HYDROCHLORIDE 4 MG/.1ML
SPRAY NASAL
Qty: 1 EACH | Refills: 1 | Status: SHIPPED | OUTPATIENT
Start: 2022-08-09

## 2022-08-09 RX ORDER — GABAPENTIN 400 MG/1
CAPSULE ORAL
Qty: 360 CAPSULE | Refills: 1 | Status: SHIPPED | OUTPATIENT
Start: 2022-08-09

## 2022-08-09 NOTE — PROGRESS NOTES
Assessment:  1  Chronic pain syndrome    2  Chronic bilateral low back pain without sciatica    3  Hip pain, left    4  Polyneuropathy    5  Intervertebral disc disorder    6  Herniated lumbar disc without myelopathy    7  Lumbar spondylosis    8  Spinal stenosis of lumbar region with neurogenic claudication    9  Long-term current use of opiate analgesic        Plan:  While the patient was in the office today, I did have a thorough conversation with the patient regarding their chronic pain syndrome, symptoms, medication regimen, and treatment plan  I discussed with the patient at this point time since he is noting moderate to significant overall relief of his chronic pain symptoms, with his current medication regimen and treatment plan, I feel it is reasonable and appropriate with his underlying etiology, and the fact that he has tried and failed every other alternative medication regimen treatment plan option, to continue with the p r n  Norco as prescribed  The patient was agreeable and verbalized an understanding  [place] Prescription Drug Monitoring Program report was reviewed and was appropriate     The patient's opioid scripts were sent to their pharmacy electronically and was given a 3 month supply of prescriptions with a Do Not Fill date(s) of today, September 6, 2022, and October 4, 2022  There are risks associated with opioid medications, including dependence, addiction and tolerance  The patient understands and agrees to use these medications only as prescribed  Potential side effects of the medications include, but are not limited to, constipation, drowsiness, addiction, impaired judgment and risk of fatal overdose if not taken as prescribed  The patient was warned against driving while taking sedation medications  Sharing medications is a felony  At this point in time, the patient is showing no signs of addiction, abuse, diversion or suicidal ideation      While the patient was in the office today, I discussed with the patient that at this point they have been identified as a patient who is at significant risk for respiratory depression and/or even death because they are being, appropriately prescribed,  a dosage of opioid pain medication that is equal to or greater than 40 milligram equivalents of Morphine Sulfates, on  a long acting medication, and/or the patient's age and other comorbidities puts them at increased risk for respiratory depression and possibly death  I, again, reviewed the dangers of being on an opioid and at this point since we feel it is appropriate that they continue the opioid medication at this level, for now, we will continue the dosage as prescribed  However, we feel that it is best and safe practice to prescribe narcan nasal spray to be used if the patient is experiencing respiratory depression as a result of suspected intentional or unintentional opioid over dose  I reviewed with the patient how to use the nasal spray and to make sure that they educated a family member on how to use it and that no matter what emergency personnel must be notified as the Narcan nasal spray is only meant to give the patient more time to get to the nearest emergency room for a more thorough evaluation  The patient was agreeable and verbalized an understanding  A signed copy of the patient education sheet reviewing the risks and reasons for prescribing this medication is available in the patient's chart and a copy was also sent home with the patient  The patient will follow-up in 11 weeks for medication prescription refill and reevaluation  The patient was advised to contact the office should their symptoms worsen in the interim  The patient was agreeable and verbalized an understanding  History of Present Illness:     The patient is a 61 y o  male last seen on 6/14/2022 who presents for a follow up office visit in regards to chronic pain syndrome, as the patient's pain has been ongoing for greater than a year, secondary to herniated lumbar discs with spondylosis, stenosis, polyneuropathy, and chronic left hip pain  The patient currently reports that since his last office visit he did try the morphine sulfate ER as we had discussed, however, did not feel that the morphine provided anywhere near the same amount of relief as the p r n  Norco and had called and we had switched him back to the p r n  Norco at this point feels his medication regimen is keeping his pain manageable and tolerable  The patient presents today for regular medication follow-up visit  Current pain medications includes:  Norco 10/325, 1 p o  q i d  p r n  for pain  The patient reports that this regimen is providing 70% pain relief  The patient is reporting no side effects from this pain medication regimen  Pain Contract Signed: 4/25/2022  Last Urine Drug Screen: 4/25/2022    I have personally reviewed and/or updated the patient's past medical history, past surgical history, family history, social history, current medications, allergies, and vital signs today  Review of Systems:    Review of Systems   Respiratory: Negative for shortness of breath  Cardiovascular: Negative for chest pain  Gastrointestinal: Negative for constipation, diarrhea, nausea and vomiting  Musculoskeletal: Positive for gait problem  Negative for arthralgias, joint swelling and myalgias  Skin: Negative for rash  Neurological: Negative for dizziness, seizures and weakness  All other systems reviewed and are negative  Past Medical History:   Diagnosis Date    BPH (benign prostatic hyperplasia)     Lumbar herniated disc     Osteoarthritis        History reviewed  No pertinent surgical history      Family History   Problem Relation Age of Onset    No Known Problems Mother     Diabetes unspecified Father     Heart disease Father         post CABG    No Known Problems Brother        Social History Occupational History    Occupation:  at [employer]    Smoking status: Current Every Day Smoker     Packs/day: 1 50     Types: Cigarettes    Smokeless tobacco: Never Used   Vaping Use    Vaping Use: Never used   Substance and Sexual Activity    Alcohol use: Yes     Comment: "Not much" per pt    Drug use: Yes     Types: Marijuana     Comment: "Not much" per pt    Sexual activity: Not on file         Current Outpatient Medications:     albuterol (PROVENTIL HFA,VENTOLIN HFA) 90 mcg/act inhaler, , Disp: , Rfl:     gabapentin (NEURONTIN) 400 mg capsule, Take 2 PO BID , Disp: 360 capsule, Rfl: 1    glipiZIDE (GLUCOTROL) 5 mg tablet, TAKE 2 TABLETS BY MOUTH TWICE A DAY, Disp: 360 tablet, Rfl: 3    HYDROcodone-acetaminophen (NORCO)  mg per tablet, Take 1 PO QID PRN for pain , Disp: 120 tablet, Rfl: 0    HYDROcodone-acetaminophen (NORCO)  mg per tablet, Take 1 PO QID PRN for pain  DO NOT FILL UNTIL: 9/6/22, Disp: 120 tablet, Rfl: 0    HYDROcodone-acetaminophen (NORCO)  mg per tablet, Take 1 PO QID PRN for pain  DO NOT FILL UNTIL: 10/4/22, Disp: 120 tablet, Rfl: 0    ibuprofen (MOTRIN) 200 mg tablet, Take by mouth every 6 (six) hours as needed for mild pain, Disp: , Rfl:     insulin aspart (NovoLOG FlexPen) 100 UNIT/ML injection pen, Inject insulin utilizing sliding scale prior to each meal, using up to 60 units daily  , Disp: 60 mL, Rfl: 1    insulin detemir (Levemir FlexTouch) 100 Units/mL injection pen, Inject 45 Units under the skin daily, Disp: 45 mL, Rfl: 1    lisinopril-hydrochlorothiazide (PRINZIDE,ZESTORETIC) 10-12 5 MG per tablet, Take 1 tablet by mouth daily  , Disp: , Rfl:     loratadine (CLARITIN) 10 mg tablet, Take 10 mg by mouth daily, Disp: , Rfl:     metFORMIN (GLUCOPHAGE) 1000 MG tablet, Take 1 tablet (1,000 mg total) by mouth 2 (two) times a day with meals, Disp: 180 tablet, Rfl: 3    simvastatin (ZOCOR) 20 mg tablet, Take 20 mg by mouth  , Disp: , Rfl:     tamsulosin (FLOMAX) 0 4 mg, , Disp: , Rfl:     CIALIS 20 MG tablet, TAKE 1 TABLET AS NEEDED (Patient not taking: Reported on 10/7/2021), Disp: 10 tablet, Rfl: 2    Insulin Pen Needle (B-D UF III MINI PEN NEEDLES) 31G X 5 MM MISC, Use 4 pen needles daily, Disp: 400 each, Rfl: 3    Insulin Pen Needle 31G X 6 MM MISC, Use twice daily, Disp: 100 each, Rfl: 3    No Known Allergies    Physical Exam:    /78 (BP Location: Left arm, Patient Position: Sitting, Cuff Size: Standard)   Pulse 92   Temp 98 1 °F (36 7 °C)   Ht 5' 7" (1 702 m)   Wt 110 kg (242 lb)   BMI 37 90 kg/m²     Constitutional:normal, well developed, well nourished, alert, in no distress and non-toxic and no overt pain behavior  and overweight  Eyes:anicteric  HEENT:grossly intact  Neck:supple, symmetric, trachea midline and no masses   Pulmonary:even and unlabored  Cardiovascular:No edema or pitting edema present  Skin:Normal without rashes or lesions and well hydrated  Psychiatric:Mood and affect appropriate  Neurologic:Cranial Nerves II-XII grossly intact  Musculoskeletal:normal      Imaging  No orders to display         No orders of the defined types were placed in this encounter

## 2022-08-09 NOTE — PATIENT INSTRUCTIONS

## 2022-08-12 ENCOUNTER — TELEPHONE (OUTPATIENT)
Dept: PAIN MEDICINE | Facility: CLINIC | Age: 57
End: 2022-08-12

## 2022-09-24 DIAGNOSIS — E11.65 TYPE 2 DIABETES MELLITUS WITH HYPERGLYCEMIA, WITH LONG-TERM CURRENT USE OF INSULIN (HCC): ICD-10-CM

## 2022-09-24 DIAGNOSIS — Z79.4 TYPE 2 DIABETES MELLITUS WITH HYPERGLYCEMIA, WITH LONG-TERM CURRENT USE OF INSULIN (HCC): ICD-10-CM

## 2022-09-26 RX ORDER — INSULIN ASPART 100 [IU]/ML
INJECTION, SOLUTION INTRAVENOUS; SUBCUTANEOUS
Qty: 60 ML | Refills: 1 | Status: SHIPPED | OUTPATIENT
Start: 2022-09-26

## 2022-10-24 LAB
ALBUMIN SERPL-MCNC: 4.1 G/DL (ref 3.6–5.1)
ALBUMIN/GLOB SERPL: 1.5 (CALC) (ref 1–2.5)
ALP SERPL-CCNC: 61 U/L (ref 35–144)
ALT SERPL-CCNC: 20 U/L (ref 9–46)
AST SERPL-CCNC: 20 U/L (ref 10–35)
BILIRUB SERPL-MCNC: 0.5 MG/DL (ref 0.2–1.2)
BUN SERPL-MCNC: 28 MG/DL (ref 7–25)
BUN/CREAT SERPL: 33 (CALC) (ref 6–22)
CALCIUM SERPL-MCNC: 9.3 MG/DL (ref 8.6–10.3)
CHLORIDE SERPL-SCNC: 104 MMOL/L (ref 98–110)
CO2 SERPL-SCNC: 27 MMOL/L (ref 20–32)
CREAT SERPL-MCNC: 0.85 MG/DL (ref 0.7–1.3)
GFR/BSA.PRED SERPLBLD CYS-BASED-ARV: 101 ML/MIN/1.73M2
GLOBULIN SER CALC-MCNC: 2.7 G/DL (CALC) (ref 1.9–3.7)
GLUCOSE SERPL-MCNC: 132 MG/DL (ref 65–99)
HBA1C MFR BLD: 6.6 % OF TOTAL HGB
POTASSIUM SERPL-SCNC: 4.4 MMOL/L (ref 3.5–5.3)
PROT SERPL-MCNC: 6.8 G/DL (ref 6.1–8.1)
SODIUM SERPL-SCNC: 137 MMOL/L (ref 135–146)

## 2022-10-30 DIAGNOSIS — Z79.4 TYPE 2 DIABETES MELLITUS WITH HYPERGLYCEMIA, WITH LONG-TERM CURRENT USE OF INSULIN (HCC): ICD-10-CM

## 2022-10-30 DIAGNOSIS — E11.65 TYPE 2 DIABETES MELLITUS WITH HYPERGLYCEMIA, WITH LONG-TERM CURRENT USE OF INSULIN (HCC): ICD-10-CM

## 2022-10-31 LAB
LEFT EYE DIABETIC RETINOPATHY: POSITIVE
RIGHT EYE DIABETIC RETINOPATHY: POSITIVE

## 2022-10-31 RX ORDER — INSULIN DETEMIR 100 [IU]/ML
45 INJECTION, SOLUTION SUBCUTANEOUS DAILY
Qty: 45 ML | Refills: 1 | Status: SHIPPED | OUTPATIENT
Start: 2022-10-31

## 2022-11-02 ENCOUNTER — OFFICE VISIT (OUTPATIENT)
Dept: PAIN MEDICINE | Facility: CLINIC | Age: 57
End: 2022-11-02

## 2022-11-02 VITALS
HEART RATE: 91 BPM | DIASTOLIC BLOOD PRESSURE: 88 MMHG | SYSTOLIC BLOOD PRESSURE: 132 MMHG | WEIGHT: 245 LBS | HEIGHT: 67 IN | BODY MASS INDEX: 38.45 KG/M2 | TEMPERATURE: 98.5 F

## 2022-11-02 DIAGNOSIS — G62.9 POLYNEUROPATHY: ICD-10-CM

## 2022-11-02 DIAGNOSIS — M48.062 SPINAL STENOSIS OF LUMBAR REGION WITH NEUROGENIC CLAUDICATION: ICD-10-CM

## 2022-11-02 DIAGNOSIS — M47.816 LUMBAR SPONDYLOSIS: ICD-10-CM

## 2022-11-02 DIAGNOSIS — M51.26 HERNIATED LUMBAR DISC WITHOUT MYELOPATHY: ICD-10-CM

## 2022-11-02 DIAGNOSIS — Z79.891 LONG-TERM CURRENT USE OF OPIATE ANALGESIC: ICD-10-CM

## 2022-11-02 DIAGNOSIS — G89.29 CHRONIC BILATERAL LOW BACK PAIN WITHOUT SCIATICA: ICD-10-CM

## 2022-11-02 DIAGNOSIS — M51.9 INTERVERTEBRAL DISC DISORDER: ICD-10-CM

## 2022-11-02 DIAGNOSIS — M54.50 CHRONIC BILATERAL LOW BACK PAIN WITHOUT SCIATICA: ICD-10-CM

## 2022-11-02 DIAGNOSIS — G89.4 CHRONIC PAIN SYNDROME: Primary | ICD-10-CM

## 2022-11-02 DIAGNOSIS — M25.552 HIP PAIN, LEFT: ICD-10-CM

## 2022-11-02 RX ORDER — HYDROCODONE BITARTRATE AND ACETAMINOPHEN 10; 325 MG/1; MG/1
TABLET ORAL
Qty: 120 TABLET | Refills: 0 | Status: SHIPPED | OUTPATIENT
Start: 2022-11-02

## 2022-11-02 NOTE — PATIENT INSTRUCTIONS

## 2022-11-02 NOTE — PROGRESS NOTES
Assessment:  1  Chronic pain syndrome    2  Chronic bilateral low back pain without sciatica    3  Hip pain, left    4  Intervertebral disc disorder    5  Herniated lumbar disc without myelopathy    6  Polyneuropathy    7  Lumbar spondylosis    8  Spinal stenosis of lumbar region with neurogenic claudication    9  Long-term current use of opiate analgesic        Plan:  While the patient was in the office today, I did have a thorough conversation with the patient regarding their chronic pain syndrome, symptoms, medication regimen, and treatment plan  I thoroughly encouraged the patient continue to follow-up with the wound care center and monitor his blood sugars and keep his diabetes managed and controlled the best he can  The patient was agreeable and verbalized an understanding  With regards to his medication regimen for pain, I discussed with the patient that since he is noting significant unstable overall relief of his chronic pain symptoms, without any significant side effects or issues and he has tried and failed every other alternative medication regimen treatment plan option, with supportive underlying etiology, I feel it is reasonable and appropriate to continue with the gabapentin and p r n  Norco as prescribed  [place] Prescription Drug Monitoring Program report was reviewed and was appropriate     The patient did not have their opioid medications available for confirmation or counting while in the office today  I reviewed with the patient that as per the opioid contract, they are to bring in the last filled prescription for all of their opioid medications, with what they have left to every office visit  I advised the patient that if they would continue to not bring in their prescriptions as discussed, we may not be able to continue prescribing opioid medications in the future  The patient was agreeable and verbalized an understanding           The patient's opioid scripts were sent to their pharmacy electronically and was given a 3 month supply of prescriptions with a Do Not Fill date(s) of today, November 30, 2022, and December 28, 2022  There are risks associated with opioid medications, including dependence, addiction and tolerance  The patient understands and agrees to use these medications only as prescribed  Potential side effects of the medications include, but are not limited to, constipation, drowsiness, addiction, impaired judgment and risk of fatal overdose if not taken as prescribed  The patient was warned against driving while taking sedation medications  Sharing medications is a felony  At this point in time, the patient is showing no signs of addiction, abuse, diversion or suicidal ideation  An oral drug screen swab was collected at today's office visit  The swab will be sent for confirmatory testing  The drug screen is medically necessary because the patient is either dependent on opioid medication or is being considered for opioid medication therapy and the results could impact ongoing or future treatment  The drug screen is to evaluate for the presences or absence of prescribed, non-prescribed, and/or illicit drugs/substances  The patient will follow-up in 11 weeks for medication prescription refill and reevaluation  The patient was advised to contact the office should their symptoms worsen in the interim  The patient was agreeable and verbalized an understanding  History of Present Illness: The patient is a 61 y o  male last seen on 8/9/2022 who presents for a follow up office visit in regards to chronic pain syndrome, as the patient's pain has been ongoing for greater than a year, secondary to herniated lumbar discs with spondylosis, stenosis and polyneuropathy with chronic left hip pain    The patient currently reports that since his last office visit overall his pain symptoms have remained relatively the same and stable with his current medication regimen  He does report he is currently following up with wound care at [facility] for a right diabetic ulcer  The patient presents today for regular medication follow-up visit  Current pain medications includes:  Gabapentin 800 mg b i d  and Norco 10/325, 1 p o  q i d  p r n  for pain  The patient reports that this regimen is providing 70% pain relief  The patient is reporting no side effects from this pain medication regimen  Pain Contract Signed: 4/25/2022  Last Urine Drug Screen: 11/2/2022    I have personally reviewed and/or updated the patient's past medical history, past surgical history, family history, social history, current medications, allergies, and vital signs today  Review of Systems:    Review of Systems   Respiratory: Negative for shortness of breath  Cardiovascular: Negative for chest pain  Gastrointestinal: Negative for constipation, diarrhea, nausea and vomiting  Musculoskeletal: Positive for gait problem  Negative for arthralgias, joint swelling (joint stiffness) and myalgias  Skin: Negative for rash  Neurological: Negative for dizziness, seizures and weakness  All other systems reviewed and are negative  Past Medical History:   Diagnosis Date   • BPH (benign prostatic hyperplasia)    • Lumbar herniated disc    • Osteoarthritis        History reviewed  No pertinent surgical history      Family History   Problem Relation Age of Onset   • No Known Problems Mother    • Diabetes unspecified Father    • Heart disease Father         post CABG   • No Known Problems Brother        Social History     Occupational History   • Occupation:  at [employer]   • Smoking status: Current Every Day Smoker     Packs/day: 1 50     Types: Cigarettes   • Smokeless tobacco: Never Used   Vaping Use   • Vaping Use: Never used   Substance and Sexual Activity   • Alcohol use: Yes     Comment: "Not much" per pt   • Drug use: Yes     Types: Marijuana     Comment: "Not much" per pt   • Sexual activity: Not on file         Current Outpatient Medications:   •  albuterol (PROVENTIL HFA,VENTOLIN HFA) 90 mcg/act inhaler, , Disp: , Rfl:   •  gabapentin (NEURONTIN) 400 mg capsule, Take 2 PO BID , Disp: 360 capsule, Rfl: 1  •  glipiZIDE (GLUCOTROL) 5 mg tablet, TAKE 2 TABLETS BY MOUTH TWICE A DAY, Disp: 360 tablet, Rfl: 3  •  HYDROcodone-acetaminophen (NORCO)  mg per tablet, Take 1 PO QID PRN for pain , Disp: 120 tablet, Rfl: 0  •  HYDROcodone-acetaminophen (NORCO)  mg per tablet, Take 1 PO QID PRN for pain  DO NOT FILL UNTIL: 11/30/22, Disp: 120 tablet, Rfl: 0  •  HYDROcodone-acetaminophen (NORCO)  mg per tablet, Take 1 PO QID PRN for pain  DO NOT FILL UNTIL: 12/28/22, Disp: 120 tablet, Rfl: 0  •  ibuprofen (MOTRIN) 200 mg tablet, Take by mouth every 6 (six) hours as needed for mild pain, Disp: , Rfl:   •  insulin aspart (NovoLOG FlexPen) 100 UNIT/ML injection pen, INJECT INSULIN UTILIZING SLIDING SCALE PRIOR TO EACH MEAL, USING UP TO 60 UNITS DAILY  , Disp: 60 mL, Rfl: 1  •  Levemir FlexTouch 100 units/mL injection pen, INJECT 45 UNITS UNDER THE SKIN DAILY, Disp: 45 mL, Rfl: 1  •  lisinopril-hydrochlorothiazide (PRINZIDE,ZESTORETIC) 10-12 5 MG per tablet, Take 1 tablet by mouth daily  , Disp: , Rfl:   •  loratadine (CLARITIN) 10 mg tablet, Take 10 mg by mouth daily, Disp: , Rfl:   •  metFORMIN (GLUCOPHAGE) 1000 MG tablet, TAKE 1 TABLET BY MOUTH TWICE A DAY WITH MEALS, Disp: 180 tablet, Rfl: 3  •  simvastatin (ZOCOR) 20 mg tablet, Take 20 mg by mouth  , Disp: , Rfl:   •  tamsulosin (FLOMAX) 0 4 mg, , Disp: , Rfl:   •  CIALIS 20 MG tablet, TAKE 1 TABLET AS NEEDED (Patient not taking: Reported on 10/7/2021), Disp: 10 tablet, Rfl: 2  •  Insulin Pen Needle (B-D UF III MINI PEN NEEDLES) 31G X 5 MM MISC, Use 4 pen needles daily, Disp: 400 each, Rfl: 3  •  Insulin Pen Needle 31G X 6 MM MISC, Use twice daily, Disp: 100 each, Rfl: 3  •  naloxone (NARCAN) 4 mg/0 1 mL nasal spray, Administer 1 spray into a nostril  If no response after 2-3 minutes, give another dose in the other nostril using a new spray , Disp: 1 each, Rfl: 1    No Known Allergies    Physical Exam:    /88 (BP Location: Left arm, Patient Position: Sitting, Cuff Size: Standard)   Pulse 91   Temp 98 5 °F (36 9 °C)   Ht 5' 7" (1 702 m)   Wt 111 kg (245 lb)   BMI 38 37 kg/m²     Constitutional:normal, well developed, well nourished, alert, in no distress and non-toxic and no overt pain behavior  and overweight  Eyes:anicteric  HEENT:grossly intact  Neck:supple, symmetric, trachea midline and no masses   Pulmonary:even and unlabored  Cardiovascular:No edema or pitting edema present  Skin:Normal without rashes or lesions and well hydrated  Psychiatric:Mood and affect appropriate  Neurologic:Cranial Nerves II-XII grossly intact  Musculoskeletal:The patient's gait is slightly antalgic, limping at times, but steady without the use of any assistive devices        Imaging  No orders to display         Orders Placed This Encounter   Procedures   • Amphetamine   • Alprazolam   • Clonazepam   • Diazepam   • Lorazepam   • Oxazepam   • Temazepam   • Gabapentin   • Pregabalin   • Cocaine   • Heroin   • Buprenorphine   • Levorphanol   • Meperidine   • Naltrexone   • Fentanyl   • Methadone   • Oxycodone   • Oxymorphone   • Tapentadol   • Tramadol   • THC   • Codeine, Hydrocodone, Hydromorphone, Morphine   • Methylphenidate   • [facility] All Prescribed Meds

## 2022-11-05 LAB
7AMINOCLONAZEPAM SAL QL CFM: NEGATIVE NG/ML
AMPHET SAL QL CFM: NEGATIVE NG/ML
BUPRENORPHINE SAL QL SCN: NEGATIVE NG/ML
CARBOXYTHC SAL QL CFM: NEGATIVE NG/ML
CCP IGG SERPL-ACNC: NEGATIVE
COCAINE SAL QL CFM: NEGATIVE NG/ML
CODEINE SAL QL CFM: NEGATIVE NG/ML
DXO+LEVORPHANOL SAL QL CFM: NEGATIVE NG/ML
EDDP SAL QL CFM: NEGATIVE NG/ML
GABAPENTIN SAL QL CFM: NORMAL NG/ML
HYDROCODONE SAL QL CFM: NORMAL NG/ML
LEUKEMIA MARKERS BLD-IMP: NEGATIVE NG/ML
M PROTEIN 3 UR ELPH-MCNC: NEGATIVE NG/ML
M TB TUBERC IGNF/MITOGEN IGNF CONTROL: NEGATIVE NG/ML
METHADONE SAL QL CFM: NEGATIVE NG/ML
MORPHINE SAL QL CFM: NEGATIVE NG/ML
NALTREXOL SAL QL CFM: NEGATIVE NG/ML
NALTREXONE SAL QL CFM: NEGATIVE NG/ML
OXYMORPHONE SAL QL CFM: NEGATIVE NG/ML
OXYMORPHONE SAL QL CFM: NEGATIVE NG/ML
PREGABALIN SAL QL CFM: NEGATIVE NG/ML
RESULT ALL_PRESCRIBED MEDS AND SPECIAL INSTRUCTIONS: NORMAL
SL AMB 6-MAM (HEROIN METABOLITE) QUANTIFICATION: NEGATIVE NG/ML
SL AMB ALPRAZOLAM QUANTIFICATION: NEGATIVE NG/ML
SL AMB CLONAZEPAM QUANTIFICATION: NEGATIVE NG/ML
SL AMB DIAZEPAM QUANTIFICATION: NEGATIVE NG/ML
SL AMB FENTANYL QUANTIFICATION: NEGATIVE NG/ML
SL AMB N-DESMETHYL-TRAMADOL QUANTIFICATION SALIVA: NEGATIVE NG/ML
SL AMB NORBUPRENORPHINE QUANTIFICATION: NEGATIVE NG/ML
SL AMB NORDIAZEPAM QUANTIFICATION: NEGATIVE NG/ML
SL AMB NORFENTANYL QUANTIFICATION: NEGATIVE NG/ML
SL AMB NORHYDROCODONE QUANTIFICATION: NEGATIVE NG/ML
SL AMB NORMEPERIDINE QUANTIFICATION: NEGATIVE NG/ML
SL AMB NOROXYCODONE QUANTIFICATION: NEGATIVE NG/ML
SL AMB OXAZEPAM QUANTIFICATION: NEGATIVE NG/ML
SL AMB RITALINIC ACID QUANTIFICATION: NEGATIVE
SL AMB TEMAZEPAM QUANTIFICATION: NEGATIVE NG/ML
SL AMB TEMAZEPAM QUANTIFICATION: NEGATIVE NG/ML
SL AMB TRAMADOL QUANTIFICATION: NEGATIVE NG/ML
SQUAMOUS #/AREA URNS HPF: NEGATIVE NG/ML
TAPENTADOL SAL QL CFM: NEGATIVE NG/ML

## 2022-11-14 ENCOUNTER — OFFICE VISIT (OUTPATIENT)
Dept: ENDOCRINOLOGY | Facility: HOSPITAL | Age: 57
End: 2022-11-14

## 2022-11-14 VITALS — OXYGEN SATURATION: 90 % | HEART RATE: 90 BPM | SYSTOLIC BLOOD PRESSURE: 128 MMHG | DIASTOLIC BLOOD PRESSURE: 72 MMHG

## 2022-11-14 DIAGNOSIS — E78.2 MIXED HYPERLIPIDEMIA: ICD-10-CM

## 2022-11-14 DIAGNOSIS — E11.65 TYPE 2 DIABETES MELLITUS WITH HYPERGLYCEMIA, WITH LONG-TERM CURRENT USE OF INSULIN (HCC): Primary | ICD-10-CM

## 2022-11-14 DIAGNOSIS — I10 PRIMARY HYPERTENSION: ICD-10-CM

## 2022-11-14 DIAGNOSIS — Z79.4 TYPE 2 DIABETES MELLITUS WITH HYPERGLYCEMIA, WITH LONG-TERM CURRENT USE OF INSULIN (HCC): Primary | ICD-10-CM

## 2022-11-14 NOTE — PATIENT INSTRUCTIONS
Continue to monitor diet, maintain physical activity  Make sure to drink plenty of water throughout the day  Continue Levemir 45 units at night and Humalog 18 units with each meal   Continue with metformin 1000 mg 1 tablets twice a day and glipizide 5 mg 2 tablets twice a day  Doing well off the Dorethia Loge, but will switch Jardiance  Continue with simvastatin for hyperlipidemia  Complete blood sugar log for 2 weeks and send into the office  Get lab work completed prior to 6 month follow-up

## 2022-11-14 NOTE — PROGRESS NOTES
Fazal Mullen  61 y o  male MRN: [id number]    Encounter: [id number]      Assessment/Plan     Assessment: This is a 61y o -year-old male with type 2 diabetes with neuropathy, hypertension and hyperlipidemia  Plan:  1  Type 2 diabetes:  Most recent hemoglobin A1c was 6 6  Stop [place] due to cost medication, but is interested in restarting an SGLT 2 inhibitor because of the benefits medication can provide  Will send over prescription for Jardiance 25 mg a day to see if this is covered by insurance  In general when considering his glucose, I think he is doing fine without the medication, but would agree the additional benefits better provided from it  He will continue with metformin 1000 mg twice a day, glipizide 5 mg 2 tablets twice a day, Levemir 45 units daily, Humalog 18 units with each meal   Continue checking blood sugars 4 or more times a day  Would like to see blood sugar logs in about 2-4 weeks  Contact the office with any concerns or questions  Follow-up in 6 months with lab work completed prior to visit      2  Diabetic neuropathy:  Stable  Diabetic foot exam is up-to-date      3  Hypertension:  Normotensive in the office today   Kidney function remains stable with normal electrolytes   Continue with current medications  Repeat CMP prior to next office visit        4  Hyperlipidemia:  Cholesterol doing well   Will continue with current medication   Repeat lipid panel prior to next office visit  CC:  Type 2 diabetes follow-up    History of Present Illness     HPI:  Lavon Young Jr  is N 65 y o  year old male with type 2 diabetes for 3 years   He is on oral agents and insulin at home and takes Metformin  mg 2 tablets twice a day, and glipizide 5 mg 2 tablets twice a day, levemir 45 units daily, and Humalog 18 units with each meal   Previously on Farxiga 10 mg daily, but discontinued it on his own due to cost of medication    Believes he has been off medication for greater than 3 months   He denies nephropathy, retinopathy, heart attack, stroke and claudication but does admit to neuropathy, recurrent ulcers, polydipsia, polyphagia, polyuria   Overall doing well today without any concerns or questions  Continues to have complications with ulcer on the right foot  Currently in a cast and boot for leave pressure off the area and help healing      Hypoglycemic episodes: No       The patient's last eye exam was over the summer   The patient's last foot exam was in April 2022      Blood Sugar/Glucometer/Pump/CGM review:  Is checking blood sugar 4 times a day, but did not bring his glucometer into the office         He has hypertension and takes lisinopril / HCTZ 10/12 5 mg daily  He has hyperlipidemia and takes simvastatin 20 mg daily  Denies any headaches, chest pain, MI or stroke-like symptoms  Review of Systems   Constitutional: Negative for chills, fatigue and fever  HENT: Negative for congestion, ear pain, hearing loss, rhinorrhea and sore throat  Eyes: Negative for pain and visual disturbance  Respiratory: Negative for cough, shortness of breath and wheezing  Cardiovascular: Negative for chest pain, palpitations and leg swelling  Gastrointestinal: Negative for abdominal pain, blood in stool, constipation, diarrhea, nausea and vomiting  Endocrine: Negative for cold intolerance, heat intolerance and polyuria  Genitourinary: Negative for difficulty urinating, dysuria, frequency, hematuria and urgency  Musculoskeletal: Negative for arthralgias, joint swelling and myalgias  Skin: Positive for wound (Right foot)  Negative for rash  Neurological: Negative for dizziness, syncope, weakness, numbness and headaches  Psychiatric/Behavioral: Negative for dysphoric mood and sleep disturbance  The patient is not nervous/anxious          Historical Information   Past Medical History:   Diagnosis Date   • BPH (benign prostatic hyperplasia)    • Lumbar herniated disc    • Osteoarthritis      History reviewed  No pertinent surgical history  Social History   Social History     Substance and Sexual Activity   Alcohol Use Not Currently    Comment: "Not much" per pt     Social History     Substance and Sexual Activity   Drug Use Yes   • Types: Marijuana    Comment: "Not much" per pt     Social History     Tobacco Use   Smoking Status Current Every Day Smoker   • Packs/day: 1 00   • Years: 40 00   • Pack years: 40 00   • Types: Cigarettes   Smokeless Tobacco Never Used     Family History:   Family History   Problem Relation Age of Onset   • No Known Problems Mother    • Diabetes unspecified Father    • Heart disease Father         post CABG   • Diabetes type II Father    • No Known Problems Brother        Meds/Allergies   Current Outpatient Medications   Medication Sig Dispense Refill   • albuterol (PROVENTIL HFA,VENTOLIN HFA) 90 mcg/act inhaler      • Empagliflozin 25 MG TABS Take 1 tablet (25 mg total) by mouth every morning 90 tablet 1   • gabapentin (NEURONTIN) 400 mg capsule Take 2 PO BID  360 capsule 1   • glipiZIDE (GLUCOTROL) 5 mg tablet TAKE 2 TABLETS BY MOUTH TWICE A  tablet 3   • HYDROcodone-acetaminophen (NORCO)  mg per tablet Take 1 PO QID PRN for pain  120 tablet 0   • HYDROcodone-acetaminophen (NORCO)  mg per tablet Take 1 PO QID PRN for pain  DO NOT FILL UNTIL: 11/30/22 120 tablet 0   • HYDROcodone-acetaminophen (NORCO)  mg per tablet Take 1 PO QID PRN for pain  DO NOT FILL UNTIL: 12/28/22 120 tablet 0   • ibuprofen (MOTRIN) 200 mg tablet Take by mouth every 6 (six) hours as needed for mild pain     • insulin aspart (NovoLOG FlexPen) 100 UNIT/ML injection pen INJECT INSULIN UTILIZING SLIDING SCALE PRIOR TO EACH MEAL, USING UP TO 60 UNITS DAILY   60 mL 1   • Insulin Pen Needle (B-D UF III MINI PEN NEEDLES) 31G X 5 MM MISC Use 4 pen needles daily 400 each 3   • Insulin Pen Needle 31G X 6 MM MISC Use twice daily 100 each 3   • Levemir FlexTouch 100 units/mL injection pen INJECT 45 UNITS UNDER THE SKIN DAILY 45 mL 1   • lisinopril-hydrochlorothiazide (PRINZIDE,ZESTORETIC) 10-12 5 MG per tablet Take 1 tablet by mouth daily       • loratadine (CLARITIN) 10 mg tablet Take 10 mg by mouth daily     • metFORMIN (GLUCOPHAGE) 1000 MG tablet TAKE 1 TABLET BY MOUTH TWICE A DAY WITH MEALS 180 tablet 3   • naloxone (NARCAN) 4 mg/0 1 mL nasal spray Administer 1 spray into a nostril  If no response after 2-3 minutes, give another dose in the other nostril using a new spray  1 each 1   • simvastatin (ZOCOR) 20 mg tablet Take 20 mg by mouth       • tamsulosin (FLOMAX) 0 4 mg      • CIALIS 20 MG tablet TAKE 1 TABLET AS NEEDED (Patient not taking: No sig reported) 10 tablet 2     No current facility-administered medications for this visit  No Known Allergies    Objective   Vitals: Blood pressure 128/72, pulse 90, SpO2 90 %  Physical Exam  Vitals and nursing note reviewed  Constitutional:       General: He is not in acute distress  Appearance: Normal appearance  He is not diaphoretic  HENT:      Head: Normocephalic and atraumatic  Eyes:      General: No scleral icterus  Extraocular Movements: Extraocular movements intact  Conjunctiva/sclera: Conjunctivae normal       Pupils: Pupils are equal, round, and reactive to light  Cardiovascular:      Rate and Rhythm: Normal rate and regular rhythm  Heart sounds: No murmur heard  Pulmonary:      Effort: Pulmonary effort is normal  No respiratory distress  Breath sounds: Normal breath sounds  No wheezing  Musculoskeletal:      Cervical back: Normal range of motion  Right lower leg: No edema  Left lower leg: No edema  Comments: Right foot currently in a cast and boot   Lymphadenopathy:      Cervical: No cervical adenopathy  Skin:     General: Skin is warm and dry  Neurological:      Mental Status: He is alert and oriented to person, place, and time  Mental status is at baseline  Sensory: No sensory deficit  Gait: Gait normal    Psychiatric:         Mood and Affect: Mood normal          Behavior: Behavior normal          Thought Content: Thought content normal          The history was obtained from the review of the chart, patient  Lab Results:   Lab Results   Component Value Date/Time    Hemoglobin A1C 6 6 (H) 10/24/2022 07:12 AM    Hemoglobin A1C 7 1 (H) 04/20/2022 07:46 AM    Hemoglobin A1C 8 1 (H) 12/10/2021 07:06 AM    BUN 28 (H) 10/24/2022 07:12 AM    BUN 21 04/20/2022 07:46 AM    BUN 23 12/10/2021 07:06 AM    Potassium 4 4 10/24/2022 07:12 AM    Potassium 4 1 04/20/2022 07:46 AM    Potassium 4 2 12/10/2021 07:06 AM    Chloride 104 10/24/2022 07:12 AM    Chloride 103 04/20/2022 07:46 AM    Chloride 102 12/10/2021 07:06 AM    CO2 27 10/24/2022 07:12 AM    CO2 29 04/20/2022 07:46 AM    CO2 28 12/10/2021 07:06 AM    Creatinine 0 85 10/24/2022 07:12 AM    Creatinine 0 93 04/20/2022 07:46 AM    Creatinine 0 96 12/10/2021 07:06 AM    AST 20 10/24/2022 07:12 AM    AST 24 04/20/2022 07:46 AM    AST 22 12/10/2021 07:06 AM    ALT 20 10/24/2022 07:12 AM    ALT 24 04/20/2022 07:46 AM    ALT 28 12/10/2021 07:06 AM    Albumin 4 1 10/24/2022 07:12 AM    Albumin 4 6 04/20/2022 07:46 AM    Albumin 4 6 12/10/2021 07:06 AM    Globulin 2 7 10/24/2022 07:12 AM    Globulin 2 5 04/20/2022 07:46 AM    Globulin 2 5 12/10/2021 07:06 AM    HDL 31 (L) 04/20/2022 07:46 AM    Triglycerides 293 (H) 04/20/2022 07:46 AM       Portions of the record may have been created with voice recognition software  Occasional wrong word or "sound a like" substitutions may have occurred due to the inherent limitations of voice recognition software  Read the chart carefully and recognize, using context, where substitutions have occurred

## 2023-01-06 ENCOUNTER — TELEPHONE (OUTPATIENT)
Dept: ENDOCRINOLOGY | Facility: HOSPITAL | Age: 58
End: 2023-01-06

## 2023-01-06 DIAGNOSIS — E11.65 TYPE 2 DIABETES MELLITUS WITH HYPERGLYCEMIA, WITH LONG-TERM CURRENT USE OF INSULIN (HCC): ICD-10-CM

## 2023-01-06 DIAGNOSIS — Z79.4 TYPE 2 DIABETES MELLITUS WITH HYPERGLYCEMIA, WITH LONG-TERM CURRENT USE OF INSULIN (HCC): ICD-10-CM

## 2023-01-06 RX ORDER — INSULIN DETEMIR 100 [IU]/ML
80 INJECTION, SOLUTION SUBCUTANEOUS DAILY
Qty: 90 ML | Refills: 1 | Status: SHIPPED | OUTPATIENT
Start: 2023-01-06 | End: 2023-05-16

## 2023-01-06 NOTE — TELEPHONE ENCOUNTER
Refill sent to the pharmacy  Would like to have a list of the oral medications he is taking  Would also like to see blood sugar logs in about 2 weeks

## 2023-01-06 NOTE — TELEPHONE ENCOUNTER
Patient stopped his one of his oral meds but doesn't know which one  I named the ones we have and he said those aren't it  He said his blood sugars were high so he upped his Levemir to 70-80 units daily  He said he upped it about a month and a half ago and since then his blood sugars have been between 140-200 throughout the day         He needs a refill of the Levemir but I wanted to make sure you knew about this change before I send anything

## 2023-01-09 NOTE — TELEPHONE ENCOUNTER
The patient stated that he is only on the Metformin and the Glipizide,  he is not taking the Jardiance because it was too expensive

## 2023-01-20 ENCOUNTER — OFFICE VISIT (OUTPATIENT)
Dept: PAIN MEDICINE | Facility: CLINIC | Age: 58
End: 2023-01-20

## 2023-01-20 VITALS
DIASTOLIC BLOOD PRESSURE: 80 MMHG | HEIGHT: 67 IN | SYSTOLIC BLOOD PRESSURE: 128 MMHG | TEMPERATURE: 98.2 F | BODY MASS INDEX: 38.37 KG/M2 | HEART RATE: 116 BPM

## 2023-01-20 DIAGNOSIS — M51.26 HERNIATED LUMBAR DISC WITHOUT MYELOPATHY: ICD-10-CM

## 2023-01-20 DIAGNOSIS — M48.062 SPINAL STENOSIS OF LUMBAR REGION WITH NEUROGENIC CLAUDICATION: ICD-10-CM

## 2023-01-20 DIAGNOSIS — Z79.891 LONG-TERM CURRENT USE OF OPIATE ANALGESIC: ICD-10-CM

## 2023-01-20 DIAGNOSIS — G89.29 CHRONIC BILATERAL LOW BACK PAIN WITHOUT SCIATICA: ICD-10-CM

## 2023-01-20 DIAGNOSIS — G62.9 POLYNEUROPATHY: ICD-10-CM

## 2023-01-20 DIAGNOSIS — M51.9 INTERVERTEBRAL DISC DISORDER: ICD-10-CM

## 2023-01-20 DIAGNOSIS — M54.50 CHRONIC BILATERAL LOW BACK PAIN WITHOUT SCIATICA: ICD-10-CM

## 2023-01-20 DIAGNOSIS — G89.4 CHRONIC PAIN SYNDROME: Primary | ICD-10-CM

## 2023-01-20 DIAGNOSIS — M25.552 HIP PAIN, LEFT: ICD-10-CM

## 2023-01-20 DIAGNOSIS — M47.816 LUMBAR SPONDYLOSIS: ICD-10-CM

## 2023-01-20 RX ORDER — HYDROCODONE BITARTRATE AND ACETAMINOPHEN 10; 325 MG/1; MG/1
TABLET ORAL
Qty: 120 TABLET | Refills: 0 | Status: SHIPPED | OUTPATIENT
Start: 2023-01-20

## 2023-01-20 RX ORDER — GABAPENTIN 400 MG/1
CAPSULE ORAL
Qty: 360 CAPSULE | Refills: 3 | Status: SHIPPED | OUTPATIENT
Start: 2023-01-20

## 2023-01-20 NOTE — PROGRESS NOTES
Assessment:  1  Chronic pain syndrome    2  Chronic bilateral low back pain without sciatica    3  Hip pain, left    4  Intervertebral disc disorder    5  Herniated lumbar disc without myelopathy    6  Polyneuropathy    7  Lumbar spondylosis    8  Spinal stenosis of lumbar region with neurogenic claudication        Plan:  While the patient was in the office today, I did have a thorough conversation with the patient regarding their chronic pain syndrome, symptoms, and medication regimen/treatment plan  I discussed with the patient that at his point time I feel it is definitely important for him to continue to follow-up with wound care as scheduled  The patient was agreeable and verbalized an understanding  With regards to his medication regimen, I discussed with the patient that since he is noting significant and stable overall relief of his chronic pain symptoms and I feel his medication regimen is reasonable and appropriate with his underlying etiology and he has tried and failed every other alternative medication regimen and treatment plan option, we will continue the gabapentin and the as needed Norco as prescribed  The patient was agreeable and verbalized an understanding  [place] Prescription Drug Monitoring Program report was reviewed and was appropriate     While the patient was in the office today they were picked for a random pill count and they did have their medication with them as per the contract guidelines  The count was found to be appropriate and the pill count documentation sheet can be found scanned in to the patient's chart  The patient's opioid scripts were sent to their pharmacy electronically and was given a 3 month supply of prescriptions with a Do Not Fill date(s) of January 25, 2023, February 22, 2023, and March 22, 2023  There are risks associated with opioid medications, including dependence, addiction and tolerance   The patient understands and agrees to use these medications only as prescribed  Potential side effects of the medications include, but are not limited to, constipation, drowsiness, addiction, impaired judgment and risk of fatal overdose if not taken as prescribed  The patient was warned against driving while taking sedation medications  Sharing medications is a felony  At this point in time, the patient is showing no signs of addiction, abuse, diversion or suicidal ideation  An oral drug screen swab was collected at today's office visit  The swab will be sent for confirmatory testing  The drug screen is medically necessary because the patient is either dependent on opioid medication or is being considered for opioid medication therapy and the results could impact ongoing or future treatment  The drug screen is to evaluate for the presences or absence of prescribed, non-prescribed, and/or illicit drugs/substances  The patient will follow-up in 12 weeks for medication prescription refill and reevaluation  The patient was advised to contact the office should their symptoms worsen in the interim  The patient was agreeable and verbalized an understanding  History of Present Illness: The patient is a 61 y o  male last seen on 11/2/22 who presents for a follow up office visit in regards to herniated lumbar disc with spondylosis and stenosis as well as chronic left hip pain and polyneuropathy pain secondary to 11/2/2022  The patient currently reports that since his last office visit the patient has continued to follow-up with wound care for his right foot wound and overall his pain symptoms have remained relatively stable and manageable with his current medication regimen  The patient presents today for medication follow-up visit  Current pain medications includes: Gabapentin 800 mg twice daily and Norco 10/325, 1 p o  4 times daily as needed for pain  The patient reports that this regimen is providing 70% pain relief    The patient is reporting no side effects from this pain medication regimen  Pain Contract Signed: 4/25/2022  Last Urine Drug Screen: 1/20/2023    I have personally reviewed and/or updated the patient's past medical history, past surgical history, family history, social history, current medications, allergies, and vital signs today  Review of Systems:    Review of Systems   Respiratory: Negative for shortness of breath  Cardiovascular: Negative for chest pain  Gastrointestinal: Negative for constipation, diarrhea, nausea and vomiting  Musculoskeletal: Positive for gait problem  Negative for arthralgias, joint swelling and myalgias  Skin: Negative for rash  Neurological: Negative for dizziness, seizures and weakness  All other systems reviewed and are negative  Past Medical History:   Diagnosis Date   • BPH (benign prostatic hyperplasia)    • Lumbar herniated disc    • Osteoarthritis        History reviewed  No pertinent surgical history      Family History   Problem Relation Age of Onset   • No Known Problems Mother    • Diabetes unspecified Father    • Heart disease Father         post CABG   • Diabetes type II Father    • No Known Problems Brother        Social History     Occupational History   • Occupation:  at [employer]   • Smoking status: Every Day     Packs/day: 1 00     Years: 40 00     Pack years: 40 00     Types: Cigarettes   • Smokeless tobacco: Never   Vaping Use   • Vaping Use: Never used   Substance and Sexual Activity   • Alcohol use: Not Currently     Comment: "Not much" per pt   • Drug use: Yes     Types: Marijuana     Comment: "Not much" per pt   • Sexual activity: Yes     Partners: Male     Birth control/protection: None         Current Outpatient Medications:   •  albuterol (PROVENTIL HFA,VENTOLIN HFA) 90 mcg/act inhaler, , Disp: , Rfl:   •  Empagliflozin 25 MG TABS, Take 1 tablet (25 mg total) by mouth every morning, Disp: 90 tablet, Rfl: 1  •  gabapentin (NEURONTIN) 400 mg capsule, Take 2 PO BID , Disp: 360 capsule, Rfl: 3  •  glipiZIDE (GLUCOTROL) 5 mg tablet, TAKE 2 TABLETS BY MOUTH TWICE A DAY, Disp: 360 tablet, Rfl: 3  •  HYDROcodone-acetaminophen (NORCO)  mg per tablet, Take 1 PO QID PRN for pain  DO NOT FILL UNTIL: 1/25/23, Disp: 120 tablet, Rfl: 0  •  HYDROcodone-acetaminophen (NORCO)  mg per tablet, Take 1 PO QID PRN for pain  DO NOT FILL UNTIL: 2/22/23, Disp: 120 tablet, Rfl: 0  •  HYDROcodone-acetaminophen (NORCO)  mg per tablet, Take 1 PO QID PRN for pain  DO NOT FILL UNTIL: 3/22/23, Disp: 120 tablet, Rfl: 0  •  ibuprofen (MOTRIN) 200 mg tablet, Take by mouth every 6 (six) hours as needed for mild pain, Disp: , Rfl:   •  insulin aspart (NovoLOG FlexPen) 100 UNIT/ML injection pen, INJECT INSULIN UTILIZING SLIDING SCALE PRIOR TO EACH MEAL, USING UP TO 60 UNITS DAILY  , Disp: 60 mL, Rfl: 1  •  insulin detemir (Levemir FlexTouch) 100 Units/mL injection pen, Inject 80 Units under the skin daily, Disp: 90 mL, Rfl: 1  •  lisinopril-hydrochlorothiazide (PRINZIDE,ZESTORETIC) 10-12 5 MG per tablet, Take 1 tablet by mouth daily  , Disp: , Rfl:   •  loratadine (CLARITIN) 10 mg tablet, Take 10 mg by mouth daily, Disp: , Rfl:   •  metFORMIN (GLUCOPHAGE) 1000 MG tablet, TAKE 1 TABLET BY MOUTH TWICE A DAY WITH MEALS, Disp: 180 tablet, Rfl: 3  •  simvastatin (ZOCOR) 20 mg tablet, Take 20 mg by mouth  , Disp: , Rfl:   •  tamsulosin (FLOMAX) 0 4 mg, , Disp: , Rfl:   •  CIALIS 20 MG tablet, TAKE 1 TABLET AS NEEDED (Patient not taking: No sig reported), Disp: 10 tablet, Rfl: 2  •  Insulin Pen Needle (B-D UF III MINI PEN NEEDLES) 31G X 5 MM MISC, Use 4 pen needles daily, Disp: 400 each, Rfl: 3  •  Insulin Pen Needle 31G X 6 MM MISC, Use twice daily, Disp: 100 each, Rfl: 3  •  naloxone (NARCAN) 4 mg/0 1 mL nasal spray, Administer 1 spray into a nostril   If no response after 2-3 minutes, give another dose in the other nostril using a new spray , Disp: 1 each, Rfl: 1    No Known Allergies    Physical Exam:    /80 (BP Location: Left arm, Patient Position: Sitting, Cuff Size: Large)   Pulse (!) 116   Temp 98 2 °F (36 8 °C)   Ht 5' 7" (1 702 m)   BMI 38 37 kg/m²     Constitutional:normal, well developed, well nourished, alert, in no distress and non-toxic and no overt pain behavior  and overweight  Eyes:anicteric  HEENT:grossly intact  Neck:supple, symmetric, trachea midline and no masses   Pulmonary:even and unlabored  Cardiovascular:No edema or pitting edema present  Skin:Normal without rashes or lesions and well hydrated  Psychiatric:Mood and affect appropriate  Neurologic:Cranial Nerves II-XII grossly intact  Musculoskeletal:The patient's gait is antalgic, limping, but steady without the use of any assistive devices as the patient does have a right foot brace on because of his nonhealing wound  Imaging  No orders to display         No orders of the defined types were placed in this encounter

## 2023-01-20 NOTE — PATIENT INSTRUCTIONS

## 2023-02-08 DIAGNOSIS — E11.65 TYPE 2 DIABETES MELLITUS WITH HYPERGLYCEMIA, WITH LONG-TERM CURRENT USE OF INSULIN (HCC): Primary | ICD-10-CM

## 2023-02-08 DIAGNOSIS — Z79.4 TYPE 2 DIABETES MELLITUS WITH HYPERGLYCEMIA, WITH LONG-TERM CURRENT USE OF INSULIN (HCC): Primary | ICD-10-CM

## 2023-02-08 RX ORDER — BLOOD-GLUCOSE METER
EACH MISCELLANEOUS
Qty: 1 KIT | Refills: 0 | Status: SHIPPED | OUTPATIENT
Start: 2023-02-08

## 2023-02-08 RX ORDER — BLOOD SUGAR DIAGNOSTIC
STRIP MISCELLANEOUS
Qty: 600 STRIP | Refills: 2 | Status: SHIPPED | OUTPATIENT
Start: 2023-02-08

## 2023-02-08 RX ORDER — LANCETS
EACH MISCELLANEOUS
Qty: 600 EACH | Refills: 2 | Status: SHIPPED | OUTPATIENT
Start: 2023-02-08

## 2023-02-24 DIAGNOSIS — Z79.4 TYPE 2 DIABETES MELLITUS WITH HYPERGLYCEMIA, WITH LONG-TERM CURRENT USE OF INSULIN (HCC): Primary | ICD-10-CM

## 2023-02-24 DIAGNOSIS — E11.65 TYPE 2 DIABETES MELLITUS WITH HYPERGLYCEMIA, WITH LONG-TERM CURRENT USE OF INSULIN (HCC): Primary | ICD-10-CM

## 2023-02-24 RX ORDER — LANCETS 30 GAUGE
EACH MISCELLANEOUS
Qty: 600 EACH | Refills: 2 | Status: SHIPPED | OUTPATIENT
Start: 2023-02-24

## 2023-03-02 DIAGNOSIS — Z79.4 TYPE 2 DIABETES MELLITUS WITH HYPERGLYCEMIA, WITH LONG-TERM CURRENT USE OF INSULIN (HCC): Primary | ICD-10-CM

## 2023-03-02 DIAGNOSIS — E11.65 TYPE 2 DIABETES MELLITUS WITH HYPERGLYCEMIA, WITH LONG-TERM CURRENT USE OF INSULIN (HCC): Primary | ICD-10-CM

## 2023-03-02 RX ORDER — INSULIN ASPART 100 [IU]/ML
INJECTION, SOLUTION INTRAVENOUS; SUBCUTANEOUS
Qty: 60 ML | Refills: 2 | Status: SHIPPED | OUTPATIENT
Start: 2023-03-02

## 2023-03-02 RX ORDER — LANCETS
EACH MISCELLANEOUS
Qty: 600 EACH | Refills: 2 | Status: SHIPPED | OUTPATIENT
Start: 2023-03-02

## 2023-03-05 ENCOUNTER — OFFICE VISIT (OUTPATIENT)
Dept: URGENT CARE | Facility: CLINIC | Age: 58
End: 2023-03-05

## 2023-03-05 VITALS
BODY MASS INDEX: 39.87 KG/M2 | DIASTOLIC BLOOD PRESSURE: 78 MMHG | RESPIRATION RATE: 16 BRPM | TEMPERATURE: 98.6 F | HEART RATE: 107 BPM | OXYGEN SATURATION: 95 % | WEIGHT: 254 LBS | SYSTOLIC BLOOD PRESSURE: 144 MMHG | HEIGHT: 67 IN

## 2023-03-05 DIAGNOSIS — J06.9 VIRAL UPPER RESPIRATORY ILLNESS: Primary | ICD-10-CM

## 2023-03-05 RX ORDER — CEFACLOR 500 MG
500 CAPSULE ORAL 2 TIMES DAILY
COMMUNITY
Start: 2023-01-30

## 2023-03-05 RX ORDER — FLUTICASONE PROPIONATE 50 MCG
1 SPRAY, SUSPENSION (ML) NASAL 2 TIMES DAILY
Qty: 11.1 ML | Refills: 0 | Status: SHIPPED | OUTPATIENT
Start: 2023-03-05

## 2023-03-05 RX ORDER — CETIRIZINE HYDROCHLORIDE 10 MG/1
10 TABLET ORAL DAILY
Qty: 10 TABLET | Refills: 0 | Status: SHIPPED | OUTPATIENT
Start: 2023-03-05 | End: 2023-03-15

## 2023-03-05 RX ORDER — LIDOCAINE HYDROCHLORIDE 20 MG/ML
15 SOLUTION OROPHARYNGEAL 4 TIMES DAILY PRN
Qty: 100 ML | Refills: 0 | Status: SHIPPED | OUTPATIENT
Start: 2023-03-05

## 2023-03-05 RX ORDER — CILOSTAZOL 100 MG/1
100 TABLET ORAL 2 TIMES DAILY
COMMUNITY
Start: 2023-01-11

## 2023-03-05 RX ORDER — ASPIRIN 81 MG
81 TABLET,CHEWABLE ORAL DAILY
COMMUNITY
Start: 2023-01-12

## 2023-03-05 RX ORDER — LEVOFLOXACIN 750 MG/1
750 TABLET ORAL DAILY
COMMUNITY
Start: 2023-01-27

## 2023-03-05 RX ORDER — AMOXICILLIN AND CLAVULANATE POTASSIUM 875; 125 MG/1; MG/1
1 TABLET, FILM COATED ORAL 2 TIMES DAILY
COMMUNITY
Start: 2023-01-24

## 2023-03-05 NOTE — PROGRESS NOTES
[facility]        NAME: David Maza  is a 61 y o  male  : 1965    MRN: [id number]  DATE: 2023  TIME: 3:56 PM    Assessment and Orders   Viral upper respiratory illness [J06 9]  1  Viral upper respiratory illness  cetirizine (ZyrTEC) 10 mg tablet    fluticasone (FLONASE) 50 mcg/act nasal spray    Lidocaine Viscous HCl (XYLOCAINE) 2 % mucosal solution            Plan and Discussion      Symptoms and exam consistent with viral upper respiratory illness  Patient is S/P Augmentin, Ceclor, and Levaquin for cellulitis of right foot  Will treat with Zyrtec, viscous lidocaine and Flonase  Encouraged patient to wean himself off of Afrin as this is likely contributing to his congestion  Encouraged smoking cessation  Discussed ED precautions including (but not limited to)  • Difficultly breathing or shortness of breath  • Chest pain  • Acutely worsening symptoms  Risks and benefits discussed  Patient understands and agrees with the plan  Follow up with PCP  Chief Complaint     Chief Complaint   Patient presents with   • Cold Like Symptoms     Pt reports sinus pressure x3 weeks  C/o a headache since Friday at bedtime only  Recently completed augmentin, ceclor and levaquin for a foot infection  History of Present Illness       Chronic smoker    Sinusitis  This is a new problem  The current episode started 1 to 4 weeks ago (3 weeks)  The problem has been waxing and waning since onset  There has been no fever  Associated symptoms include congestion, coughing (has COPD - chronic), ear pain, headaches, sinus pressure and a sore throat  Past treatments include spray decongestants (has been chronically using Afrin)  Review of Systems   Review of Systems   HENT: Positive for congestion, ear pain, sinus pressure and sore throat  Respiratory: Positive for cough (has COPD - chronic)  Gastrointestinal: Positive for constipation     Neurological: Positive for headaches  Current Medications       Current Outpatient Medications:   •  albuterol (PROVENTIL HFA,VENTOLIN HFA) 90 mcg/act inhaler, , Disp: , Rfl:   •  Aspirin Low Dose 81 MG chewable tablet, Chew 81 mg daily, Disp: , Rfl:   •  Blood Glucose Monitoring Suppl (OneTouch Verio) w/Device KIT, Check blood sugars 6 times a day, Disp: 1 kit, Rfl: 0  •  cetirizine (ZyrTEC) 10 mg tablet, Take 1 tablet (10 mg total) by mouth daily for 10 days, Disp: 10 tablet, Rfl: 0  •  cilostazol (PLETAL) 100 mg tablet, Take 100 mg by mouth 2 (two) times a day, Disp: , Rfl:   •  fluticasone (FLONASE) 50 mcg/act nasal spray, 1 spray into each nostril 2 (two) times a day, Disp: 11 1 mL, Rfl: 0  •  gabapentin (NEURONTIN) 400 mg capsule, Take 2 PO BID , Disp: 360 capsule, Rfl: 3  •  glipiZIDE (GLUCOTROL) 5 mg tablet, TAKE 2 TABLETS BY MOUTH TWICE A DAY, Disp: 360 tablet, Rfl: 3  •  glucose blood (OneTouch Verio) test strip, Check blood sugars 6 times a day, Disp: 600 strip, Rfl: 2  •  HYDROcodone-acetaminophen (NORCO)  mg per tablet, Take 1 PO QID PRN for pain  DO NOT FILL UNTIL: 1/25/23, Disp: 120 tablet, Rfl: 0  •  insulin aspart (NovoLOG FlexPen) 100 UNIT/ML injection pen, INJECT INSULIN UTILIZING SLIDING SCALE PRIOR TO EACH MEAL, USING UP TO 60 UNITS DAILY  , Disp: 60 mL, Rfl: 2  •  insulin detemir (Levemir FlexTouch) 100 Units/mL injection pen, Inject 80 Units under the skin daily, Disp: 90 mL, Rfl: 1  •  Lancets (onetouch ultrasoft) lancets, Use to test blood sugars 6 times a day, Disp: 600 each, Rfl: 2  •  Lidocaine Viscous HCl (XYLOCAINE) 2 % mucosal solution, Swish and spit 15 mL 4 (four) times a day as needed for mouth pain or discomfort Gargle and spit 15mL 4x a day as needed for sore throat, Disp: 100 mL, Rfl: 0  •  lisinopril-hydrochlorothiazide (PRINZIDE,ZESTORETIC) 10-12 5 MG per tablet, Take 1 tablet by mouth daily  , Disp: , Rfl:   •  metFORMIN (GLUCOPHAGE) 1000 MG tablet, TAKE 1 TABLET BY MOUTH TWICE A DAY WITH MEALS, Disp: 180 tablet, Rfl: 3  •  OneTouch Delica Lancets 59R MISC, Use to check blood sugars 6 times a day, Disp: 600 each, Rfl: 2  •  simvastatin (ZOCOR) 20 mg tablet, Take 20 mg by mouth  , Disp: , Rfl:   •  amoxicillin-clavulanate (AUGMENTIN) 875-125 mg per tablet, Take 1 tablet by mouth 2 (two) times a day (Patient not taking: Reported on 3/5/2023), Disp: , Rfl:   •  cefaclor (CECLOR) 500 mg capsule, Take 500 mg by mouth 2 (two) times a day (Patient not taking: Reported on 3/5/2023), Disp: , Rfl:   •  CIALIS 20 MG tablet, TAKE 1 TABLET AS NEEDED (Patient not taking: No sig reported), Disp: 10 tablet, Rfl: 2  •  Empagliflozin 25 MG TABS, Take 1 tablet (25 mg total) by mouth every morning (Patient not taking: Reported on 3/5/2023), Disp: 90 tablet, Rfl: 1  •  HYDROcodone-acetaminophen (NORCO)  mg per tablet, Take 1 PO QID PRN for pain  DO NOT FILL UNTIL: 2/22/23, Disp: 120 tablet, Rfl: 0  •  HYDROcodone-acetaminophen (NORCO)  mg per tablet, Take 1 PO QID PRN for pain  DO NOT FILL UNTIL: 3/22/23, Disp: 120 tablet, Rfl: 0  •  ibuprofen (MOTRIN) 200 mg tablet, Take by mouth every 6 (six) hours as needed for mild pain (Patient not taking: Reported on 3/5/2023), Disp: , Rfl:   •  Insulin Pen Needle (B-D UF III MINI PEN NEEDLES) 31G X 5 MM MISC, Use 4 pen needles daily, Disp: 400 each, Rfl: 3  •  Insulin Pen Needle 31G X 6 MM MISC, Use twice daily, Disp: 100 each, Rfl: 3  •  levofloxacin (LEVAQUIN) 750 mg tablet, Take 750 mg by mouth daily (Patient not taking: Reported on 3/5/2023), Disp: , Rfl:   •  naloxone (NARCAN) 4 mg/0 1 mL nasal spray, Administer 1 spray into a nostril   If no response after 2-3 minutes, give another dose in the other nostril using a new spray , Disp: 1 each, Rfl: 1  •  tamsulosin (FLOMAX) 0 4 mg, , Disp: , Rfl:     Current Allergies     Allergies as of 03/05/2023   • (No Known Allergies)            The following portions of the patient's history were reviewed and updated as appropriate: allergies, current medications, past family history, past medical history, past social history, past surgical history and problem list      Past Medical History:   Diagnosis Date   • BPH (benign prostatic hyperplasia)    • Lumbar herniated disc    • Osteoarthritis        No past surgical history on file  Family History   Problem Relation Age of Onset   • No Known Problems Mother    • Diabetes unspecified Father    • Heart disease Father         post CABG   • Diabetes type II Father    • No Known Problems Brother          Medications have been verified  Objective   /78   Pulse (!) 107   Temp 98 6 °F (37 °C)   Resp 16   Ht 5' 7" (1 702 m)   Wt 115 kg (254 lb)   SpO2 95%   BMI 39 78 kg/m²   No LMP for male patient  Physical Exam     Physical Exam  Constitutional:       General: He is not in acute distress  HENT:      Right Ear: A middle ear effusion is present  Tympanic membrane is not erythematous or bulging  Left Ear:  No middle ear effusion  Tympanic membrane is not erythematous or bulging  Mouth/Throat:      Dentition: Has dentures  Pharynx: Pharyngeal swelling and posterior oropharyngeal erythema present  No oropharyngeal exudate  Tonsils: No tonsillar exudate  Pulmonary:      Breath sounds: Examination of the right-lower field reveals wheezing  Examination of the left-lower field reveals wheezing  Wheezing present  Musculoskeletal:      Comments: Right foot in boot   Neurological:      General: No focal deficit present  Mental Status: He is oriented to person, place, and time  Psychiatric:         Mood and Affect: Mood normal          Behavior: Behavior normal  Behavior is cooperative                 Philmore Bears DO

## 2023-03-08 DIAGNOSIS — Z79.4 TYPE 2 DIABETES MELLITUS WITH HYPERGLYCEMIA, WITH LONG-TERM CURRENT USE OF INSULIN (HCC): ICD-10-CM

## 2023-03-08 DIAGNOSIS — E11.65 TYPE 2 DIABETES MELLITUS WITH HYPERGLYCEMIA, WITH LONG-TERM CURRENT USE OF INSULIN (HCC): ICD-10-CM

## 2023-03-08 RX ORDER — BLOOD-GLUCOSE METER
EACH MISCELLANEOUS
Qty: 1 KIT | Refills: 0 | Status: SHIPPED | OUTPATIENT
Start: 2023-03-08

## 2023-03-15 DIAGNOSIS — Z79.4 TYPE 2 DIABETES MELLITUS WITH HYPERGLYCEMIA, WITH LONG-TERM CURRENT USE OF INSULIN (HCC): Primary | ICD-10-CM

## 2023-03-15 DIAGNOSIS — E11.65 TYPE 2 DIABETES MELLITUS WITH HYPERGLYCEMIA, WITH LONG-TERM CURRENT USE OF INSULIN (HCC): Primary | ICD-10-CM

## 2023-03-15 RX ORDER — LANCETS 30 GAUGE
EACH MISCELLANEOUS
Qty: 600 EACH | Refills: 3 | Status: SHIPPED | OUTPATIENT
Start: 2023-03-15

## 2023-03-24 ENCOUNTER — TELEPHONE (OUTPATIENT)
Dept: PAIN MEDICINE | Facility: CLINIC | Age: 58
End: 2023-03-24

## 2023-03-24 NOTE — TELEPHONE ENCOUNTER
Caller: Alex Bella     Doctor: Kavita    Reason for call: patient is apply for [facility] and he needs a print out of all his medical records and procedures       Call back#: [phone]

## 2023-04-06 LAB
CREAT ?TM UR-SCNC: 64 UMOL/L
EXT ALBUMIN URINE RANDOM: 0.6
HBA1C MFR BLD HPLC: 7.2 %
MICROALBUMIN/CREAT UR: 9 MG/G{CREAT}

## 2023-04-24 ENCOUNTER — TELEPHONE (OUTPATIENT)
Dept: PAIN MEDICINE | Facility: CLINIC | Age: 58
End: 2023-04-24

## 2023-04-24 DIAGNOSIS — M47.816 LUMBAR SPONDYLOSIS: ICD-10-CM

## 2023-04-24 DIAGNOSIS — M51.26 HERNIATED LUMBAR DISC WITHOUT MYELOPATHY: ICD-10-CM

## 2023-04-24 DIAGNOSIS — M51.9 INTERVERTEBRAL DISC DISORDER: ICD-10-CM

## 2023-04-24 DIAGNOSIS — M54.50 CHRONIC BILATERAL LOW BACK PAIN WITHOUT SCIATICA: ICD-10-CM

## 2023-04-24 DIAGNOSIS — M48.062 SPINAL STENOSIS OF LUMBAR REGION WITH NEUROGENIC CLAUDICATION: ICD-10-CM

## 2023-04-24 DIAGNOSIS — G89.29 CHRONIC BILATERAL LOW BACK PAIN WITHOUT SCIATICA: ICD-10-CM

## 2023-04-24 DIAGNOSIS — M25.552 HIP PAIN, LEFT: ICD-10-CM

## 2023-04-24 DIAGNOSIS — G89.4 CHRONIC PAIN SYNDROME: ICD-10-CM

## 2023-04-24 RX ORDER — HYDROCODONE BITARTRATE AND ACETAMINOPHEN 7.5; 325 MG/1; MG/1
TABLET ORAL
Qty: 90 TABLET | Refills: 0 | Status: SHIPPED | OUTPATIENT
Start: 2023-04-24

## 2023-04-24 RX ORDER — HYDROCODONE BITARTRATE AND ACETAMINOPHEN 5; 325 MG/1; MG/1
TABLET ORAL
Qty: 60 TABLET | Refills: 0 | Status: SHIPPED | OUTPATIENT
Start: 2023-04-24

## 2023-04-24 NOTE — TELEPHONE ENCOUNTER
Can you please let the patient know that after discussing it with the  and because they have told us that only SL buprenorphine would have caused that result, especially 2 drug screens so close together, I would have no choice but to discharge him as there is no other explanation and nothing else that we can do  We also changed his script for Norco to be filled on 5/17/23 to 7 5/325 and decreased it to TID PRN dosing and the script for 6/14/23 to 5/325, but decreased it to BID dosing and then he can stop the Norco after that as a way to titrate off of the Norco  We will send a formal discharge letter and cancel his f/u office visit

## 2023-04-24 NOTE — TELEPHONE ENCOUNTER
Can you please call the patient and let him know that his repeat urine drug screen was again positive for buprenorphine and its metabolite and to confirm that he is not taking Suboxone, Subutex, Butrans patch, or Belbuca? We are also going to check with the  for further evaluation to see if there is any other thing that could cause this especially since all of his other drug screens have been appropriate so far  Can you please call [facility] [place] and discuss his results with the  and see if the buprenorphine and norbuprenorphine could be positive for any other reason besides taking those above-mentioned medications?

## 2023-04-24 NOTE — LETTER
4/24/23    Dear Mr Tasia Hauser,    At [facility], our first priority is insuring that our patients receive quality healthcare  The relationship between a health care provider and his patient is built on mutual trust and respect  We believe that we are trained and experienced in making effective medical decisions, that when combined with the cooperation and involvement of our patients, respond positively to their specific health needs  We are committed to providing you with quality health care, but to do so, we must count on you to follow your prescribed treatment  I am particularly concerned about your violation of the opioid contract as your most recent drug screen was positive for buprenorphine and the hydrocodone we are prescribing  We even confirmed with a repeat drug screen in less than a weeks, which was also positive  These behaviors are concerning and at this point we believe that the best course, in your case, would not to be prescribed any opioid medications  I believe it is in your best interest to find a new provider for your medical treatment and to do so in the next 30 days  We will continue to provide emergency care to you for the next 30 days while you seek a new provider  During that time you may call our telephone number [phone], regarding emergency care only  At the end of the 30 days our relationship will officially end and you will no longer be considered a patient here at the [facility]  We suggest that you go to the nearest Hospital Emergency Department in the event of an emergency medical condition  Please ask your new physician’s office to contact our office regarding transfer of your medical records   We regret that this action was necessary, but feel it will be in your best long-term interest      Sincerely,        NIKKO Grimm

## 2023-04-24 NOTE — TELEPHONE ENCOUNTER
S/w pt, advised of above  Pt denied all medications listed  Stated that he did take an antibiotic recently  Advised pt, the writer will fu w/ toxicology as per DG  S/w Dr Mahesh Martínez at [facility] labs  Per Dr Gisel Guzman, this is a very high result for buprenorphine, it would have to be a SL dose and it would have to have been taken very recently before the drug test  Per Dr Mahesh Martínez, only buprenorphine would produce this result  Thanked Dr Mahesh Martínez and advised that DG will be made aware  Dr Mahesh Martínez verbalized understanding and appreciation

## 2023-04-24 NOTE — LETTER
After obtaining consent, and per verbal order from Dr. Nataliya Schaeffer*, patient received influenza vaccine given by SY Dao Deltoid. Influenza Vaccine 0.5 mL IM now. Patient was observed for 10 minutes post injection. Patient tolerated injection well. Linda Beal: 4/26/23 4/24/23    Dear Mr Tyler Jaimes,    At [facility], our first priority is insuring that our patients receive quality healthcare  The relationship between a health care provider and his patient is built on mutual trust and respect  We believe that we are trained and experienced in making effective medical decisions, that when combined with the cooperation and involvement of our patients, respond positively to their specific health needs  We are committed to providing you with quality health care, but to do so, we must count on you to follow your prescribed treatment  I am particularly concerned about your violation of the opioid contract as your most recent drug screen was positive for buprenorphine and the hydrocodone we are prescribing  We even confirmed with a repeat drug screen in less than a weeks, which was also positive  These behaviors are concerning and at this point we believe that the best course, in your case, would not to be prescribed any opioid medications  I believe it is in your best interest to find a new provider for your medical treatment and to do so in the next 30 days  We will continue to provide emergency care to you for the next 30 days while you seek a new provider  During that time you may call our telephone number [phone], regarding emergency care only  At the end of the 30 days our relationship will officially end and you will no longer be considered a patient here at the [facility]  We suggest that you go to the nearest Hospital Emergency Department in the event of an emergency medical condition  Please ask your new physician’s office to contact our office regarding transfer of your medical records   We regret that this action was necessary, but feel it will be in your best long-term interest      Sincerely,        NIKKO Barkley

## 2023-05-02 ENCOUNTER — TELEPHONE (OUTPATIENT)
Dept: ENDOCRINOLOGY | Facility: HOSPITAL | Age: 58
End: 2023-05-02

## 2023-05-02 DIAGNOSIS — Z79.4 TYPE 2 DIABETES MELLITUS WITH HYPERGLYCEMIA, WITH LONG-TERM CURRENT USE OF INSULIN (HCC): Primary | ICD-10-CM

## 2023-05-02 DIAGNOSIS — E11.65 TYPE 2 DIABETES MELLITUS WITH HYPERGLYCEMIA, WITH LONG-TERM CURRENT USE OF INSULIN (HCC): Primary | ICD-10-CM

## 2023-05-02 RX ORDER — LANCETS
EACH MISCELLANEOUS
Qty: 600 EACH | Refills: 3 | Status: SHIPPED | OUTPATIENT
Start: 2023-05-02

## 2023-05-02 RX ORDER — BLOOD SUGAR DIAGNOSTIC
STRIP MISCELLANEOUS
Qty: 600 STRIP | Refills: 3 | Status: SHIPPED | OUTPATIENT
Start: 2023-05-02

## 2023-05-02 RX ORDER — BLOOD-GLUCOSE METER
EACH MISCELLANEOUS DAILY
Qty: 1 KIT | Refills: 0 | Status: SHIPPED | OUTPATIENT
Start: 2023-05-02

## 2023-05-02 NOTE — TELEPHONE ENCOUNTER
Received call from patient  His One Touch meter stopped working, he would like a replacement, but a different brand, what would you prefer?

## 2023-05-05 ENCOUNTER — EVALUATION (OUTPATIENT)
Dept: PHYSICAL THERAPY | Facility: CLINIC | Age: 58
End: 2023-05-05

## 2023-05-05 DIAGNOSIS — M54.50 CHRONIC BILATERAL LOW BACK PAIN WITHOUT SCIATICA: ICD-10-CM

## 2023-05-05 DIAGNOSIS — G89.29 CHRONIC BILATERAL LOW BACK PAIN WITHOUT SCIATICA: ICD-10-CM

## 2023-05-05 DIAGNOSIS — M48.062 SPINAL STENOSIS OF LUMBAR REGION WITH NEUROGENIC CLAUDICATION: ICD-10-CM

## 2023-05-05 DIAGNOSIS — M47.816 LUMBAR SPONDYLOSIS: ICD-10-CM

## 2023-05-05 DIAGNOSIS — M51.26 HERNIATED LUMBAR DISC WITHOUT MYELOPATHY: ICD-10-CM

## 2023-05-05 NOTE — PROGRESS NOTES
PT Evaluation   Treatment and documentation performed by Pablo Christianson SPT under the direct supervision of Marlin Rahman PT, DPT  Today's date: 2023  Patient name: Frank Lakhani  : 1965  MRN: [id number]  Referring provider: NIKKO Dang  Dx:   Encounter Diagnosis     ICD-10-CM    1  Herniated lumbar disc without myelopathy  M51 26 Ambulatory referral to Physical Therapy      2  Spinal stenosis of lumbar region with neurogenic claudication  M48 062 Ambulatory referral to Physical Therapy      3  Chronic bilateral low back pain without sciatica  M54 50 Ambulatory referral to Physical Therapy    G89 29       4  Lumbar spondylosis  M47 816 Ambulatory referral to Physical Therapy                     Assessment  Assessment details: Frank Lakhani  is a pleasant 60 y o  male who presents with chronic LBP that has worsened over the past 2 weeks  The patient's greatest concerns are persistent pain with ADLs  They have a primary movement diagnosis of lumbar hypomobility resulting in restricted and painful lumbar ROM, restricted (B) hip flexion, and (B) hip and knee weakness limiting his ability to walk or stand for long periods of time, climb stairs, and perform household chores  These signs and symptoms are consistent with Herniated lumbar disc without myelopathy; Spinal stenosis of lumbar region with neurogenic claudication; Chronic bilateral low back pain without sciatica; Lumbar spondylosis  Patient will benefit from skilled PT to address impairments and improve lumbar and hip mobility and LE strength to be able to walk for long periods without increased pain   At this time no referral is necessary based on exam   Impairments: abnormal gait, abnormal or restricted ROM, impaired balance, impaired physical strength, lacks appropriate home exercise program, pain with function, poor posture  and poor body mechanics  Understanding of Dx/Px/POC: good   Prognosis: fair  Prognosis details: Positive prognostic indicators include positive attitude toward recovery, motivated to improve, good understanding of condition, realistic expectations  Negative prognostic indicators include chronicity of symptoms  Goals  Goals to be met in 4 weeks  - Increase L hip flexion ROM by 5-10 degrees  - Increase lumbar extension to minimal restriction  - increase (B) hip flexion by 1/2 MMT grade  - Able to walk for 10 minutes without increased pain    Goals to be met in 8 weeks  - Able to walk for a half hour without increased pain  - Able to work around the house without increased pain  - Ind with HEP    Plan  Plan details: Patient will have skilled PT 2x/week tapering to 1x with adherence to HEP  Patient would benefit from: skilled physical therapy  Referral necessary: No  Planned modality interventions: cryotherapy and thermotherapy: hydrocollator packs  Planned therapy interventions: manual therapy, joint mobilization, neuromuscular re-education, therapeutic exercise, therapeutic activities, strengthening, stretching, home exercise program, gait training, functional ROM exercises, body mechanics training, balance and patient education  Frequency: 2x week  Duration in weeks: 8  Plan of Care beginning date: 5/5/2023  Plan of Care expiration date: 6/30/2023  Treatment plan discussed with: patient        Subjective Evaluation    History of Present Illness  Date of onset: 4/21/2023  Mechanism of injury: Patient presents with chronic LBP that has worsened in the past 2 weeks  Notes pain (B) in the low back but is greater on the L side with no radicular symptoms  History of feeling unsteady when walking  He had PT 8-10 years ago for his back and notes the lumbar extension exercises he was given helps relieve his pain  Reports he is PWB in his R foot for another 2 weeks due to 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 and is using a knee scooter   Walking or standing for long periods aggravates his symptoms and notes pain medication and sitting reduces his pain  Recurrent probem    Pain  Current pain ratin  At best pain ratin  At worst pain ratin  Quality: pressure and squeezing  Relieving factors: rest and medications  Aggravating factors: standing, walking and stair climbing    Social Support  Steps to enter house: yes  Lives in: apartment  Lives with: spouse    Employment status: not working  Treatments  Previous treatment: physical therapy and medication  Current treatment: medication  Patient Goals  Patient goals for therapy: increased motion  Patient goal: Walking longer distances and improving flexibility without increased pain  Objective     Active Range of Motion     Lumbar   Flexion:  Restriction level: moderate  Extension:  with pain Restriction level: maximal  Left Hip   Flexion: 86 degrees   External rotation (90/90): [place]  Internal rotation (90/90): WFL and with pain    Right Hip   Flexion: 110 degrees   External rotation (90/90): [facility]  Internal rotation (90/90): [facility]    Additional Active Range of Motion Details  Right side glide: restricted and painful  Left side glide: restricted    Prone on elbows: decreased, no effect      Strength/Myotome Testing     Left Hip   Planes of Motion   Flexion: 4-  External rotation: 4+  Internal rotation: 4+    Right Hip   Planes of Motion   Flexion: 4-  External rotation: 4+  Internal rotation: 4+    Left Knee   Flexion: 4  Extension: 4    Right Knee   Flexion: 4  Extension: 4+    Tests     Lumbar     Left   Negative crossed SLR and passive SLR  Right   Negative crossed SLR and passive SLR  Left Hip   Positive BENJIE  Negative FADIR  Right Hip   Negative BENJIE and FADIR  Ambulation   Weight-Bearing Status   Weight-Bearing Status (Right): partial weight-bearing    Assistive device used: kneeling walker    Additional Weight-Bearing Status Details  PWB till 23               Precautions: T2 DM, HTN, hyperlipidemia, PWB on R foot for 2 weeks  Primary impairments: limited lumbar ROM, limited hip flexion ROM, (B) hip and knee weakness   Functional limitation: walking or standing for long periods, stairs, performing household chores  POC expiration: 6/30/23  [employer] Code: [email or web address]       Manuals 5/5                                                                Neuro Re-Ed                                                                                                        Ther Ex             Prone on elbows             Repeated lumbar extension standing HEP            Seated hamstring stretch HEP                                                                             Ther Activity                                       Gait Training                                       Modalities

## 2023-05-05 NOTE — LETTER
May 5, 2023    Wilfredo White MD  [address] [facility]    Patient: Matt Leahy  YOB: 1965   Date of Visit: 2023     Encounter Diagnosis     ICD-10-CM    1  Herniated lumbar disc without myelopathy  M51 26 Ambulatory referral to Physical Therapy      2  Spinal stenosis of lumbar region with neurogenic claudication  M48 062 Ambulatory referral to Physical Therapy      3  Chronic bilateral low back pain without sciatica  M54 50 Ambulatory referral to Physical Therapy    G89 29       4  Lumbar spondylosis  M47 816 Ambulatory referral to Physical Therapy          Dear Dr Viveca Hamman:    Thank you for your recent referral of Matt Leahy    Please review the attached evaluation summary from Paxton's recent visit  Please verify that you agree with the plan of care by signing the attached order  If you have any questions or concerns, please do not hesitate to call  I sincerely appreciate the opportunity to share in the care of one of your patients and hope to have another opportunity to work with you in the near future  Sincerely,    Analisa Mullen      Referring Provider:      I certify that I have read the below Plan of Care and certify the need for these services furnished under this plan of treatment while under my care  Wilfredo White MD  [facility]  [address] [facility]  Via Fax: [phone]          PT Evaluation   Treatment and documentation performed by Donna CAMARGO under the direct supervision of Marlin Rahman PT, DPT  Today's date: 2023  Patient name: Matt Leahy  : 1965  MRN: [id number]  Referring provider: NIKKO Hdez  Dx:   Encounter Diagnosis     ICD-10-CM    1  Herniated lumbar disc without myelopathy  M51 26 Ambulatory referral to Physical Therapy      2  Spinal stenosis of lumbar region with neurogenic claudication  M48 062 Ambulatory referral to Physical Therapy      3   Chronic bilateral low back pain without sciatica  M54 50 Ambulatory referral to Physical Therapy    G89 29       4  Lumbar spondylosis  M47 816 Ambulatory referral to Physical Therapy                     Assessment  Assessment details: Bo Prasad  is a pleasant 60 y o  male who presents with chronic LBP that has worsened over the past 2 weeks  The patient's greatest concerns are persistent pain with ADLs  They have a primary movement diagnosis of lumbar hypomobility resulting in restricted and painful lumbar ROM, restricted (B) hip flexion, and (B) hip and knee weakness limiting his ability to walk or stand for long periods of time, climb stairs, and perform household chores  These signs and symptoms are consistent with Herniated lumbar disc without myelopathy; Spinal stenosis of lumbar region with neurogenic claudication; Chronic bilateral low back pain without sciatica; Lumbar spondylosis  Patient will benefit from skilled PT to address impairments and improve lumbar and hip mobility and LE strength to be able to walk for long periods without increased pain  At this time no referral is necessary based on exam   Impairments: abnormal gait, abnormal or restricted ROM, impaired balance, impaired physical strength, lacks appropriate home exercise program, pain with function, poor posture  and poor body mechanics  Understanding of Dx/Px/POC: good   Prognosis: fair  Prognosis details: Positive prognostic indicators include positive attitude toward recovery, motivated to improve, good understanding of condition, realistic expectations  Negative prognostic indicators include chronicity of symptoms         Goals  Goals to be met in 4 weeks  - Increase L hip flexion ROM by 5-10 degrees  - Increase lumbar extension to minimal restriction  - increase (B) hip flexion by 1/2 MMT grade  - Able to walk for 10 minutes without increased pain    Goals to be met in 8 weeks  - Able to walk for a half hour without increased pain  - Able to work around the house without increased pain  - Ind with Selena HOPKINS Wes Townsend details: Patient will have skilled PT 2x/week tapering to 1x with adherence to HEP  Patient would benefit from: skilled physical therapy  Referral necessary: No  Planned modality interventions: cryotherapy and thermotherapy: hydrocollator packs  Planned therapy interventions: manual therapy, joint mobilization, neuromuscular re-education, therapeutic exercise, therapeutic activities, strengthening, stretching, home exercise program, gait training, functional ROM exercises, body mechanics training, balance and patient education  Frequency: 2x week  Duration in weeks: 8  Plan of Care beginning date: 2023  Plan of Care expiration date: 2023  Treatment plan discussed with: patient        Subjective Evaluation    History of Present Illness  Date of onset: 2023  Mechanism of injury: Patient presents with chronic LBP that has worsened in the past 2 weeks  Notes pain (B) in the low back but is greater on the L side with no radicular symptoms  History of feeling unsteady when walking  He had PT 8-10 years ago for his back and notes the lumbar extension exercises he was given helps relieve his pain  Reports he is PWB in his R foot for another 2 weeks due to 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 and is using a knee scooter  Walking or standing for long periods aggravates his symptoms and notes pain medication and sitting reduces his pain            Recurrent probem    Pain  Current pain ratin  At best pain ratin  At worst pain ratin  Quality: pressure and squeezing  Relieving factors: rest and medications  Aggravating factors: standing, walking and stair climbing    Social Support  Steps to enter house: yes  Lives in: apartment  Lives with: spouse    Employment status: not working  Treatments  Previous treatment: physical therapy and medication  Current treatment: medication  Patient Goals  Patient goals for therapy: increased motion  Patient goal: Walking longer distances and improving flexibility without increased pain  Objective     Active Range of Motion     Lumbar   Flexion:  Restriction level: moderate  Extension:  with pain Restriction level: maximal  Left Hip   Flexion: 86 degrees   External rotation (90/90): [facility]  Internal rotation (90/90): WFL and with pain    Right Hip   Flexion: 110 degrees   External rotation (90/90): [facility]  Internal rotation (90/90): [facility]    Additional Active Range of Motion Details  Right side glide: restricted and painful  Left side glide: restricted    Prone on elbows: decreased, no effect      Strength/Myotome Testing     Left Hip   Planes of Motion   Flexion: 4-  External rotation: 4+  Internal rotation: 4+    Right Hip   Planes of Motion   Flexion: 4-  External rotation: 4+  Internal rotation: 4+    Left Knee   Flexion: 4  Extension: 4    Right Knee   Flexion: 4  Extension: 4+    Tests     Lumbar     Left   Negative crossed SLR and passive SLR  Right   Negative crossed SLR and passive SLR  Left Hip   Positive BENJIE  Negative FADIR  Right Hip   Negative BENJIE and FADIR  Ambulation   Weight-Bearing Status   Weight-Bearing Status (Right): partial weight-bearing    Assistive device used: kneeling walker    Additional Weight-Bearing Status Details  PWB till 5/19/23              Precautions: T2 DM, HTN, hyperlipidemia, PWB on R foot for 2 weeks  Primary impairments: limited lumbar ROM, limited hip flexion ROM, (B) hip and knee weakness   Functional limitation: walking or standing for long periods, stairs, performing household chores  POC expiration: 6/30/23  [facility] [email or web address]       Manuals 5/5                                                                Neuro Re-Ed                                                                                                        Ther Ex             Prone on elbows             Repeated lumbar extension standing HEP Seated hamstring stretch HEP                                                                             Ther Activity                                       Gait Training                                       Modalities                                           Attestation signed by Isael Dawson at 5/5/2023  2:13 PM:  I supervised the visit  We discussed the case to ensure appropriate continuation and progression of care and I reviewed the documentation

## 2023-05-09 ENCOUNTER — OFFICE VISIT (OUTPATIENT)
Dept: PHYSICAL THERAPY | Facility: CLINIC | Age: 58
End: 2023-05-09

## 2023-05-09 DIAGNOSIS — G89.29 CHRONIC BILATERAL LOW BACK PAIN WITHOUT SCIATICA: ICD-10-CM

## 2023-05-09 DIAGNOSIS — M51.26 HERNIATED LUMBAR DISC WITHOUT MYELOPATHY: Primary | ICD-10-CM

## 2023-05-09 DIAGNOSIS — M48.062 SPINAL STENOSIS OF LUMBAR REGION WITH NEUROGENIC CLAUDICATION: ICD-10-CM

## 2023-05-09 DIAGNOSIS — M47.816 LUMBAR SPONDYLOSIS: ICD-10-CM

## 2023-05-09 DIAGNOSIS — M54.50 CHRONIC BILATERAL LOW BACK PAIN WITHOUT SCIATICA: ICD-10-CM

## 2023-05-09 NOTE — PROGRESS NOTES
"Daily Note     Today's date: 2023  Patient name: Betty Ramos  : 1965  MRN: [id number]  Referring provider: NIKKO Moreno  Dx:   Encounter Diagnosis     ICD-10-CM    1  Herniated lumbar disc without myelopathy  M51 26       2  Spinal stenosis of lumbar region with neurogenic claudication  M48 062       3  Chronic bilateral low back pain without sciatica  M54 50     G89 29       4  Lumbar spondylosis  M47 816                      Subjective: Patient reports no issues with the exercises at home - notes he hasn't done them as often as he should have  Objective: See treatment diary below      Assessment: Patient tolerated treatment well  Initiated general mobility exercises for the lumbar spine - patient reported feeling better at end of visit and feeling more mobile   He will benefit from continued skilled PT to address impairments and return to PLOF      Plan: Progress general lumbar mobility; hold weight bearing activities due to R foot     Precautions: T2 DM, HTN, hyperlipidemia, PWB on R foot for 2 weeks  Primary impairments: limited lumbar ROM, limited hip flexion ROM, (B) hip and knee weakness   Functional limitation: walking or standing for long periods, stairs, performing household chores  POC expiration: 23  [employer] Code: [id number]       Manuals                                                                Neuro Re-Ed                                                                                                        Ther Ex             Prone on elbows             Repeated lumbar extension standing HEP            Seated hamstring stretch HEP            LTR   5\"x10 ea           DKTC with pball under legs for AAROM  2x10           bridge  10x           Seated lumbar flexion  10x           3 way pball rollout  5\"x5ea           LAQ             hooklying add             hooklying abd                          Ther Activity                                       Gait " Training                                       Modalities

## 2023-05-11 ENCOUNTER — OFFICE VISIT (OUTPATIENT)
Dept: PHYSICAL THERAPY | Facility: CLINIC | Age: 58
End: 2023-05-11

## 2023-05-11 DIAGNOSIS — M48.062 SPINAL STENOSIS OF LUMBAR REGION WITH NEUROGENIC CLAUDICATION: ICD-10-CM

## 2023-05-11 DIAGNOSIS — M54.50 CHRONIC BILATERAL LOW BACK PAIN WITHOUT SCIATICA: ICD-10-CM

## 2023-05-11 DIAGNOSIS — M51.26 HERNIATED LUMBAR DISC WITHOUT MYELOPATHY: Primary | ICD-10-CM

## 2023-05-11 DIAGNOSIS — M47.816 LUMBAR SPONDYLOSIS: ICD-10-CM

## 2023-05-11 DIAGNOSIS — G89.29 CHRONIC BILATERAL LOW BACK PAIN WITHOUT SCIATICA: ICD-10-CM

## 2023-05-11 NOTE — PROGRESS NOTES
"Daily Note     Today's date: 2023  Patient name: Odalis Garcia  : 1965  MRN: [id number]  Referring provider: NIKKO Mistry  Dx:   Encounter Diagnosis     ICD-10-CM    1  Herniated lumbar disc without myelopathy  M51 26       2  Spinal stenosis of lumbar region with neurogenic claudication  M48 062       3  Chronic bilateral low back pain without sciatica  M54 50     G89 29       4  Lumbar spondylosis  M47 816           Subjective: pain in L hip due to \"popping\" in shower which he thinks is a tendon issue, pain persists upon       Objective: See treatment diary below      Assessment: Tolerated treatment well with progression of treatment program as noted below requiring verbal and tactile cues from PT for safe execution of therapeutic exercise  Patient's full, pain-free lumbopelvic mobility remains limited at this time, contributing to functional deficits  During NMR today, pt tdemonstrated fatigue post treatment, exhibited good technique with therapeutic exercises and would benefit from continued PT      Plan: Progress treatment as tolerated         Precautions: T2 DM, HTN, hyperlipidemia, PWB on R foot for 2 weeks  Primary impairments: limited lumbar ROM, limited hip flexion ROM, (B) hip and knee weakness   Functional limitation: walking or standing for long periods, stairs, performing household chores  POC expiration: 23  [employer] Code: [email or web address]       Manuals           L Femoacetabular joint AP mobs   G3-4 4'          Rom stretch b/l   NS 4'                          8'          Neuro Re-Ed             SAQ iso   x20 ea 3\" hold          TvA iso   10x10\"                                                                           Ther Ex             Prone on elbows             Repeated lumbar extension standing HEP            Seated hamstring stretch HEP            LTR   5\"x10 ea 10\"x10 ea          DKTC with pball under legs for AAROM  2x10           bridge  10x         " "  Seated lumbar flexion  10x           3 way pball rollout  5\"x5ea           LAQ             hooklying add             hooklying abd                          Ther Activity             TvA activation in supine laying on heat   8' with diaphragmatic breathing, 10\" TvA holds          Pt education: pathoanatomy, nature of sxs, POC, HEP   7' NS          Gait Training                                       Modalities                                            "

## 2023-05-15 ENCOUNTER — APPOINTMENT (OUTPATIENT)
Dept: PHYSICAL THERAPY | Facility: CLINIC | Age: 58
End: 2023-05-15
Payer: COMMERCIAL

## 2023-05-16 ENCOUNTER — TELEPHONE (OUTPATIENT)
Dept: ADMINISTRATIVE | Facility: OTHER | Age: 58
End: 2023-05-16

## 2023-05-16 ENCOUNTER — OFFICE VISIT (OUTPATIENT)
Dept: ENDOCRINOLOGY | Facility: HOSPITAL | Age: 58
End: 2023-05-16

## 2023-05-16 VITALS
BODY MASS INDEX: 40.87 KG/M2 | SYSTOLIC BLOOD PRESSURE: 132 MMHG | WEIGHT: 260.4 LBS | HEIGHT: 67 IN | HEART RATE: 106 BPM | DIASTOLIC BLOOD PRESSURE: 82 MMHG

## 2023-05-16 DIAGNOSIS — Z79.4 TYPE 2 DIABETES MELLITUS WITH HYPERGLYCEMIA, WITH LONG-TERM CURRENT USE OF INSULIN (HCC): Primary | ICD-10-CM

## 2023-05-16 DIAGNOSIS — Z79.4 TYPE 2 DIABETES MELLITUS WITH HYPERGLYCEMIA, WITH LONG-TERM CURRENT USE OF INSULIN (HCC): ICD-10-CM

## 2023-05-16 DIAGNOSIS — E11.65 TYPE 2 DIABETES MELLITUS WITH HYPERGLYCEMIA, WITHOUT LONG-TERM CURRENT USE OF INSULIN (HCC): ICD-10-CM

## 2023-05-16 DIAGNOSIS — I10 PRIMARY HYPERTENSION: ICD-10-CM

## 2023-05-16 DIAGNOSIS — E11.65 TYPE 2 DIABETES MELLITUS WITH HYPERGLYCEMIA, WITH LONG-TERM CURRENT USE OF INSULIN (HCC): ICD-10-CM

## 2023-05-16 DIAGNOSIS — E11.65 TYPE 2 DIABETES MELLITUS WITH HYPERGLYCEMIA, WITH LONG-TERM CURRENT USE OF INSULIN (HCC): Primary | ICD-10-CM

## 2023-05-16 DIAGNOSIS — E11.42 DIABETIC POLYNEUROPATHY ASSOCIATED WITH TYPE 2 DIABETES MELLITUS (HCC): ICD-10-CM

## 2023-05-16 DIAGNOSIS — E78.5 DYSLIPIDEMIA: ICD-10-CM

## 2023-05-16 RX ORDER — GLIPIZIDE 5 MG/1
TABLET ORAL
Qty: 360 TABLET | Refills: 3 | Status: SHIPPED | OUTPATIENT
Start: 2023-05-16

## 2023-05-16 RX ORDER — INSULIN ASPART 100 [IU]/ML
INJECTION, SOLUTION INTRAVENOUS; SUBCUTANEOUS
Qty: 60 ML | Refills: 2 | Status: SHIPPED | OUTPATIENT
Start: 2023-05-16

## 2023-05-16 RX ORDER — INSULIN DETEMIR 100 [IU]/ML
80 INJECTION, SOLUTION SUBCUTANEOUS DAILY
Qty: 75 ML | Refills: 2 | Status: SHIPPED | OUTPATIENT
Start: 2023-05-16

## 2023-05-16 RX ORDER — FLURBIPROFEN SODIUM 0.3 MG/ML
SOLUTION/ DROPS OPHTHALMIC
Qty: 400 EACH | Refills: 3 | Status: SHIPPED | OUTPATIENT
Start: 2023-05-16

## 2023-05-16 RX ORDER — FINASTERIDE 5 MG/1
5 TABLET, FILM COATED ORAL DAILY
COMMUNITY

## 2023-05-16 NOTE — PROGRESS NOTES
Tracy Sierra  60 y o  male MRN: [id number]    Encounter: [id number]      Assessment/Plan     Assessment: This is a 60y o -year-old male with type 2 diabetes with neuropathy, hypertension and hyperlipidemia  Plan:  1  Type 2 diabetes:  Most recent hemoglobin A1c was 7 2  Due to wrist surgery has not been as active and has gained 20 pounds which is the likely cause of his increase in A1c  Is interested in starting Ozempic to better control glucose levels, and hopefully lose weight  We will start 0 25 mg weekly and increase to 0 5 mg weekly after 1 month  Clifford side effects of medication  He will continue with glipizide 5 mg 2 tablets twice a day, metformin 1000 mg twice a day, Levemir 80 units daily, and NovoLog 18 units with each meal   Continue checking glucose levels 3 more times a day  Would like to see blood sugar logs in about 2 to 4 weeks so we can make further adjustments  Contact the office with any concerns or questions  Follow-up in 6 months with lab work completed prior to visit      2  Diabetic neuropathy: Unable to perform full diabetic foot exam as right foot is currently wrapped  Does follow-up with wound care and podiatry      3  Hypertension:  Normotensive in the office today   Kidney function remains stable with normal electrolytes   Continue with current medications  Repeat CMP prior to next office visit        4  Hyperlipidemia:  Cholesterol doing well   Will continue with current medication   Repeat lipid panel prior to next office visit      CC: Type 2 diabetes follow-up    History of Present Illness     HPI:  Miah Walker   is a 60year old male with type 2 diabetes for 3 years   He is on oral agents and insulin at home and takes Metformin  mg 2 tablets twice a day, and glipizide 5 mg 2 tablets twice a day, levemir 80 units daily, and Humalog 18 units with each meal   Previously on Farxiga and Jardiance, but discontinued this class of medication as they have been too expensive  States that he recently has some extra money, and is interested in trying Ozempic   He denies nephropathy, retinopathy, heart attack, stroke and claudication but does admit to neuropathy, recurrent ulcers, polydipsia, polyphagia, polyuria  He is currently frustrated at this time due to complications from his diabetes  Is supposed to stay off his feet which has led to a decrease in physical activity, and weight gain  Between a lack of activity and some depression this is also led to increase in eating      Hypoglycemic episodes: No       The patient's last eye exam was over the summer   The patient's last foot exam was in April 2022      Blood Sugar/Glucometer/Pump/CGM review:  Is checking blood sugar 4 times a day, but did not bring his glucometer into the office  States blood sugars have been running higher        He has hypertension and takes lisinopril / HCTZ 10/12 5 mg daily  He has hyperlipidemia and takes simvastatin 20 mg daily  Denies any headaches, chest pain, MI or stroke-like symptoms  Review of Systems   Constitutional: Negative for chills, fatigue and fever  HENT: Negative for congestion, ear pain, hearing loss, rhinorrhea and sore throat  Eyes: Negative for pain and visual disturbance  Respiratory: Negative for cough, shortness of breath and wheezing  Cardiovascular: Negative for chest pain, palpitations and leg swelling  Gastrointestinal: Negative for abdominal pain, blood in stool, constipation, diarrhea, nausea and vomiting  Endocrine: Negative for cold intolerance, heat intolerance and polyuria  Genitourinary: Negative for difficulty urinating, dysuria, frequency, hematuria and urgency  Musculoskeletal: Negative for arthralgias, joint swelling and myalgias  Skin: Positive for wound (Right foot)  Negative for rash  Neurological: Negative for dizziness, syncope, weakness, numbness and headaches     Psychiatric/Behavioral: Negative for dysphoric mood and "sleep disturbance  The patient is not nervous/anxious  Historical Information   Past Medical History:   Diagnosis Date   • BPH (benign prostatic hyperplasia)    • Lumbar herniated disc    • Osteoarthritis      History reviewed  No pertinent surgical history    Social History   Social History     Substance and Sexual Activity   Alcohol Use Not Currently    Comment: \"Not much\" per pt     Social History     Substance and Sexual Activity   Drug Use Yes   • Types: Marijuana    Comment: \"Not much\" per pt     Social History     Tobacco Use   Smoking Status Every Day   • Packs/day: 1 00   • Years: 40 00   • Pack years: 40 00   • Types: Cigarettes   Smokeless Tobacco Never     Family History:   Family History   Problem Relation Age of Onset   • No Known Problems Mother    • Diabetes unspecified Father    • Heart disease Father         post CABG   • Diabetes type II Father    • No Known Problems Brother        Meds/Allergies   Current Outpatient Medications   Medication Sig Dispense Refill   • Accu-Chek Softclix Lancets lancets Check glucose 6 times daily 600 each 3   • albuterol (PROVENTIL HFA,VENTOLIN HFA) 90 mcg/act inhaler      • Aspirin Low Dose 81 MG chewable tablet Chew 81 mg daily     • Blood Glucose Monitoring Suppl (Accu-Chek Guide) w/Device KIT Use in the morning 1 kit 0   • cilostazol (PLETAL) 100 mg tablet Take 100 mg by mouth 2 (two) times a day     • finasteride (PROSCAR) 5 mg tablet Take 5 mg by mouth daily     • fluticasone (FLONASE) 50 mcg/act nasal spray 1 spray into each nostril 2 (two) times a day 11 1 mL 0   • gabapentin (NEURONTIN) 400 mg capsule Take 2 PO BID  360 capsule 3   • glipiZIDE (GLUCOTROL) 5 mg tablet TAKE 2 TABLETS BY MOUTH TWICE A DAY (Patient taking differently: Take 10 mg by mouth 2 (two) times a day before meals) 360 tablet 3   • glucose blood (Accu-Chek Guide) test strip Check glucose 6 times daily 600 strip 3   • HYDROcodone-acetaminophen (NORCO) 7 5-325 mg per tablet Take 1 PO " TID PRN for pain  DO NOT FILL UNTIL: 5/17/23 90 tablet 0   • insulin aspart (NovoLOG FlexPen) 100 UNIT/ML injection pen INJECT INSULIN UTILIZING SLIDING SCALE PRIOR TO EACH MEAL, USING UP TO 60 UNITS DAILY  60 mL 2   • insulin detemir (Levemir FlexTouch) 100 Units/mL injection pen Inject 80 Units under the skin daily 90 mL 1   • Insulin Pen Needle (B-D UF III MINI PEN NEEDLES) 31G X 5 MM MISC Use 4 pen needles daily 400 each 3   • Insulin Pen Needle 31G X 6 MM MISC Use twice daily 100 each 3   • lisinopril-hydrochlorothiazide (PRINZIDE,ZESTORETIC) 10-12 5 MG per tablet Take 1 tablet by mouth daily       • metFORMIN (GLUCOPHAGE) 1000 MG tablet TAKE 1 TABLET BY MOUTH TWICE A DAY WITH MEALS 180 tablet 3   • semaglutide, 0 25 or 0 5 mg/dose, (Ozempic, 0 25 or 0 5 MG/DOSE,) 2 mg/3 mL injection pen Inject 0 38 mL (0 2533 mg total) under the skin every 7 days Increase to 0 5 mg weekly after 4 weeks  3 mL 5   • simvastatin (ZOCOR) 20 mg tablet Take 20 mg by mouth       • amoxicillin-clavulanate (AUGMENTIN) 875-125 mg per tablet Take 1 tablet by mouth 2 (two) times a day (Patient not taking: Reported on 5/16/2023)     • cetirizine (ZyrTEC) 10 mg tablet Take 1 tablet (10 mg total) by mouth daily for 10 days 10 tablet 0   • HYDROcodone-acetaminophen (NORCO) 5-325 mg per tablet Take 1 PO BID PRN for pain  DO NOT FILL UNTIL: 6/14/23 (Patient not taking: Reported on 5/16/2023) 60 tablet 0   • Lidocaine Viscous HCl (XYLOCAINE) 2 % mucosal solution Swish and spit 15 mL 4 (four) times a day as needed for mouth pain or discomfort Gargle and spit 15mL 4x a day as needed for sore throat (Patient not taking: Reported on 4/14/2023) 100 mL 0   • naloxone (NARCAN) 4 mg/0 1 mL nasal spray Administer 1 spray into a nostril  If no response after 2-3 minutes, give another dose in the other nostril using a new spray  (Patient not taking: Reported on 5/16/2023) 1 each 1     No current facility-administered medications for this visit       No "Known Allergies    Objective   Vitals: Blood pressure 132/82, pulse (!) 106, height 5' 7\" (1 702 m), weight 118 kg (260 lb 6 4 oz)  Physical Exam  Vitals and nursing note reviewed  Constitutional:       General: He is not in acute distress  Appearance: Normal appearance  He is not diaphoretic  HENT:      Head: Normocephalic and atraumatic  Eyes:      General: No scleral icterus  Extraocular Movements: Extraocular movements intact  Conjunctiva/sclera: Conjunctivae normal       Pupils: Pupils are equal, round, and reactive to light  Cardiovascular:      Rate and Rhythm: Normal rate and regular rhythm  Heart sounds: No murmur heard  Pulmonary:      Effort: Pulmonary effort is normal  No respiratory distress  Breath sounds: Normal breath sounds  No wheezing  Musculoskeletal:      Cervical back: Normal range of motion  Right lower leg: No edema  Left lower leg: No edema  Lymphadenopathy:      Cervical: No cervical adenopathy  Skin:     General: Skin is warm and dry  Neurological:      Mental Status: He is alert and oriented to person, place, and time  Mental status is at baseline  Sensory: No sensory deficit  Gait: Gait normal    Psychiatric:         Mood and Affect: Mood normal          Behavior: Behavior normal          Thought Content: Thought content normal      Patient's shoes and socks were not removed  The history was obtained from the review of the chart, patient      Lab Results:   Lab Results   Component Value Date/Time    Hemoglobin A1C 7 2 04/06/2023 12:00 AM    Hemoglobin A1C 6 6 (H) 10/24/2022 07:12 AM    BUN 28 (H) 10/24/2022 07:12 AM    Potassium 4 4 10/24/2022 07:12 AM    Chloride 104 10/24/2022 07:12 AM    CO2 27 10/24/2022 07:12 AM    Creatinine 0 85 10/24/2022 07:12 AM    AST 20 10/24/2022 07:12 AM    ALT 20 10/24/2022 07:12 AM    Albumin 4 1 10/24/2022 07:12 AM    Globulin 2 7 10/24/2022 07:12 AM         Portions of the record " "may have been created with voice recognition software  Occasional wrong word or \"sound a like\" substitutions may have occurred due to the inherent limitations of voice recognition software  Read the chart carefully and recognize, using context, where substitutions have occurred    "

## 2023-05-16 NOTE — LETTER
Diabetic Eye Exam Form    Date Requested: 23  Patient: Lorie Ellis  Patient : 1965   Referring Provider: Bina Espinal Exam Date _______________________________      Type of Exam MUST be documented for Diabetic Eye Exams  Please CHECK ONE  Retinal Exam       Dilated Retinal Exam       OCT       Optomap-Iris Exam      Fundus Photography       Left Eye - Please check Retinopathy or No Retinopathy        Exam did show retinopathy    Exam did not show retinopathy       Right Eye - Please check Retinopathy or No Retinopathy       Exam did show retinopathy    Exam did not show retinopathy       Comments __________________________________________________________    Practice Providing Exam ______________________________________________    Exam Performed By (print name) _______________________________________      Provider Signature ___________________________________________________      These reports are needed for  compliance  Please fax this completed form and a copy of the Diabetic Eye Exam report to our office located at [place] [address] as soon as possible via Fax [phone] jeannette Meadows: Phone [phone]  We thank you for your assistance in treating our mutual patient

## 2023-05-16 NOTE — PATIENT INSTRUCTIONS
Continue to monitor diet, maintain physical activity  Make sure to drink plenty of water throughout the day  Continue with current diabetes medications  Start Ozempic 0 25 mg weekly  Increase to 0 5 after 4 weeks  Continue with simvastatin for hyperlipidemia  Complete blood sugar log for 2 weeks and send into the office  Get lab work completed prior to 6 month follow-up

## 2023-05-16 NOTE — TELEPHONE ENCOUNTER
Upon review of the In Basket request and the patient's chart, initial outreach has been made via fax to facility  Please see Contacts section for details       Thank you  Cam Davis

## 2023-05-16 NOTE — TELEPHONE ENCOUNTER
----- Message from [address] [facility] sent at 5/16/2023  7:33 AM EDT -----  Regarding: DM EYE EXAM  05/16/23 7:34 AM    Hello, our patient Asiya Martinez  has had a DM Eye Exam performed at [facility] in [facility]  Their number is [phone]      Thank you,  [facility] [place]

## 2023-05-17 ENCOUNTER — TELEPHONE (OUTPATIENT)
Dept: PAIN MEDICINE | Facility: CLINIC | Age: 58
End: 2023-05-17

## 2023-05-17 NOTE — TELEPHONE ENCOUNTER
Pt was discharged from our office on 4/24/23 for 2 hot urine tests    Pt is calling to ask if he can be apart of our office again? Pt states that he was with [facility]o for 10 plus years and never had an issue before    Pt also states that [facility] gave him a refill to hold him over but 2 Pharmacy's could not fill the scripts  He found that [address] can fill the script   Pt is requesting updated refill for new Pharmacy    Please advise

## 2023-05-17 NOTE — TELEPHONE ENCOUNTER
Upon review of the In Basket request we were able to locate, review, and update the patient chart as requested for Diabetic Eye Exam     Any additional questions or concerns should be emailed to the Practice Liaisons via the appropriate education email address, please do not reply via In Basket      Thank you  Cristino Jaffe

## 2023-05-17 NOTE — TELEPHONE ENCOUNTER
S/W Pt to let him know that we will not refill his meds BC he is Discharged from our office  I let him know that he can call his PCP about taking over his meds  He was upset about it      Thank You

## 2023-05-22 ENCOUNTER — OFFICE VISIT (OUTPATIENT)
Dept: PHYSICAL THERAPY | Facility: CLINIC | Age: 58
End: 2023-05-22

## 2023-05-22 DIAGNOSIS — M48.062 SPINAL STENOSIS OF LUMBAR REGION WITH NEUROGENIC CLAUDICATION: ICD-10-CM

## 2023-05-22 DIAGNOSIS — M47.816 LUMBAR SPONDYLOSIS: ICD-10-CM

## 2023-05-22 DIAGNOSIS — M54.50 CHRONIC BILATERAL LOW BACK PAIN WITHOUT SCIATICA: ICD-10-CM

## 2023-05-22 DIAGNOSIS — G89.29 CHRONIC BILATERAL LOW BACK PAIN WITHOUT SCIATICA: ICD-10-CM

## 2023-05-22 DIAGNOSIS — M51.26 HERNIATED LUMBAR DISC WITHOUT MYELOPATHY: Primary | ICD-10-CM

## 2023-05-22 NOTE — PROGRESS NOTES
"Daily Note     Today's date: 2023  Patient name: Tamica Kaur  : 1965  MRN: [id number]  Referring provider: NIKKO Torres  Dx:   Encounter Diagnosis     ICD-10-CM    1  Herniated lumbar disc without myelopathy  M51 26       2  Spinal stenosis of lumbar region with neurogenic claudication  M48 062       3  Chronic bilateral low back pain without sciatica  M54 50     G89 29       4  Lumbar spondylosis  M47 816           Subjective: Patient reports his back doesn't feel too bad  Notes his hip has been feeling better in the past week  Objective: See treatment diary below      Assessment: Patient tolerated treatment well  Continues to have limited lumbopelvic mobility that based on observation is improving  Added BLE strengthening today for hips and knees for ambulation  He will benefit from continued skilled PT to address impairments and return to maximal function      Plan: Progress treatment as tolerated         Precautions: T2 DM, HTN, hyperlipidemia, PWB on R foot for 2 weeks  Primary impairments: limited lumbar ROM, limited hip flexion ROM, (B) hip and knee weakness   Functional limitation: walking or standing for long periods, stairs, performing household chores  POC expiration: 23  [employer] Code: [email or web address]       Manuals          L Femoacetabular joint AP mobs   G3-4 4'          Rom stretch b/l   NS 4'                          8'          Neuro Re-Ed             SAQ iso   x20 ea 3\" hold 2x10 ea 3\" hold         TvA iso   10x10\"                                                                           Ther Ex             Prone on elbows             Repeated lumbar extension standing HEP            Seated hamstring stretch HEP            LTR   5\"x10 ea 10\"x10 ea 5\"x10 ea         DKTC with pball under legs for AAROM  2x10  2x10         bridge  10x  10x         Seated lumbar flexion  10x  10x         3 way pball rollout  5\"x5ea  5x5\" ea         LAQ    2# 2x10 3\" " "hold         hooklying add    10\"x10         hooklying abd    10\"x10                      Ther Activity             TvA activation in supine laying on heat   8' with diaphragmatic breathing, 10\" TvA holds          Pt education: pathoanatomy, nature of sxs, POC, HEP   7' NS          Gait Training                                       Modalities                                            "

## 2023-05-26 ENCOUNTER — OFFICE VISIT (OUTPATIENT)
Dept: PHYSICAL THERAPY | Facility: CLINIC | Age: 58
End: 2023-05-26

## 2023-05-26 DIAGNOSIS — G89.29 CHRONIC BILATERAL LOW BACK PAIN WITHOUT SCIATICA: ICD-10-CM

## 2023-05-26 DIAGNOSIS — M48.062 SPINAL STENOSIS OF LUMBAR REGION WITH NEUROGENIC CLAUDICATION: ICD-10-CM

## 2023-05-26 DIAGNOSIS — M54.50 CHRONIC BILATERAL LOW BACK PAIN WITHOUT SCIATICA: ICD-10-CM

## 2023-05-26 DIAGNOSIS — M47.816 LUMBAR SPONDYLOSIS: ICD-10-CM

## 2023-05-26 DIAGNOSIS — M51.26 HERNIATED LUMBAR DISC WITHOUT MYELOPATHY: Primary | ICD-10-CM

## 2023-05-26 NOTE — PROGRESS NOTES
"Daily Note     Today's date: 2023  Patient name: Gail Redman  : 1965  MRN: [id number]  Referring provider: NIKKO Story  Dx:   Encounter Diagnosis     ICD-10-CM    1  Herniated lumbar disc without myelopathy  M51 26       2  Spinal stenosis of lumbar region with neurogenic claudication  M48 062       3  Chronic bilateral low back pain without sciatica  M54 50     G89 29       4  Lumbar spondylosis  M47 816           Subjective: Patient reports left hip is feeling good and back hasn't been feeling bad  He notes being able to do the exercises more often  Objective: See treatment diary below      Assessment: Patient tolerated treatment well  Observed continued limited lumbar mobility  Able to progress BLE strength today with SLR and increased resistance of SAQ  HE reported fatigue post treatment  He will benefit from continued skilled PT to address BLE strength and lumbar mobility and return to maximum function      Plan: Progress treatment as tolerated         Precautions: T2 DM, HTN, hyperlipidemia, PWB on R foot for 2 weeks  Primary impairments: limited lumbar ROM, limited hip flexion ROM, (B) hip and knee weakness   Functional limitation: walking or standing for long periods, stairs, performing household chores  POC expiration: 23  [employer] Code: [email or web address]       Manuals         L Femoacetabular joint AP mobs   G3-4 4'          Rom stretch b/l   NS 4'                          8'          Neuro Re-Ed             SAQ iso   x20 ea 3\" hold 2x10 ea 3\" hold         TvA iso   10x10\"                                                                           Ther Ex             Prone on elbows             Repeated lumbar extension standing HEP            Seated hamstring stretch HEP            LTR   5\"x10 ea 10\"x10 ea 5\"x10 ea 5\"x10ea        DKTC with pball under legs for AAROM  2x10  2x10 2x10        bridge  10x  10x 2x10        Seated lumbar flexion  10x  10x " "10x        3 way pball rollout  5\"x5ea  5x5\" ea 5x5\" ea        LAQ    2# 2x10 3\" hold         hooklying add    10\"x10 10\"x10        hooklying abd    10\"x10 10\"x10        SAQ     2# 2x10        SLR     10ea        Ther Activity             TvA activation in supine laying on heat   8' with diaphragmatic breathing, 10\" TvA holds          Pt education: pathoanatomy, nature of sxs, POC, HEP   7' NS          Gait Training                                       Modalities                                            "

## 2023-05-30 ENCOUNTER — OFFICE VISIT (OUTPATIENT)
Dept: PHYSICAL THERAPY | Facility: CLINIC | Age: 58
End: 2023-05-30

## 2023-05-30 DIAGNOSIS — M48.062 SPINAL STENOSIS OF LUMBAR REGION WITH NEUROGENIC CLAUDICATION: ICD-10-CM

## 2023-05-30 DIAGNOSIS — G89.29 CHRONIC BILATERAL LOW BACK PAIN WITHOUT SCIATICA: ICD-10-CM

## 2023-05-30 DIAGNOSIS — M51.26 HERNIATED LUMBAR DISC WITHOUT MYELOPATHY: Primary | ICD-10-CM

## 2023-05-30 DIAGNOSIS — M54.50 CHRONIC BILATERAL LOW BACK PAIN WITHOUT SCIATICA: ICD-10-CM

## 2023-05-30 DIAGNOSIS — M47.816 LUMBAR SPONDYLOSIS: ICD-10-CM

## 2023-05-30 NOTE — PROGRESS NOTES
"Daily Note   Addendum 23: Discharged to independent HEP    Today's date: 2023  Patient name: Giovanni Calderon  : 1965  MRN: [id number]  Referring provider: NIKKO Meyer  Dx:   Encounter Diagnosis     ICD-10-CM    1  Herniated lumbar disc without myelopathy  M51 26       2  Spinal stenosis of lumbar region with neurogenic claudication  M48 062       3  Chronic bilateral low back pain without sciatica  M54 50     G89 29       4  Lumbar spondylosis  M47 816           Subjective: Patient reports low back and left hip feeling good overall  Primary concern is balance when standing up and when he closes his eyes in the shower  Objective: See treatment diary below      Assessment: Patient tolerated treatment well  Continues to have deficits with lumbar mobility and BLE strength that is progressing well  Is still unable to perform standing exercises due to precautions for R foot  Discussed holding PT until able to perform more activities weight bearing - will discuss this again next visit  Plan: Progress treatment as tolerated         Precautions: T2 DM, HTN, hyperlipidemia, PWB on R foot for 2 weeks  Primary impairments: limited lumbar ROM, limited hip flexion ROM, (B) hip and knee weakness   Functional limitation: walking or standing for long periods, stairs, performing household chores  POC expiration: 23  [facility] Code: [email or web address]       Manuals        L Femoacetabular joint AP mobs   G3-4 4'          Rom stretch b/l   NS 4'                          8'          Neuro Re-Ed             SAQ iso   x20 ea 3\" hold 2x10 ea 3\" hold         TvA iso   10x10\"                                                                           Ther Ex             Prone on elbows             Repeated lumbar extension standing HEP            Seated hamstring stretch HEP            LTR   5\"x10 ea 10\"x10 ea 5\"x10 ea 5\"x10ea 5\"x10ea       DKTC with pball under legs for AAROM  " "2x10  2x10 2x10 2x10       bridge  10x  10x 2x10 2x10       Seated lumbar flexion  10x  10x 10x        3 way pball rollout  5\"x5ea  5x5\" ea 5x5\" ea 5x5\" ea       LAQ    2# 2x10 3\" hold  2# 2x10       hooklying add    10\"x10 10\"x10 10\"x10       hooklying abd    10\"x10 10\"x10 10\"x10       SAQ     2# 2x10 2# 2x10       SLR     10ea 10ea       Ther Activity             TvA activation in supine laying on heat   8' with diaphragmatic breathing, 10\" TvA holds          Pt education: pathoanatomy, nature of sxs, POC, HEP   7' NS          Gait Training                                       Modalities                                            "

## 2023-07-25 ENCOUNTER — TELEPHONE (OUTPATIENT)
Dept: ENDOCRINOLOGY | Facility: HOSPITAL | Age: 58
End: 2023-07-25

## 2023-07-25 NOTE — TELEPHONE ENCOUNTER
Patient just came home from [facility]. Was in hospital and then had surgery for dislocation in foot. Karen the [facility] health nurse called to verify his med dosages. She needs to know the dosages of the Novolog and the Levemir. Please call her back at [phone] 8 am to 5 pm or fax orders to office [phone].   Thanks

## 2023-07-26 NOTE — TELEPHONE ENCOUNTER
Spoke with patient, he is home since Saturday. Is taking Levemir 40 units at night, for meals he does not take Novolog unless his blood sugar is over 200 and he will take 10-20 units, is still taking Ozempic 0.5mg weekly and glipizide. Patient reports glucose levels have been good, have not been over 176, ranging 130-140.

## 2023-07-26 NOTE — TELEPHONE ENCOUNTER
Spoke to patient. He requested that we mail this information to him. I advised him that it may take several days to get this in the mail and he said that it was fine. Mailed changes to patient.

## 2023-07-26 NOTE — TELEPHONE ENCOUNTER
Okay, I would recommend continuing Levemir 40 units once daily, Ozempic 0.5 mg once a week, and glipizide 5 mg 2 tablets twice a day. Regarding the NovoLog, I would recommend a sliding scale for now for sugars over 200: 200-250 take 5 units, 251-300 take 10 units, 301-3 50 take 15 units, over 350 take 20 units. I assume he is no longer on his metformin. I would like to see a blood sugar record within the next several weeks checked 4 times a day, before each meal and bedtime so that we can make adjustments as needed. Once we confirm all this with the patient, we can send this information to the home care nurse.

## 2023-10-31 DIAGNOSIS — G89.29 CHRONIC BILATERAL LOW BACK PAIN WITHOUT SCIATICA: ICD-10-CM

## 2023-10-31 DIAGNOSIS — M51.9 INTERVERTEBRAL DISC DISORDER: ICD-10-CM

## 2023-10-31 DIAGNOSIS — G62.9 POLYNEUROPATHY: ICD-10-CM

## 2023-10-31 DIAGNOSIS — M48.062 SPINAL STENOSIS OF LUMBAR REGION WITH NEUROGENIC CLAUDICATION: ICD-10-CM

## 2023-10-31 DIAGNOSIS — M51.26 HERNIATED LUMBAR DISC WITHOUT MYELOPATHY: ICD-10-CM

## 2023-10-31 DIAGNOSIS — M47.816 LUMBAR SPONDYLOSIS: ICD-10-CM

## 2023-10-31 DIAGNOSIS — M54.50 CHRONIC BILATERAL LOW BACK PAIN WITHOUT SCIATICA: ICD-10-CM

## 2023-10-31 DIAGNOSIS — G89.4 CHRONIC PAIN SYNDROME: ICD-10-CM

## 2023-11-01 RX ORDER — GABAPENTIN 400 MG/1
CAPSULE ORAL
Qty: 360 CAPSULE | Refills: 3 | OUTPATIENT
Start: 2023-11-01

## 2023-11-08 ENCOUNTER — DOCUMENTATION (OUTPATIENT)
Dept: ENDOCRINOLOGY | Facility: HOSPITAL | Age: 58
End: 2023-11-08

## 2023-11-09 LAB
CREAT ?TM UR-SCNC: 163 UMOL/L
EXT ALBUMIN URINE RANDOM: 11.9
HBA1C MFR BLD HPLC: 7.6 %
MICROALBUMIN/CREAT UR: 73 MG/G{CREAT}

## 2023-12-12 DIAGNOSIS — E11.65 TYPE 2 DIABETES MELLITUS WITH HYPERGLYCEMIA, WITH LONG-TERM CURRENT USE OF INSULIN (HCC): ICD-10-CM

## 2023-12-12 DIAGNOSIS — Z79.4 TYPE 2 DIABETES MELLITUS WITH HYPERGLYCEMIA, WITH LONG-TERM CURRENT USE OF INSULIN (HCC): ICD-10-CM

## 2023-12-12 NOTE — TELEPHONE ENCOUNTER
Requested medication(s) are due for refill today: Yes  Patient has already received a courtesy refill: No  Other reason request has been forwarded to provider: guidelines not met

## 2024-03-12 LAB
LEFT EYE DIABETIC RETINOPATHY: POSITIVE
RIGHT EYE DIABETIC RETINOPATHY: POSITIVE

## 2024-03-13 ENCOUNTER — VBI (OUTPATIENT)
Dept: ADMINISTRATIVE | Facility: OTHER | Age: 59
End: 2024-03-13

## 2024-03-13 NOTE — TELEPHONE ENCOUNTER
Upon review of the In Basket request we were able to locate, review, and update the patient chart as requested for Diabetic Eye Exam.    Any additional questions or concerns should be emailed to the Practice Liaisons via the appropriate education email address, please do not reply via In Basket.    Thank you  Monique Meneses

## 2024-03-26 ENCOUNTER — HOSPITAL ENCOUNTER (EMERGENCY)
Facility: HOSPITAL | Age: 59
Discharge: HOME | End: 2024-03-26
Attending: EMERGENCY MEDICINE
Payer: COMMERCIAL

## 2024-03-26 ENCOUNTER — APPOINTMENT (EMERGENCY)
Dept: CT IMAGING | Facility: HOSPITAL | Age: 59
End: 2024-03-26
Payer: COMMERCIAL

## 2024-03-26 VITALS
RESPIRATION RATE: 22 BRPM | DIASTOLIC BLOOD PRESSURE: 83 MMHG | OXYGEN SATURATION: 95 % | SYSTOLIC BLOOD PRESSURE: 153 MMHG | HEART RATE: 104 BPM | TEMPERATURE: 98 F

## 2024-03-26 DIAGNOSIS — N39.0 URINARY TRACT INFECTION: Primary | ICD-10-CM

## 2024-03-26 LAB
ALBUMIN SERPL BCP-MCNC: 3.9 G/DL (ref 3.5–5)
ALP SERPL-CCNC: 64 U/L (ref 34–104)
ALT SERPL W P-5'-P-CCNC: 11 U/L (ref 7–52)
ANION GAP SERPL CALCULATED.3IONS-SCNC: 11 MMOL/L (ref 4–13)
AST SERPL W P-5'-P-CCNC: 12 U/L (ref 13–39)
BACTERIA UR QL AUTO: ABNORMAL /HPF
BASOPHILS # BLD AUTO: 0.07 THOUSANDS/ÂΜL (ref 0–0.1)
BASOPHILS NFR BLD AUTO: 0 % (ref 0–1)
BILIRUB SERPL-MCNC: 0.41 MG/DL (ref 0.2–1)
BILIRUB UR QL STRIP: NEGATIVE
BUN SERPL-MCNC: 18 MG/DL (ref 5–25)
CALCIUM SERPL-MCNC: 9.1 MG/DL (ref 8.4–10.2)
CHLORIDE SERPL-SCNC: 98 MMOL/L (ref 96–108)
CLARITY UR: ABNORMAL
CO2 SERPL-SCNC: 26 MMOL/L (ref 21–32)
COLOR UR: YELLOW
CREAT SERPL-MCNC: 0.9 MG/DL (ref 0.6–1.3)
EOSINOPHIL # BLD AUTO: 0.21 THOUSAND/ÂΜL (ref 0–0.61)
EOSINOPHIL NFR BLD AUTO: 1 % (ref 0–6)
ERYTHROCYTE [DISTWIDTH] IN BLOOD BY AUTOMATED COUNT: 15.4 % (ref 11.6–15.1)
GFR SERPL CREATININE-BSD FRML MDRD: 93 ML/MIN/1.73SQ M
GLUCOSE SERPL-MCNC: 214 MG/DL (ref 65–140)
GLUCOSE UR STRIP-MCNC: ABNORMAL MG/DL
HCT VFR BLD AUTO: 35.6 % (ref 36.5–49.3)
HGB BLD-MCNC: 11.2 G/DL (ref 12–17)
HGB UR QL STRIP.AUTO: ABNORMAL
IMM GRANULOCYTES # BLD AUTO: 0.14 THOUSAND/UL (ref 0–0.2)
IMM GRANULOCYTES NFR BLD AUTO: 1 % (ref 0–2)
KETONES UR STRIP-MCNC: NEGATIVE MG/DL
LEUKOCYTE ESTERASE UR QL STRIP: NEGATIVE
LIPASE SERPL-CCNC: 6 U/L (ref 11–82)
LYMPHOCYTES # BLD AUTO: 2.71 THOUSANDS/ÂΜL (ref 0.6–4.47)
LYMPHOCYTES NFR BLD AUTO: 17 % (ref 14–44)
MCH RBC QN AUTO: 25.4 PG (ref 26.8–34.3)
MCHC RBC AUTO-ENTMCNC: 31.5 G/DL (ref 31.4–37.4)
MCV RBC AUTO: 81 FL (ref 82–98)
MONOCYTES # BLD AUTO: 1.46 THOUSAND/ÂΜL (ref 0.17–1.22)
MONOCYTES NFR BLD AUTO: 9 % (ref 4–12)
MUCOUS THREADS UR QL AUTO: ABNORMAL
NEUTROPHILS # BLD AUTO: 11.73 THOUSANDS/ÂΜL (ref 1.85–7.62)
NEUTS SEG NFR BLD AUTO: 72 % (ref 43–75)
NITRITE UR QL STRIP: NEGATIVE
NON-SQ EPI CELLS URNS QL MICRO: ABNORMAL /HPF
NRBC BLD AUTO-RTO: 0 /100 WBCS
PH UR STRIP.AUTO: 6 [PH]
PLATELET # BLD AUTO: 425 THOUSANDS/UL (ref 149–390)
PMV BLD AUTO: 10.2 FL (ref 8.9–12.7)
POTASSIUM SERPL-SCNC: 3.7 MMOL/L (ref 3.5–5.3)
PROT SERPL-MCNC: 8.1 G/DL (ref 6.4–8.4)
PROT UR STRIP-MCNC: ABNORMAL MG/DL
RBC # BLD AUTO: 4.41 MILLION/UL (ref 3.88–5.62)
RBC #/AREA URNS AUTO: ABNORMAL /HPF
SODIUM SERPL-SCNC: 135 MMOL/L (ref 135–147)
SP GR UR STRIP.AUTO: 1.02 (ref 1–1.03)
UROBILINOGEN UR STRIP-ACNC: <2 MG/DL
WBC # BLD AUTO: 16.32 THOUSAND/UL (ref 4.31–10.16)
WBC #/AREA URNS AUTO: ABNORMAL /HPF

## 2024-03-26 PROCEDURE — 99283 EMERGENCY DEPT VISIT LOW MDM: CPT

## 2024-03-26 PROCEDURE — 85025 COMPLETE CBC W/AUTO DIFF WBC: CPT | Performed by: EMERGENCY MEDICINE

## 2024-03-26 PROCEDURE — 36415 COLL VENOUS BLD VENIPUNCTURE: CPT | Performed by: EMERGENCY MEDICINE

## 2024-03-26 PROCEDURE — 51798 US URINE CAPACITY MEASURE: CPT

## 2024-03-26 PROCEDURE — 74177 CT ABD & PELVIS W/CONTRAST: CPT

## 2024-03-26 PROCEDURE — 81001 URINALYSIS AUTO W/SCOPE: CPT | Performed by: EMERGENCY MEDICINE

## 2024-03-26 PROCEDURE — 83690 ASSAY OF LIPASE: CPT | Performed by: EMERGENCY MEDICINE

## 2024-03-26 PROCEDURE — 99284 EMERGENCY DEPT VISIT MOD MDM: CPT | Performed by: EMERGENCY MEDICINE

## 2024-03-26 PROCEDURE — 80053 COMPREHEN METABOLIC PANEL: CPT | Performed by: EMERGENCY MEDICINE

## 2024-03-26 PROCEDURE — 96365 THER/PROPH/DIAG IV INF INIT: CPT

## 2024-03-26 RX ORDER — OXYCODONE HCL 10 MG/1
10 TABLET, FILM COATED, EXTENDED RELEASE ORAL EVERY 12 HOURS SCHEDULED
COMMUNITY

## 2024-03-26 RX ORDER — METOPROLOL SUCCINATE 25 MG/1
25 TABLET, EXTENDED RELEASE ORAL DAILY
COMMUNITY

## 2024-03-26 RX ORDER — CEFTRIAXONE 1 G/50ML
1000 INJECTION, SOLUTION INTRAVENOUS ONCE
Status: COMPLETED | OUTPATIENT
Start: 2024-03-26 | End: 2024-03-26

## 2024-03-26 RX ORDER — CEFUROXIME AXETIL 500 MG/1
500 TABLET ORAL EVERY 12 HOURS SCHEDULED
Qty: 20 TABLET | Refills: 0 | Status: SHIPPED | OUTPATIENT
Start: 2024-03-26 | End: 2024-04-05

## 2024-03-26 RX ADMIN — IOHEXOL 100 ML: 350 INJECTION, SOLUTION INTRAVENOUS at 18:25

## 2024-03-26 RX ADMIN — CEFTRIAXONE 1000 MG: 1 INJECTION, SOLUTION INTRAVENOUS at 19:58

## 2024-03-26 NOTE — ED PROVIDER NOTES
History  Chief Complaint   Patient presents with    Possible UTI     Pt reports painful urination that started yesterday. States that today he noted blood in his urine. Also states that he has not been able to pass gas all day, and he has abdominal pain. Was placed on abx 2 days ago.      58-year-old male, presents with pain on urination for the past 2 days.  Patient states that he noticed small amounts of blood in his urine.  Patient states he is urinating frequently and only small amounts.  Also feels that he is constipated with increased pressure and discomfort in abdomen.  Denies any fevers.  Is currently on ciprofloxacin recently prescribed for possible UTI.      History provided by:  Patient   used: No        Prior to Admission Medications   Prescriptions Last Dose Informant Patient Reported? Taking?   Accu-Chek Softclix Lancets lancets  Self No No   Sig: Check glucose 6 times daily   Aspirin Low Dose 81 MG chewable tablet  Self Yes No   Sig: Chew 81 mg daily   B-D UF III MINI PEN NEEDLES 31G X 5 MM MISC   No No   Sig: USE 4 PEN NEEDLES DAILY   Blood Glucose Monitoring Suppl (Accu-Chek Guide) w/Device KIT  Self No No   Sig: Use in the morning   HYDROcodone-acetaminophen (NORCO) 5-325 mg per tablet  Self No No   Sig: Take 1 PO BID PRN for pain. DO NOT FILL UNTIL: 6/14/23   Patient not taking: Reported on 5/16/2023   HYDROcodone-acetaminophen (NORCO) 7.5-325 mg per tablet  Self No No   Sig: Take 1 PO TID PRN for pain. DO NOT FILL UNTIL: 5/17/23   Insulin Pen Needle 31G X 6 MM MISC  Self No No   Sig: Use twice daily   Lidocaine Viscous HCl (XYLOCAINE) 2 % mucosal solution  Self No No   Sig: Swish and spit 15 mL 4 (four) times a day as needed for mouth pain or discomfort Gargle and spit 15mL 4x a day as needed for sore throat   Patient not taking: Reported on 4/14/2023   albuterol (PROVENTIL HFA,VENTOLIN HFA) 90 mcg/act inhaler  Self Yes No   amoxicillin-clavulanate (AUGMENTIN) 875-125 mg per  tablet  Self Yes No   Sig: Take 1 tablet by mouth 2 (two) times a day   Patient not taking: Reported on 2023   cetirizine (ZyrTEC) 10 mg tablet   No No   Sig: Take 1 tablet (10 mg total) by mouth daily for 10 days   cilostazol (PLETAL) 100 mg tablet  Self Yes No   Sig: Take 100 mg by mouth 2 (two) times a day   finasteride (PROSCAR) 5 mg tablet  Self Yes No   Sig: Take 5 mg by mouth daily   fluticasone (FLONASE) 50 mcg/act nasal spray  Self No No   Si spray into each nostril 2 (two) times a day   gabapentin (NEURONTIN) 400 mg capsule  Self No No   Sig: Take 2 PO BID.   glipiZIDE (GLUCOTROL) 5 mg tablet   No No   Sig: TAKE 2 TABLETS BY MOUTH TWICE A DAY   glucose blood (Accu-Chek Guide) test strip  Self No No   Sig: Check glucose 6 times daily   insulin aspart (NovoLOG FlexPen) 100 UNIT/ML injection pen   No No   Sig: INJECT INSULIN UTILIZING SLIDING SCALE PRIOR TO EACH MEAL, USING UP TO 60 UNITS DAILY.   insulin degludec (Tresiba FlexTouch) 100 units/mL injection pen   No No   Sig: Inject 80 Units under the skin daily   insulin detemir (LEVEMIR) 100 units/mL subcutaneous injection   Yes Yes   Sig: Inject 60 Units under the skin daily at bedtime   lisinopril-hydrochlorothiazide (PRINZIDE,ZESTORETIC) 10-12.5 MG per tablet  Self Yes No   Sig: Take 1 tablet by mouth daily     metFORMIN (GLUCOPHAGE) 1000 MG tablet  Self No No   Sig: TAKE 1 TABLET BY MOUTH TWICE A DAY WITH MEALS   metoprolol succinate (TOPROL-XL) 25 mg 24 hr tablet   Yes Yes   Sig: Take 25 mg by mouth daily   naloxone (NARCAN) 4 mg/0.1 mL nasal spray  Self No No   Sig: Administer 1 spray into a nostril. If no response after 2-3 minutes, give another dose in the other nostril using a new spray.   Patient not taking: Reported on 2023   oxyCODONE (OxyCONTIN) 10 mg 12 hr tablet   Yes Yes   Sig: Take 10 mg by mouth every 12 (twelve) hours   semaglutide, 0.25 or 0.5 mg/dose, (Ozempic, 0.25 or 0.5 MG/DOSE,) 2 mg/3 mL injection pen   No No   Sig:  "Inject 0.75 mL (0.5 mg total) under the skin every 7 days   simvastatin (ZOCOR) 20 mg tablet  Self Yes No   Sig: Take 20 mg by mouth        Facility-Administered Medications: None       Past Medical History:   Diagnosis Date    BPH (benign prostatic hyperplasia)     Lumbar herniated disc     Osteoarthritis        History reviewed. No pertinent surgical history.    Family History   Problem Relation Age of Onset    No Known Problems Mother     Diabetes unspecified Father     Heart disease Father         post CABG    Diabetes type II Father     No Known Problems Brother      I have reviewed and agree with the history as documented.    E-Cigarette/Vaping    E-Cigarette Use Never User      E-Cigarette/Vaping Substances     Social History     Tobacco Use    Smoking status: Every Day     Current packs/day: 1.00     Average packs/day: 1 pack/day for 40.0 years (40.0 ttl pk-yrs)     Types: Cigarettes    Smokeless tobacco: Never   Vaping Use    Vaping status: Never Used   Substance Use Topics    Alcohol use: Not Currently     Comment: \"Not much\" per pt    Drug use: Yes     Types: Marijuana     Comment: \"Not much\" per pt       Review of Systems   Constitutional: Negative.  Negative for fever.   Genitourinary:  Positive for dysuria and frequency.       Physical Exam  Physical Exam  Vitals and nursing note reviewed.   Constitutional:       General: He is not in acute distress.  HENT:      Head: Normocephalic and atraumatic.   Cardiovascular:      Rate and Rhythm: Tachycardia present.   Pulmonary:      Effort: Pulmonary effort is normal.   Abdominal:      Palpations: Abdomen is soft.      Comments: Diffusely distended, soft  Tenderness across lower abdomen, no rebound or guarding   Musculoskeletal:         General: Normal range of motion.      Comments: Left BKA, dressing and wound VAC in place.   Neurological:      Mental Status: He is alert.         Vital Signs  ED Triage Vitals   Temperature Pulse Respirations Blood Pressure " SpO2   03/26/24 1636 03/26/24 1636 03/26/24 1636 03/26/24 1636 03/26/24 1636   98 °F (36.7 °C) (!) 111 18 130/81 95 %      Temp Source Heart Rate Source Patient Position - Orthostatic VS BP Location FiO2 (%)   03/26/24 1636 03/26/24 1636 03/26/24 1636 03/26/24 1636 --   Temporal Monitor Sitting Left arm       Pain Score       03/26/24 2005       5           Vitals:    03/26/24 1636 03/26/24 1715 03/26/24 1730 03/26/24 2005   BP: 130/81 123/92 156/81 153/83   Pulse: (!) 111 102 103 104   Patient Position - Orthostatic VS: Sitting   Lying         Visual Acuity      ED Medications  Medications   iohexol (OMNIPAQUE) 350 MG/ML injection (MULTI-DOSE) 100 mL (100 mL Intravenous Given 3/26/24 1825)   cefTRIAXone (ROCEPHIN) IVPB (premix in dextrose) 1,000 mg 50 mL (1,000 mg Intravenous New Bag 3/26/24 1958)       Diagnostic Studies  Results Reviewed       Procedure Component Value Units Date/Time    Comprehensive metabolic panel [[id number]]  (Abnormal) Collected: 03/26/24 1747    Lab Status: Final result Specimen: Blood from Arm, Right Updated: 03/26/24 1807     Sodium 135 mmol/L      Potassium 3.7 mmol/L      Chloride 98 mmol/L      CO2 26 mmol/L      ANION GAP 11 mmol/L      BUN 18 mg/dL      Creatinine 0.90 mg/dL      Glucose 214 mg/dL      Calcium 9.1 mg/dL      AST 12 U/L      ALT 11 U/L      Alkaline Phosphatase 64 U/L      Total Protein 8.1 g/dL      Albumin 3.9 g/dL      Total Bilirubin 0.41 mg/dL      eGFR 93 ml/min/1.73sq m     Narrative:      National Kid[employer] Foundation guidelines for Chronic Kidney Disease (CKD):     Stage 1 with normal or high GFR (GFR > 90 mL/min/1.73 square meters)    Stage 2 Mild CKD (GFR = 60-89 mL/min/1.73 square meters)    Stage 3A Moderate CKD (GFR = 45-59 mL/min/1.73 square meters)    Stage 3B Moderate CKD (GFR = 30-44 mL/min/1.73 square meters)    Stage 4 Severe CKD (GFR = 15-29 mL/min/1.73 square meters)    Stage 5 End Stage CKD (GFR <15 mL/min/1.73 square meters)  Note: GFR  calculation is accurate only with a steady state creatinine    Lipase [[id number]]  (Abnormal) Collected: 03/26/24 1747    Lab Status: Final result Specimen: Blood from Arm, Right Updated: 03/26/24 1807     Lipase 6 u/L     Urine Microscopic [[id number]]  (Abnormal) Collected: 03/26/24 1707    Lab Status: Final result Specimen: Urine, Clean Catch Updated: 03/26/24 1806     RBC, UA 30-50 /hpf      WBC, UA 1-2 /hpf      Epithelial Cells Occasional /hpf      Bacteria, UA Occasional /hpf      MUCUS THREADS Occasional    Narrative:      Microscopic performed by Ct [facility].     CBC and differential [[id number]]  (Abnormal) Collected: 03/26/24 1747    Lab Status: Final result Specimen: Blood from Arm, Right Updated: 03/26/24 1753     WBC 16.32 Thousand/uL      RBC 4.41 Million/uL      Hemoglobin 11.2 g/dL      Hematocrit 35.6 %      MCV 81 fL      MCH 25.4 pg      MCHC 31.5 g/dL      RDW 15.4 %      MPV 10.2 fL      Platelets 425 Thousands/uL      nRBC 0 /100 WBCs      Neutrophils Relative 72 %      Immature Grans % 1 %      Lymphocytes Relative 17 %      Monocytes Relative 9 %      Eosinophils Relative 1 %      Basophils Relative 0 %      Neutrophils Absolute 11.73 Thousands/µL      Absolute Immature Grans 0.14 Thousand/uL      Absolute Lymphocytes 2.71 Thousands/µL      Absolute Monocytes 1.46 Thousand/µL      Eosinophils Absolute 0.21 Thousand/µL      Basophils Absolute 0.07 Thousands/µL     UA w Reflex to Microscopic w Reflex to Culture [[id number]]  (Abnormal) Collected: 03/26/24 1707    Lab Status: Final result Specimen: Urine, Clean Catch Updated: 03/26/24 1726     Color, UA Yellow     Clarity, UA Slightly Cloudy     Specific Gravity, UA 1.025     pH, UA 6.0     Leukocytes, UA Negative     Nitrite, UA Negative     Protein,  (3+) mg/dl      Glucose, UA 70 (7/100%) mg/dl      Ketones, UA Negative mg/dl      Urobilinogen, UA <2.0 mg/dl      Bilirubin, UA Negative     Occult Blood, UA Large                   CT  abdomen pelvis with contrast   Final Result by Ladarius Jones MD (03/26 1930)      1.  Findings consistent with ascending urinary tract infection involving the bladder and both ureters.   2.  Hepatomegaly.            The study was marked in EPIC for immediate notification.         Workstation performed: [id number]                    Procedures  Procedures         ED Course  ED Course as of 03/26/24 2029 Tue Mar 26, 2024   1724 Postvoid residual bladder scan performed by RN, no urine noted.   1940 Results reviewed and discussed with patient.  CT scan with findings of urinary tract infection.  Will give patient IV ceftriaxone ED, patient feels well to be discharged home, tolerating oral intake.   2029 Patient feels well to be discharged home, instructed to take antibiotics as prescribed, discontinue the Cipro that he was placed on.  Instructed to drink plenty fluids and follow-up for repeat evaluation.  Discussed with patient return emerged part for any worsening or new concerning symptoms.                               SBIRT 20yo+      Flowsheet Row Most Recent Value   Initial Alcohol Screen: US AUDIT-C     1. How often do you have a drink containing alcohol? 0 Filed at: 03/26/2024 1636   2. How many drinks containing alcohol do you have on a typical day you are drinking?  0 Filed at: 03/26/2024 1636   3a. Male UNDER 65: How often do you have five or more drinks on one occasion? 0 Filed at: 03/26/2024 1636   3b. FEMALE Any Age, or MALE 65+: How often do you have 4 or more drinks on one occassion? 0 Filed at: 03/26/2024 1636   Audit-C Score 0 Filed at: 03/26/2024 1636   ROBERT: How many times in the past year have you...    Used an illegal drug or used a prescription medication for non-medical reasons? Never Filed at: 03/26/2024 1636                      Medical Decision Making  58-year-old male, presents with dysuria and hematuria.  Differential diagnosis includes urinary tract infection, urinary retention, renal  colic among other diagnoses.  Labs were urinalysis, CT scan of abdomen ordered.    Amount and/or Complexity of Data Reviewed  Labs: ordered. Decision-making details documented in ED Course.  Radiology: ordered. Decision-making details documented in ED Course.    Risk  Prescription drug management.           I have reviewed test results and diagnosis with patient.  Follow-up plan reviewed.  Precautions for acute return for re-evaluation are reviewed.  Opportunity to ask questions was provided.  Patient verbalizes understanding.    Disposition  Final diagnoses:   Urinary tract infection     Time reflects when diagnosis was documented in both MDM as applicable and the Disposition within this note       Time User Action Codes Description Comment    3/26/2024  8:22 PM Judd Rdz Add [N39.0] Urinary tract infection           ED Disposition       ED Disposition   Discharge    Condition   Stable    Date/Time   Tue Mar 26, 2024 2022    Comment   Paxton Olson Jr. discharge to home/self care.                   Follow-up Information       Follow up With Specialties Details Why Contact Info    [facility]   [address] [address]  [phone]              Patient's Medications   Discharge Prescriptions    CEFUROXIME (CEFTIN) 500 MG TABLET    Take 1 tablet (500 mg total) by mouth every 12 (twelve) hours for 10 days       Start Date: 3/26/2024 End Date: 4/5/2024       Order Dose: 500 mg       Quantity: 20 tablet    Refills: 0       No discharge procedures on file.    PDMP Review         Value Time User    PDMP Reviewed  Yes 4/24/2023  4:18 PM NIKKO Caruso            ED Provider  Electronically Signed by             Judd Rdz MD  03/26/24 2030

## 2024-04-01 ENCOUNTER — OFFICE VISIT (OUTPATIENT)
Dept: GASTROENTEROLOGY | Facility: CLINIC | Age: 59
End: 2024-04-01
Payer: COMMERCIAL

## 2024-04-01 VITALS
HEIGHT: 67 IN | DIASTOLIC BLOOD PRESSURE: 72 MMHG | SYSTOLIC BLOOD PRESSURE: 106 MMHG | WEIGHT: 250 LBS | BODY MASS INDEX: 39.24 KG/M2

## 2024-04-01 DIAGNOSIS — Z79.4 TYPE 2 DIABETES MELLITUS WITH HYPERGLYCEMIA, WITH LONG-TERM CURRENT USE OF INSULIN (HCC): ICD-10-CM

## 2024-04-01 DIAGNOSIS — Z12.11 COLON CANCER SCREENING: ICD-10-CM

## 2024-04-01 DIAGNOSIS — E66.01 MORBID OBESITY WITH BMI OF 40.0-44.9, ADULT (HCC): ICD-10-CM

## 2024-04-01 DIAGNOSIS — E11.65 TYPE 2 DIABETES MELLITUS WITH HYPERGLYCEMIA, WITH LONG-TERM CURRENT USE OF INSULIN (HCC): ICD-10-CM

## 2024-04-01 DIAGNOSIS — K59.1 FUNCTIONAL DIARRHEA: Primary | ICD-10-CM

## 2024-04-01 PROCEDURE — 99204 OFFICE O/P NEW MOD 45 MIN: CPT | Performed by: INTERNAL MEDICINE

## 2024-04-01 RX ORDER — LISINOPRIL 10 MG/1
10 TABLET ORAL DAILY
COMMUNITY
Start: 2024-02-28

## 2024-04-01 RX ORDER — OMEPRAZOLE 20 MG/1
20 CAPSULE, DELAYED RELEASE ORAL DAILY
COMMUNITY

## 2024-04-01 RX ORDER — LORATADINE 10 MG/1
10 TABLET ORAL DAILY
COMMUNITY
Start: 2024-03-05

## 2024-04-01 RX ORDER — CHOLESTYRAMINE LIGHT 4 G/5.7G
4 POWDER, FOR SUSPENSION ORAL 2 TIMES DAILY
Qty: 60 EACH | Refills: 5 | Status: SHIPPED | OUTPATIENT
Start: 2024-04-01

## 2024-04-01 RX ORDER — CITALOPRAM 20 MG/1
TABLET ORAL
COMMUNITY
Start: 2024-02-21

## 2024-04-01 RX ORDER — FUROSEMIDE 40 MG/1
40 TABLET ORAL DAILY
COMMUNITY
Start: 2024-02-28

## 2024-04-01 NOTE — LETTER
April 1, 2024     Gunnar Marsh MD  [address] [address]    Patient: Paxton Olson Jr.   YOB: 1965   Date of Visit: 4/1/2024       Dear Dr. Marsh:    Thank you for referring Paxton Olson to me for evaluation. Below are my notes for this consultation.    If you have questions, please do not hesitate to call me. I look forward to following your patient along with you.         Sincerely,        Dulce Maria Hernández MD        CC: MD Dulce Maria Dumont MD  4/1/2024  3:08 PM  Sign when Signing Visit    [facility] - Outpatient Consultation  Paxton Olson Jr. 58 y.o. male MRN: [id number]  Encounter: [id number]    ASSESSMENT AND PLAN:      1. Functional diarrhea  This is a 58-year-old male here today at the request of his PCP for long history of diarrhea.  This has been going on for over 7 years.    Sounds like combination of diabetic neuropathy of the gut, medication side effects like Celexa and metformin, along with the fact that for the past several years he has been on and off antibiotics for his diabetic foot ulcer, eventually leading to an amputation.  He is currently on antibiotics again for his UTI.    -Emphasized importance of strict dairy free diet for the next 6 weeks  -Reviewed the colonoscopy from 2019.  Biopsies negative for microscopic colitis so we will hold off on repeating at this time.    - Stool Enteric Bacterial Panel by PCR; Future  - Clostridium difficile toxin by PCR with EIA; Future  - Pancreatic elastase, fecal; Future  - Calprotectin,Fecal; Future    -Start cholestyramine sugar free (QUESTRAN LIGHT) 4 g packet; Take 1 packet (4 g total) by mouth 2 (two) times a day  Dispense: 60 each; Refill: 5    2. Type 2 diabetes mellitus with hyperglycemia, with long-term current use of insulin (HCC)  Over 10 years of diabetes, on insulin and metformin.  Likely contributing to his GI symptoms.    3. Morbid obesity with BMI of 40.0-44.9,  adult ([facility])  Recommend weight loss.    4. Colon cancer screening  Up-to-date, recall 2029, earlier if the diarrhea persists      Followup Appointment: 2 months  ______________________________________________________________________    Chief Complaint   Patient presents with   • Diarrhea     Primarily at night, gaseous, bloated         HPI:   Paxton Olson Jr. is a 58 y.o. year old male who presents today at the request of his PCP for chronic diarrhea.  Patient states that this has been going on for over 7 years.  He is sick of it.  It is getting worse.    Baseline he moves his bowels 5-10 times a day.  Often has to wake up in the middle of the night.  Now is wearing depends.  Has been on and off antibiotics for his ulcer of the foot.  Eventually got his left foot amputated.  Currently on antibiotics for his UTI.  Was just in the hospital and CT reviewed which was negative.  Labs are otherwise okay.  Denies significant bleeding.  Weight is going up.  Admits that he does eat a lot of dairy products including milk and yogurt and ice cream.    Historical Information  Past Medical History:   Diagnosis Date   • Benign prostatic hyperplasia 12/13/2021   • BPH (benign prostatic hyperplasia)    • Diabetes mellitus ([facility])    • Diabetic polyneuropathy associated with type 2 diabetes mellitus ([facility]) 07/30/2018   • Herniated lumbar disc without myelopathy 11/25/2014   • Hyperlipidemia 07/30/2018   • Lumbar herniated disc    • Morbid obesity ([facility]) 12/13/2021   • Non-pressure chronic ulcer of other part of left foot with unspecified severity ([facility]) 12/13/2021   • Osteoarthritis    • Polyneuropathy 12/13/2021   • Type 2 diabetes mellitus with hyperglycemia, with long-term current use of insulin ([facility]) 07/30/2018     Past Surgical History:   Procedure Laterality Date   • BELOW KNEE LEG AMPUTATION Left    • COLONOSCOPY      approx 4-5 years ago     Social History     Substance and Sexual Activity   Alcohol Use Not Currently     "Comment: \"Not much\" per pt     Social History     Substance and Sexual Activity   Drug Use Yes   • Types: Marijuana    Comment: \"Not much\" per pt     Social History     Tobacco Use   Smoking Status Every Day   • Current packs/day: 1.00   • Average packs/day: 1 pack/day for 40.0 years (40.0 ttl pk-yrs)   • Types: Cigarettes   Smokeless Tobacco Never     Family History   Problem Relation Age of Onset   • No Known Problems Mother    • Diabetes unspecified Father    • Heart disease Father         post CABG   • Diabetes type II Father    • No Known Problems Brother        Meds/Allergies    Current Outpatient Medications:   •  Accu-Chek Softclix Lancets lancets  •  albuterol (PROVENTIL HFA,VENTOLIN HFA) 90 mcg/act inhaler  •  Aspirin Low Dose 81 MG chewable tablet  •  B-D UF III MINI PEN NEEDLES 31G X 5 MM MISC  •  Blood Glucose Monitoring Suppl (Accu-Chek Guide) w/Device KIT  •  cefuroxime (CEFTIN) 500 mg tablet  •  cholestyramine sugar free (QUESTRAN LIGHT) 4 g packet  •  cilostazol (PLETAL) 100 mg tablet  •  citalopram (CeleXA) 20 mg tablet  •  finasteride (PROSCAR) 5 mg tablet  •  fluticasone (FLONASE) 50 mcg/act nasal spray  •  furosemide (LASIX) 40 mg tablet  •  gabapentin (NEURONTIN) 400 mg capsule  •  glipiZIDE (GLUCOTROL) 5 mg tablet  •  glucose blood (Accu-Chek Guide) test strip  •  insulin aspart (NovoLOG FlexPen) 100 UNIT/ML injection pen  •  insulin detemir (LEVEMIR) 100 units/mL subcutaneous injection  •  Insulin Pen Needle 31G X 6 MM MISC  •  lisinopril (ZESTRIL) 10 mg tablet  •  loratadine (CLARITIN) 10 mg tablet  •  metFORMIN (GLUCOPHAGE) 1000 MG tablet  •  metoprolol succinate (TOPROL-XL) 25 mg 24 hr tablet  •  omeprazole (PriLOSEC) 20 mg delayed release capsule  •  oxyCODONE (OxyCONTIN) 10 mg 12 hr tablet  •  semaglutide, 0.25 or 0.5 mg/dose, (Ozempic, 0.25 or 0.5 MG/DOSE,) 2 mg/3 mL injection pen  •  simvastatin (ZOCOR) 20 mg tablet    No Known Allergies    PHYSICAL EXAM:    Blood pressure 106/72, " "height 5' 7\" (1.702 m), weight 113 kg (250 lb). Body mass index is 39.16 kg/m².  General Appearance: NAD, cooperative, alert  Eyes: Anicteric, conjunctiva pink   ENT:  Normocephalic, atraumatic, normal mucosa.    Neck:  Supple, symmetrical, trachea midline,   Resp:  Clear to auscultation bilaterally; no rales, rhonchi or wheezing; respirations unlabored   CV:  S1 S2, Regular rate and rhythm; no murmur, rub, or gallop.  GI:  Soft, non-tender, distended; normal bowel sounds; no masses, no organomegaly although exam limited by body habitus  Rectal: Deferred  Musculoskeletal: No cyanosis, clubbing or edema.  Limited range of motion.  Left BKA.  Skin:  No jaundice, rashes, or lesions   Psych: Normal affect, good eye contact  Neuro: No gross deficits, AAOx3    Lab Results:   Lab Results   Component Value Date    WBC 16.32 (H) 03/26/2024    HGB 11.2 (L) 03/26/2024    HCT 35.6 (L) 03/26/2024    MCV 81 (L) 03/26/2024     (H) 03/26/2024     Lab Results   Component Value Date    K 3.7 03/26/2024    CL 98 03/26/2024    CO2 26 03/26/2024    BUN 18 03/26/2024    CREATININE 0.90 03/26/2024    CALCIUM 9.1 03/26/2024    AST 12 (L) 03/26/2024    ALT 11 03/26/2024    ALKPHOS 64 03/26/2024    EGFR 93 03/26/2024         Radiology Results:   CT abdomen pelvis with contrast    Result Date: 3/26/2024  Narrative: CT ABDOMEN AND PELVIS WITH IV CONTRAST INDICATION: abdominal pain. COMPARISON: None. TECHNIQUE: CT examination of the abdomen and pelvis was performed. Multiplanar 2D reformatted images were created from the source data. This examination, like all CT scans performed in the [facility], was performed utilizing techniques to minimize radiation dose exposure, including the use of iterative reconstruction and automated exposure control. Radiation dose length product (DLP) for this visit: 1121.93 mGy-cm IV Contrast: 100 mL of iohexol (OMNIPAQUE) Enteric Contrast: Not administered. FINDINGS: ABDOMEN LOWER CHEST: No " clinically significant abnormality in the visualized lower chest. LIVER/BILIARY TREE: Hepatomegaly measuring 21.7 cm craniocaudal. GALLBLADDER: No calcified gallstones. No pericholecystic inflammatory change. SPLEEN: Unremarkable. PANCREAS: Unremarkable. ADRENAL GLANDS: Unremarkable. KIDNEYS/URETERS: Mild pelviectasis. No hydroureter. There is perinephric and periureteral fat stranding bilaterally with enhancement throughout the course of both ureters. No obstruction or stones visualized. This is contiguous with the bladder inflammation suspected reflect ascending infection. STOMACH AND BOWEL: Unremarkable. APPENDIX: Normal. ABDOMINOPELVIC CAVITY: Diffuse stranding and small amount of free fluid in the pelvis surrounding the bladder and tracking along the retroperitoneum. No pneumoperitoneum. Prominent lymph nodes throughout the pelvis bilaterally, likely reactive. VESSELS: Atherosclerosis without abdominal aortic aneurysm. PELVIS REPRODUCTIVE ORGANS: Unremarkable for patient's age. URINARY BLADDER: Circumferential wall thickening with perivesicular stranding. ABDOMINAL WALL/INGUINAL REGIONS: Unremarkable. BONES: No acute fracture or suspicious osseous lesion. Disc degeneration at L5-S1 with severe disc space narrowing and prominent endplate osteophytes.     Impression: 1.  Findings consistent with ascending urinary tract infection involving the bladder and both ureters. 2.  Hepatomegaly. The study was marked in EPIC for immediate notification. Workstation performed: [id number]         REVIEW OF SYSTEMS:    CONSTITUTIONAL: Denies any fever, chills, rigors, and weight loss.  HEENT: No earache or tinnitus. Denies hearing loss or visual disturbances.  CARDIOVASCULAR: No chest pain or palpitations.   RESPIRATORY: Denies any cough, hemoptysis, shortness of breath or dyspnea on exertion.  GASTROINTESTINAL: As noted in the History of Present Illness.   GENITOURINARY: No problems with urination. Denies any hematuria or  dysuria.  NEUROLOGIC: No dizziness or vertigo, denies headaches.   MUSCULOSKELETAL: Denies any muscle or joint pain.   SKIN: Denies skin rashes or itching.   ENDOCRINE: Denies excessive thirst. Denies intolerance to heat or cold.  PSYCHOSOCIAL: Denies depression or anxiety. Denies any recent memory loss.

## 2024-04-01 NOTE — PROGRESS NOTES
[facility], later [facility] - Outpatient Consultation  Paxton Olson Jr. 58 y.o. male MRN: [id number]  Encounter: [id number]    ASSESSMENT AND PLAN:      1. Functional diarrhea  This is a 58-year-old male here today at the request of his PCP for long history of diarrhea.  This has been going on for over 7 years.    Sounds like combination of diabetic neuropathy of the gut, medication side effects like Celexa and metformin, along with the fact that for the past several years he has been on and off antibiotics for his diabetic foot ulcer, eventually leading to an amputation.  He is currently on antibiotics again for his UTI.    -Emphasized importance of strict dairy free diet for the next 6 weeks  -Reviewed the colonoscopy from 2019.  Biopsies negative for microscopic colitis so we will hold off on repeating at this time.    - Stool Enteric Bacterial Panel by PCR; Future  - Clostridium difficile toxin by PCR with EIA; Future  - Pancreatic elastase, fecal; Future  - Calprotectin,Fecal; Future    -Start cholestyramine sugar free (QUESTRAN LIGHT) 4 g packet; Take 1 packet (4 g total) by mouth 2 (two) times a day  Dispense: 60 each; Refill: 5    2. Type 2 diabetes mellitus with hyperglycemia, with long-term current use of insulin ([facility])  Over 10 years of diabetes, on insulin and metformin.  Likely contributing to his GI symptoms.    3. Morbid obesity with BMI of 40.0-44.9, adult ([facility])  Recommend weight loss.    4. Colon cancer screening  Up-to-date, recall 2029, earlier if the diarrhea persists      Followup Appointment: 2 months  ______________________________________________________________________    Chief Complaint   Patient presents with   • Diarrhea     Primarily at night, gaseous, bloated         HPI:   Paxton Olson Jr. is a 58 y.o. year old male who presents today at the request of his PCP for chronic diarrhea.  Patient states that this has been going on for over 7 years.  He is sick of  "it.  It is getting worse.    Baseline he moves his bowels 5-10 times a day.  Often has to wake up in the middle of the night.  Now is wearing depends.  Has been on and off antibiotics for his ulcer of the foot.  Eventually got his left foot amputated.  Currently on antibiotics for his UTI.  Was just in the hospital and CT reviewed which was negative.  Labs are otherwise okay.  Denies significant bleeding.  Weight is going up.  Admits that he does eat a lot of dairy products including milk and yogurt and ice cream.    Historical Information   Past Medical History:   Diagnosis Date   • Benign prostatic hyperplasia 12/13/2021   • BPH (benign prostatic hyperplasia)    • Diabetes mellitus ([facility])    • Diabetic polyneuropathy associated with type 2 diabetes mellitus ([facility]) 07/30/2018   • Herniated lumbar disc without myelopathy 11/25/2014   • Hyperlipidemia 07/30/2018   • Lumbar herniated disc    • Morbid obesity ([facility]) 12/13/2021   • Non-pressure chronic ulcer of other part of left foot with unspecified severity ([facility]) 12/13/2021   • Osteoarthritis    • Polyneuropathy 12/13/2021   • Type 2 diabetes mellitus with hyperglycemia, with long-term current use of insulin ([facility]) 07/30/2018     Past Surgical History:   Procedure Laterality Date   • BELOW KNEE LEG AMPUTATION Left    • COLONOSCOPY      approx 4-5 years ago     Social History     Substance and Sexual Activity   Alcohol Use Not Currently    Comment: \"Not much\" per pt     Social History     Substance and Sexual Activity   Drug Use Yes   • Types: Marijuana    Comment: \"Not much\" per pt     Social History     Tobacco Use   Smoking Status Every Day   • Current packs/day: 1.00   • Average packs/day: 1 pack/day for 40.0 years (40.0 ttl pk-yrs)   • Types: Cigarettes   Smokeless Tobacco Never     Family History   Problem Relation Age of Onset   • No Known Problems Mother    • Diabetes unspecified Father    • Heart disease Father         post CABG   • Diabetes type II Father    • " "No Known Problems Brother        Meds/Allergies     Current Outpatient Medications:   •  Accu-Chek Softclix Lancets lancets  •  albuterol (PROVENTIL HFA,VENTOLIN HFA) 90 mcg/act inhaler  •  Aspirin Low Dose 81 MG chewable tablet  •  B-D UF III MINI PEN NEEDLES 31G X 5 MM MISC  •  Blood Glucose Monitoring Suppl (Accu-Chek Guide) w/Device KIT  •  cefuroxime (CEFTIN) 500 mg tablet  •  cholestyramine sugar free (QUESTRAN LIGHT) 4 g packet  •  cilostazol (PLETAL) 100 mg tablet  •  citalopram (CeleXA) 20 mg tablet  •  finasteride (PROSCAR) 5 mg tablet  •  fluticasone (FLONASE) 50 mcg/act nasal spray  •  furosemide (LASIX) 40 mg tablet  •  gabapentin (NEURONTIN) 400 mg capsule  •  glipiZIDE (GLUCOTROL) 5 mg tablet  •  glucose blood (Accu-Chek Guide) test strip  •  insulin aspart (NovoLOG FlexPen) 100 UNIT/ML injection pen  •  insulin detemir (LEVEMIR) 100 units/mL subcutaneous injection  •  Insulin Pen Needle 31G X 6 MM MISC  •  lisinopril (ZESTRIL) 10 mg tablet  •  loratadine (CLARITIN) 10 mg tablet  •  metFORMIN (GLUCOPHAGE) 1000 MG tablet  •  metoprolol succinate (TOPROL-XL) 25 mg 24 hr tablet  •  omeprazole (PriLOSEC) 20 mg delayed release capsule  •  oxyCODONE (OxyCONTIN) 10 mg 12 hr tablet  •  semaglutide, 0.25 or 0.5 mg/dose, (Ozempic, 0.25 or 0.5 MG/DOSE,) 2 mg/3 mL injection pen  •  simvastatin (ZOCOR) 20 mg tablet    No Known Allergies    PHYSICAL EXAM:    Blood pressure 106/72, height 5' 7\" (1.702 m), weight 113 kg (250 lb). Body mass index is 39.16 kg/m².  General Appearance: NAD, cooperative, alert  Eyes: Anicteric, conjunctiva pink   ENT:  Normocephalic, atraumatic, normal mucosa.    Neck:  Supple, symmetrical, trachea midline,   Resp:  Clear to auscultation bilaterally; no rales, rhonchi or wheezing; respirations unlabored   CV:  S1 S2, Regular rate and rhythm; no murmur, rub, or gallop.  GI:  Soft, non-tender, distended; normal bowel sounds; no masses, no organomegaly although exam limited by body " habitus  Rectal: Deferred  Musculoskeletal: No cyanosis, clubbing or edema.  Limited range of motion.  Left BKA.  Skin:  No jaundice, rashes, or lesions   Psych: Normal affect, good eye contact  Neuro: No gross deficits, AAOx3    Lab Results:   Lab Results   Component Value Date    WBC 16.32 (H) 03/26/2024    HGB 11.2 (L) 03/26/2024    HCT 35.6 (L) 03/26/2024    MCV 81 (L) 03/26/2024     (H) 03/26/2024     Lab Results   Component Value Date    K 3.7 03/26/2024    CL 98 03/26/2024    CO2 26 03/26/2024    BUN 18 03/26/2024    CREATININE 0.90 03/26/2024    CALCIUM 9.1 03/26/2024    AST 12 (L) 03/26/2024    ALT 11 03/26/2024    ALKPHOS 64 03/26/2024    EGFR 93 03/26/2024         Radiology Results:   CT abdomen pelvis with contrast    Result Date: 3/26/2024  Narrative: CT ABDOMEN AND PELVIS WITH IV CONTRAST INDICATION: abdominal pain. COMPARISON: None. TECHNIQUE: CT examination of the abdomen and pelvis was performed. Multiplanar 2D reformatted images were created from the source data. This examination, like all CT scans performed in the [facility], was performed utilizing techniques to minimize radiation dose exposure, including the use of iterative reconstruction and automated exposure control. Radiation dose length product (DLP) for this visit: 1121.93 mGy-cm IV Contrast: 100 mL of iohexol (OMNIPAQUE) Enteric Contrast: Not administered. FINDINGS: ABDOMEN LOWER CHEST: No clinically significant abnormality in the visualized lower chest. LIVER/BILIARY TREE: Hepatomegaly measuring 21.7 cm craniocaudal. GALLBLADDER: No calcified gallstones. No pericholecystic inflammatory change. SPLEEN: Unremarkable. PANCREAS: Unremarkable. ADRENAL GLANDS: Unremarkable. KIDNEYS/URETERS: Mild pelviectasis. No hydroureter. There is perinephric and periureteral fat stranding bilaterally with enhancement throughout the course of both ureters. No obstruction or stones visualized. This is contiguous with the bladder  inflammation suspected reflect ascending infection. STOMACH AND BOWEL: Unremarkable. APPENDIX: Normal. ABDOMINOPELVIC CAVITY: Diffuse stranding and small amount of free fluid in the pelvis surrounding the bladder and tracking along the retroperitoneum. No pneumoperitoneum. Prominent lymph nodes throughout the pelvis bilaterally, likely reactive. VESSELS: Atherosclerosis without abdominal aortic aneurysm. PELVIS REPRODUCTIVE ORGANS: Unremarkable for patient's age. URINARY BLADDER: Circumferential wall thickening with perivesicular stranding. ABDOMINAL WALL/INGUINAL REGIONS: Unremarkable. BONES: No acute fracture or suspicious osseous lesion. Disc degeneration at L5-S1 with severe disc space narrowing and prominent endplate osteophytes.     Impression: 1.  Findings consistent with ascending urinary tract infection involving the bladder and both ureters. 2.  Hepatomegaly. The study was marked in EPIC for immediate notification. Workstation performed: [id number]         REVIEW OF SYSTEMS:    CONSTITUTIONAL: Denies any fever, chills, rigors, and weight loss.  HEENT: No earache or tinnitus. Denies hearing loss or visual disturbances.  CARDIOVASCULAR: No chest pain or palpitations.   RESPIRATORY: Denies any cough, hemoptysis, shortness of breath or dyspnea on exertion.  GASTROINTESTINAL: As noted in the History of Present Illness.   GENITOURINARY: No problems with urination. Denies any hematuria or dysuria.  NEUROLOGIC: No dizziness or vertigo, denies headaches.   MUSCULOSKELETAL: Denies any muscle or joint pain.   SKIN: Denies skin rashes or itching.   ENDOCRINE: Denies excessive thirst. Denies intolerance to heat or cold.  PSYCHOSOCIAL: Denies depression or anxiety. Denies any recent memory loss.

## 2024-04-01 NOTE — PATIENT INSTRUCTIONS
"DAIRY-FREE DIET    Lactose is the sugar that is found naturally in milk and milk products, as well as foods with ingredients such as milk or whey. In order for the body to use lactose, it has to break it down using an enzyme called lactase. Lactose intolerance is a condition that occurs when a person does not produce enough lactase to break down the lactose in food.    Symptoms of lactose intolerance include:    Abdominal cramping  Bloating  Gas  Diarrhea    These symptoms may occur shortly after eating foods that contain lactose, but sometimes will not occur until hours later. This is because people vary in the amount of lactose they can tolerate.    Avoiding Lactose    There are many different words that are used to describe the different forms that lactose may come in. Read food labels, and stay away from foods with any of the following ingredients:    Milk  Skim milk powder  Skim milk solids  Lactose  Whey  Caseinate  Curd    Reading food/nutrition labels is a very important habit to get into when looking for foods that do not contain lactose. Lactose can come in many \"hidden\" forms, and even products that do not appear to have milk included, may in fact have a milk product as an ingredient.    Lactose-Free Foods    Following are lists of lactose-free foods, and foods to avoid:    Food Group     Beverages  ENJOY: Teas, regular iced tea, regular carbonated beverages, soy milk, cocoa powder, Nestle's Quik   AVOID: Milk (all types), powdered milk, sweetened/condensed milk, instant hot cocoa, instant iced tea, Ovaltine, chocolate drink mixes, cream, half-n-half and diet soda    Breads/Cereals/Crackers   ENJOY: Amie bread, Tuvaluan bread, Zoroastrianism rye bread, Italian bread, abdirizak crackers, soda crackers, Ritz crackers, hot or cold cereals without added milk solids (read the label)   AVOID: Breads/rolls containing milk, prepared baking mixes (muffins, biscuits, pancakes, etc.), Zwieback, corn nuts, instant cereal with " added milk solids    Potatoes and Starches   ENJOY: Items prepared without milk or milk products: macaroni, noodles, rice, spaghetti, white/sweet potatoes   AVOID: Products with milk added, such as instant potatoes, frozen French fries, Scalloped/au gratin potatoes, macaroni and cheese mixes    Vegetables   ENJOY: Fresh, frozen, and canned vegetables without added milk products   AVOID: Creamed or breaded vegetables or vegetables with margarine added    Fruit   ENJOY: Fresh, canned or frozen fruit not processed with milk/milk products   AVOID: Any canned or frozen fruit processed with milk or milk products    Meat and Meat Substitutes   ENJOY: Plain meat, fish, poultry, eggs, Kosher prepared meat products, soybean meat substitutes, dried peas, beans, lentils, and nuts   AVOID: Breaded or creamed eggs, fish, meat or poultry, luncheon meats, sausage, hot dogs containing cheese or cheese products    Fats and Oils   ENJOY: Salazar, shortening, Miracle Whip, milk-free margarine, diet imitation margarine, salad dressings without milk products, vegetable oils, olives, mayonnaise, Coffee Rich and Rich's Whipped Topping   AVOID: Sour cream, cream cheese, chip dips, sauces and salad dressings made with milk products and peanut butter with added milk solids    Soups/Combination Foods   ENJOY: Bouillon, broth, vegetable soups, clear, soups, consommes, homemade soups made with allowed ingredients   AVOID: Chowders, ream soups, canned and dehydrated soups containing milk products    Seasonings   ENJOY: Pure monosodium glutamate (msg), soy sauce, carob powder, olives, gravy made with water, Baker's cocoa, pure seasonings and spices, sugar, honey, corn syrup, jam, jelly, marmalade, and molasses   AVOID: Condiments with milk solids or lactose added    Desserts   ENJOY: Nikos food cake, homemade cookies, cakes or pies made from allowed ingredients, tofu desserts, pure-sugar candies, marshmallows, and gelatin   AVOID: Desserts prepared  with milk/milk products, pudding, sherbet, ice cream, custard, frozen yogurt, toffee, peppermint, butterscotch, chocolate, caramels, reduced-calorie desserts made with sugar substitute, or chewing gum made with lactose.

## 2024-04-06 LAB — ELASTASE PANC STL-MCNT: <15 MCG/G

## 2024-04-11 NOTE — RESULT ENCOUNTER NOTE
Severe pancreatic insufficiency. Awaiting Cdiff/ fecal deborah.    Sent [employer] message:    Dear Paxton,    I am still awaiting some of your labs, but the pancreatic elastase is very low. This is indicative of severe pancreatic insufficiency which can be attributing to your diarrhea symptoms.     While I wait for the remainder of the labs, how are you doing with the questran powder I prescribed at the last office visit? We may need to consider adding some pancreatic enzymes down the road.     Dr Hernández

## 2024-04-14 LAB
C DIFF TOX GENS STL QL NAA+PROBE: NOT DETECTED
CALPROTECTIN STL-MCNT: 132 MCG/G

## 2024-04-16 ENCOUNTER — TELEPHONE (OUTPATIENT)
Dept: GASTROENTEROLOGY | Facility: CLINIC | Age: 59
End: 2024-04-16

## 2024-04-16 NOTE — TELEPHONE ENCOUNTER
----- Message from Leydi Infante sent at 4/16/2024 12:40 PM EDT -----  Dr. Hernández would like patient scheduled sooner.  Thank you.  ----- Message -----  From: Dulce Maria Hernández MD  Sent: 4/15/2024   5:02 PM EDT  To: #    Negative infectious etiologies but Fecal Reynold elevated and Elastase very low. Can we pull up his office visit earlier so we can discuss?

## 2024-05-01 PROBLEM — Z12.11 COLON CANCER SCREENING: Status: RESOLVED | Noted: 2024-04-01 | Resolved: 2024-05-01

## 2024-05-02 DIAGNOSIS — Z79.4 TYPE 2 DIABETES MELLITUS WITH HYPERGLYCEMIA, WITH LONG-TERM CURRENT USE OF INSULIN (HCC): ICD-10-CM

## 2024-05-02 DIAGNOSIS — E11.65 TYPE 2 DIABETES MELLITUS WITH HYPERGLYCEMIA, WITH LONG-TERM CURRENT USE OF INSULIN (HCC): ICD-10-CM

## 2024-05-02 RX ORDER — BLOOD SUGAR DIAGNOSTIC
STRIP MISCELLANEOUS
Qty: 600 STRIP | Refills: 1 | Status: SHIPPED | OUTPATIENT
Start: 2024-05-02

## 2024-05-16 ENCOUNTER — TELEPHONE (OUTPATIENT)
Age: 59
End: 2024-05-16

## 2024-05-16 DIAGNOSIS — E11.65 TYPE 2 DIABETES MELLITUS WITH HYPERGLYCEMIA, WITH LONG-TERM CURRENT USE OF INSULIN (HCC): Primary | ICD-10-CM

## 2024-05-16 DIAGNOSIS — Z79.4 TYPE 2 DIABETES MELLITUS WITH HYPERGLYCEMIA, WITH LONG-TERM CURRENT USE OF INSULIN (HCC): Primary | ICD-10-CM

## 2024-05-16 NOTE — TELEPHONE ENCOUNTER
The patient was called back and made aware that the orders were entered into his chart and he stated that he normally goes to the [facility] in [place] and he was hoping to go this Saturday.  I did make him aware that they would be faxed to the facility as well.

## 2024-05-16 NOTE — TELEPHONE ENCOUNTER
Pt called in to schedule an appt. Pt is now scheduled to see ZECHARIAH Easotn on 2024. Pt has some orders that the  today 2024, please put in an updated lab order. Pt is also requesting to have labs mailed to him. Thank you!

## 2024-05-19 DIAGNOSIS — Z79.4 TYPE 2 DIABETES MELLITUS WITH HYPERGLYCEMIA, WITH LONG-TERM CURRENT USE OF INSULIN (HCC): ICD-10-CM

## 2024-05-19 DIAGNOSIS — E11.65 TYPE 2 DIABETES MELLITUS WITH HYPERGLYCEMIA, WITH LONG-TERM CURRENT USE OF INSULIN (HCC): ICD-10-CM

## 2024-05-19 RX ORDER — SEMAGLUTIDE 0.68 MG/ML
INJECTION, SOLUTION SUBCUTANEOUS
Qty: 3 ML | Refills: 1 | Status: SHIPPED | OUTPATIENT
Start: 2024-05-19

## 2024-05-26 LAB
ALBUMIN SERPL-MCNC: 3.7 G/DL (ref 3.6–5.1)
ALBUMIN/GLOB SERPL: 1.1 (CALC) (ref 1–2.5)
ALP SERPL-CCNC: 89 U/L (ref 35–144)
ALT SERPL-CCNC: 33 U/L (ref 9–46)
AST SERPL-CCNC: 29 U/L (ref 10–35)
BILIRUB SERPL-MCNC: 0.4 MG/DL (ref 0.2–1.2)
BUN SERPL-MCNC: 19 MG/DL (ref 7–25)
BUN/CREAT SERPL: ABNORMAL (CALC) (ref 6–22)
CALCIUM SERPL-MCNC: 9.2 MG/DL (ref 8.6–10.3)
CHLORIDE SERPL-SCNC: 103 MMOL/L (ref 98–110)
CHOLEST SERPL-MCNC: 110 MG/DL
CHOLEST/HDLC SERPL: 3.8 (CALC)
CO2 SERPL-SCNC: 28 MMOL/L (ref 20–32)
CREAT SERPL-MCNC: 0.83 MG/DL (ref 0.7–1.35)
GFR/BSA.PRED SERPLBLD CYS-BASED-ARV: 100 ML/MIN/1.73M2
GLOBULIN SER CALC-MCNC: 3.5 G/DL (CALC) (ref 1.9–3.7)
GLUCOSE SERPL-MCNC: 130 MG/DL (ref 65–99)
HBA1C MFR BLD: 8.9 % OF TOTAL HGB
HDLC SERPL-MCNC: 29 MG/DL
LDLC SERPL CALC-MCNC: 54 MG/DL (CALC)
NONHDLC SERPL-MCNC: 81 MG/DL (CALC)
POTASSIUM SERPL-SCNC: 4.6 MMOL/L (ref 3.5–5.3)
PROT SERPL-MCNC: 7.2 G/DL (ref 6.1–8.1)
SODIUM SERPL-SCNC: 139 MMOL/L (ref 135–146)
TRIGL SERPL-MCNC: 198 MG/DL

## 2024-05-29 DIAGNOSIS — Z79.4 TYPE 2 DIABETES MELLITUS WITH HYPERGLYCEMIA, WITH LONG-TERM CURRENT USE OF INSULIN (HCC): ICD-10-CM

## 2024-05-29 DIAGNOSIS — E11.65 TYPE 2 DIABETES MELLITUS WITH HYPERGLYCEMIA, WITH LONG-TERM CURRENT USE OF INSULIN (HCC): ICD-10-CM

## 2024-05-30 RX ORDER — INSULIN ASPART 100 [IU]/ML
INJECTION, SOLUTION INTRAVENOUS; SUBCUTANEOUS
Qty: 60 ML | Refills: 2 | Status: SHIPPED | OUTPATIENT
Start: 2024-05-30

## 2024-05-31 DIAGNOSIS — K59.1 FUNCTIONAL DIARRHEA: ICD-10-CM

## 2024-05-31 RX ORDER — CHOLESTYRAMINE LIGHT 4 G/5.7G
4 POWDER, FOR SUSPENSION ORAL 2 TIMES DAILY
Qty: 60 EACH | Refills: 5 | Status: CANCELLED | OUTPATIENT
Start: 2024-05-31

## 2024-06-01 ENCOUNTER — NURSE TRIAGE (OUTPATIENT)
Dept: OTHER | Facility: OTHER | Age: 59
End: 2024-06-01

## 2024-06-01 DIAGNOSIS — K59.1 FUNCTIONAL DIARRHEA: ICD-10-CM

## 2024-06-01 RX ORDER — CHOLESTYRAMINE LIGHT 4 G/5.7G
4 POWDER, FOR SUSPENSION ORAL 2 TIMES DAILY
Qty: 60 EACH | Refills: 4 | Status: SHIPPED | OUTPATIENT
Start: 2024-06-01 | End: 2024-06-01

## 2024-06-01 RX ORDER — CHOLESTYRAMINE LIGHT 4 G/5.7G
4 POWDER, FOR SUSPENSION ORAL 2 TIMES DAILY
Qty: 60 EACH | Refills: 0 | Status: SHIPPED | OUTPATIENT
Start: 2024-06-01 | End: 2024-06-04 | Stop reason: SDUPTHER

## 2024-06-01 NOTE — TELEPHONE ENCOUNTER
"Reason for Disposition   [1] Prescription prescribed recently is not at pharmacy AND [2] triager has access to patient's EMR AND [3] prescription is recorded in the EMR    Answer Assessment - Initial Assessment Questions  1. DRUG NAME: \"What medicine do you need to have refilled?\"      cholestyramine    2. REFILLS REMAINING: \"How many refills are remaining?\" (Note: The label on the medicine or pill bottle will show how many refills are remaining. If there are no refills remaining, then a renewal may be needed.)      none    3. EXPIRATION DATE: \"What is the expiration date?\" (Note: The label states when the prescription will , and thus can no longer be refilled.)      NA    4. PRESCRIBING HCP: \"Who prescribed it?\" Reason: If prescribed by specialist, call should be referred to that group.      JAMEL Nayak    Protocols used: Medication Refill and Renewal Call-ADULT-    "

## 2024-06-01 NOTE — TELEPHONE ENCOUNTER
"Reason for Disposition  • [1] Caller requesting a prescription renewal (no refills left), no triage required, AND [2] triager able to renew prescription per department policy    Answer Assessment - Initial Assessment Questions  1. DRUG NAME: \"What medicine do you need to have refilled?\"      Cholestyramine powder     2. REFILLS REMAINING: \"How many refills are remaining?\" (Note: The label on the medicine or pill bottle will show how many refills are remaining. If there are no refills remaining, then a renewal may be needed.)      5 refills remaining on prescription at [facility]    3. EXPIRATION DATE: \"What is the expiration date?\" (Note: The label states when the prescription will , and thus can no longer be refilled.)      0    4. PRESCRIBING HCP: \"Who prescribed it?\" Reason: If prescribed by specialist, call should be referred to that group.      Gastroenterology    Protocols used: Medication Refill and Renewal Call-ADULT-    "

## 2024-06-01 NOTE — TELEPHONE ENCOUNTER
Patient requesting prescription be transferred to the local [facility] on file. Informed the patient his prescription has been sent to the [facility]. Patient verbalized understanding, no further assistance needed at this time.

## 2024-06-01 NOTE — TELEPHONE ENCOUNTER
"Regarding: cholestyramine refill  ----- Message from Ghazal ISABEL sent at 6/1/2024  3:30 PM EDT -----  Pt stated, \"I need my prescription sent to another pharmacy because the one it was sent to is out of the medication.\"    Name cholestyramine sugar free (QUESTRAN LIGHT) 4 g packet  Dose/Frequency  4 g/2 times daily  Quantity 60 each  Verified pharmacy   [ x]  Verified ordering Provider   [ x]  Does patient have enough for the next 3 days? Yes [ ] No [x ]    [facility]   Address: [address] [place] [address]  Phone: [phone]    "

## 2024-06-01 NOTE — TELEPHONE ENCOUNTER
"Regarding: med refill concern  ----- Message from Monique BLACKWOOD sent at 6/1/2024 12:06 PM EDT -----  \"I need my refill sent to the pharmacy,I am completely out of medication.\"    "

## 2024-06-04 DIAGNOSIS — K59.1 FUNCTIONAL DIARRHEA: ICD-10-CM

## 2024-06-04 RX ORDER — CHOLESTYRAMINE LIGHT 4 G/5.7G
4 POWDER, FOR SUSPENSION ORAL 2 TIMES DAILY
Qty: 180 EACH | Refills: 1 | Status: SHIPPED | OUTPATIENT
Start: 2024-06-04 | End: 2024-06-06 | Stop reason: SDUPTHER

## 2024-06-04 NOTE — TELEPHONE ENCOUNTER
Patient called need to go to [facility] with 90 day supply for insurance to cover. medication is cholestyramine sugar free (QUESTRAN LIGHT) 4 g packet

## 2024-06-06 ENCOUNTER — OFFICE VISIT (OUTPATIENT)
Dept: GASTROENTEROLOGY | Facility: CLINIC | Age: 59
End: 2024-06-06
Payer: COMMERCIAL

## 2024-06-06 VITALS
HEIGHT: 67 IN | BODY MASS INDEX: 37.67 KG/M2 | SYSTOLIC BLOOD PRESSURE: 145 MMHG | WEIGHT: 240 LBS | DIASTOLIC BLOOD PRESSURE: 79 MMHG

## 2024-06-06 DIAGNOSIS — K86.89 PANCREATIC INSUFFICIENCY: ICD-10-CM

## 2024-06-06 DIAGNOSIS — E66.01 CLASS 2 SEVERE OBESITY DUE TO EXCESS CALORIES WITH SERIOUS COMORBIDITY AND BODY MASS INDEX (BMI) OF 37.0 TO 37.9 IN ADULT (HCC): ICD-10-CM

## 2024-06-06 DIAGNOSIS — K59.1 FUNCTIONAL DIARRHEA: Primary | ICD-10-CM

## 2024-06-06 DIAGNOSIS — E11.65 TYPE 2 DIABETES MELLITUS WITH HYPERGLYCEMIA, WITH LONG-TERM CURRENT USE OF INSULIN (HCC): ICD-10-CM

## 2024-06-06 DIAGNOSIS — Z12.11 COLON CANCER SCREENING: ICD-10-CM

## 2024-06-06 DIAGNOSIS — Z79.4 TYPE 2 DIABETES MELLITUS WITH HYPERGLYCEMIA, WITH LONG-TERM CURRENT USE OF INSULIN (HCC): ICD-10-CM

## 2024-06-06 PROBLEM — E66.812 CLASS 2 SEVERE OBESITY DUE TO EXCESS CALORIES WITH SERIOUS COMORBIDITY AND BODY MASS INDEX (BMI) OF 37.0 TO 37.9 IN ADULT (HCC): Status: ACTIVE | Noted: 2021-12-13

## 2024-06-06 PROCEDURE — 99214 OFFICE O/P EST MOD 30 MIN: CPT | Performed by: INTERNAL MEDICINE

## 2024-06-06 RX ORDER — CHOLESTYRAMINE LIGHT 4 G/5.7G
4 POWDER, FOR SUSPENSION ORAL 3 TIMES DAILY
Qty: 270 PACKET | Refills: 3 | Status: SHIPPED | OUTPATIENT
Start: 2024-06-06 | End: 2025-06-01

## 2024-06-06 NOTE — PROGRESS NOTES
[facility] - Outpatient Follow-up Note  Paxton Olson Jr. 59 y.o. male MRN: [id number]  Encounter: [id number]    ASSESSMENT AND PLAN:      1. Functional diarrhea  59M w diabetes here for follow up. Doing better - diarrhea is more manageable, taking out the dairy helped.     - prev colonoscopy in 2019 neg for MC  - stool studies otherwise negative    - cholestyramine sugar free (QUESTRAN LIGHT) 4 g packet; Take 1 packet (4 g total) by mouth 3 (three) times a day  Dispense: 270 packet; Refill: 3    2. Pancreatic insufficiency  Low fecal elastase.     - low fat diet  - will add creon if diarrhea worsens    3. Type 2 diabetes mellitus with hyperglycemia, with long-term current use of insulin ([facility])  On insulin    4. Class 2 severe obesity due to excess calories with serious comorbidity and body mass index (BMI) of 37.0 to 37.9 in adult ([facility])  Weight loss    5. Colon cancer screening  Utd, recall 2029      Followup Appointment: 6 months  ______________________________________________________________________    Chief Complaint   Patient presents with   • Follow-up     Diarrhea.  Much better     HPI:  59M here today for f/u of diarrhea. Doing better on questran. Not having accidents and can make it to the bathroom. Took out dairy which has helped.     Historical Information   Past Medical History:   Diagnosis Date   • Benign prostatic hyperplasia 12/13/2021   • BPH (benign prostatic hyperplasia)    • Diabetes mellitus (HCC)    • Diabetic polyneuropathy associated with type 2 diabetes mellitus ([facility]) 07/30/2018   • Herniated lumbar disc without myelopathy 11/25/2014   • Hyperlipidemia 07/30/2018   • Lumbar herniated disc    • Morbid obesity ([facility]) 12/13/2021   • Non-pressure chronic ulcer of other part of left foot with unspecified severity ([facility]) 12/13/2021   • Osteoarthritis    • Polyneuropathy 12/13/2021   • Type 2 diabetes mellitus with hyperglycemia, with long-term current use of  "insulin ([facility]) 07/30/2018     Past Surgical History:   Procedure Laterality Date   • BELOW KNEE LEG AMPUTATION Left    • COLONOSCOPY      approx 4-5 years ago     Social History     Substance and Sexual Activity   Alcohol Use Not Currently    Comment: \"Not much\" per pt     Social History     Substance and Sexual Activity   Drug Use Yes   • Types: Marijuana    Comment: \"Not much\" per pt     Social History     Tobacco Use   Smoking Status Every Day   • Current packs/day: 1.00   • Average packs/day: 1 pack/day for 40.0 years (40.0 ttl pk-yrs)   • Types: Cigarettes   Smokeless Tobacco Never     Family History   Problem Relation Age of Onset   • No Known Problems Mother    • Diabetes unspecified Father    • Heart disease Father         post CABG   • Diabetes type II Father    • No Known Problems Brother          Current Outpatient Medications:   •  Accu-Chek Softclix Lancets lancets  •  albuterol (PROVENTIL HFA,VENTOLIN HFA) 90 mcg/act inhaler  •  Aspirin Low Dose 81 MG chewable tablet  •  B-D UF III MINI PEN NEEDLES 31G X 5 MM MISC  •  Blood Glucose Monitoring Suppl (Accu-Chek Guide) w/Device KIT  •  cholestyramine sugar free (QUESTRAN LIGHT) 4 g packet  •  cilostazol (PLETAL) 100 mg tablet  •  citalopram (CeleXA) 20 mg tablet  •  finasteride (PROSCAR) 5 mg tablet  •  furosemide (LASIX) 40 mg tablet  •  gabapentin (NEURONTIN) 400 mg capsule  •  glipiZIDE (GLUCOTROL) 5 mg tablet  •  glucose blood (Accu-Chek Guide) test strip  •  insulin aspart (NovoLOG FlexPen) 100 UNIT/ML injection pen  •  insulin detemir (LEVEMIR) 100 units/mL subcutaneous injection  •  Insulin Pen Needle 31G X 6 MM MISC  •  lisinopril (ZESTRIL) 10 mg tablet  •  loratadine (CLARITIN) 10 mg tablet  •  metFORMIN (GLUCOPHAGE) 1000 MG tablet  •  metoprolol succinate (TOPROL-XL) 25 mg 24 hr tablet  •  omeprazole (PriLOSEC) 20 mg delayed release capsule  •  oxyCODONE (OxyCONTIN) 10 mg 12 hr tablet  •  semaglutide, 0.25 or 0.5 mg/dose, (Ozempic, 0.25 or 0.5 " "MG/DOSE,) 2 mg/3 mL injection pen  •  simvastatin (ZOCOR) 20 mg tablet  •  fluticasone (FLONASE) 50 mcg/act nasal spray  No Known Allergies  Reviewed medications and allergies and updated as indicated    PHYSICAL EXAM:    Blood pressure 145/79, height 5' 7\" (1.702 m), weight 109 kg (240 lb). Body mass index is 37.59 kg/m².  General Appearance: NAD, cooperative, alert  Eyes: Anicteric, conjunctiva pink  ENT:  Normocephalic, atraumatic, normal mucosa.    Neck:  Supple, symmetrical, trachea midline  Resp:  Clear to auscultation bilaterally; no rales, rhonchi or wheezing; respirations unlabored   CV:  S1 S2, Regular rate and rhythm; no murmur, rub, or gallop.  GI:  Soft, non-tender, non-distended; normal bowel sounds; no masses, no organomegaly   Rectal: Deferred  Musculoskeletal: No cyanosis, clubbing or edema. Limited ROM. L BKA, WC dependent.  Skin:  No jaundice, rashes, or lesions   Heme/Lymph: No palpable cervical lymphadenopathy  Psych: Normal affect, good eye contact  Neuro: No gross deficits, AAOx3    Lab Results:   Lab Results   Component Value Date    WBC 16.32 (H) 03/26/2024    HGB 11.2 (L) 03/26/2024    HCT 35.6 (L) 03/26/2024    MCV 81 (L) 03/26/2024     (H) 03/26/2024     Lab Results   Component Value Date    K 4.6 05/25/2024     05/25/2024    CO2 28 05/25/2024    BUN 19 05/25/2024    CREATININE 0.83 05/25/2024    CALCIUM 9.2 05/25/2024    AST 29 05/25/2024    ALT 33 05/25/2024    ALKPHOS 89 05/25/2024    EGFR 100 05/25/2024       Radiology Results:   No results found.    "

## 2024-06-10 ENCOUNTER — OFFICE VISIT (OUTPATIENT)
Dept: ENDOCRINOLOGY | Facility: HOSPITAL | Age: 59
End: 2024-06-10
Payer: COMMERCIAL

## 2024-06-10 VITALS — DIASTOLIC BLOOD PRESSURE: 64 MMHG | SYSTOLIC BLOOD PRESSURE: 100 MMHG | HEART RATE: 94 BPM

## 2024-06-10 DIAGNOSIS — Z79.4 TYPE 2 DIABETES MELLITUS WITH HYPERGLYCEMIA, WITH LONG-TERM CURRENT USE OF INSULIN (HCC): Primary | ICD-10-CM

## 2024-06-10 DIAGNOSIS — E11.65 TYPE 2 DIABETES MELLITUS WITH HYPERGLYCEMIA, WITH LONG-TERM CURRENT USE OF INSULIN (HCC): Primary | ICD-10-CM

## 2024-06-10 PROCEDURE — 99214 OFFICE O/P EST MOD 30 MIN: CPT | Performed by: PHYSICIAN ASSISTANT

## 2024-06-10 RX ORDER — ACYCLOVIR 400 MG/1
1 TABLET ORAL CONTINUOUS
Qty: 1 EACH | Refills: 0 | Status: SHIPPED | OUTPATIENT
Start: 2024-06-10

## 2024-06-10 RX ORDER — ACYCLOVIR 400 MG/1
1 TABLET ORAL
Qty: 3 EACH | Refills: 11 | Status: SHIPPED | OUTPATIENT
Start: 2024-06-10

## 2024-06-10 NOTE — PROGRESS NOTES
Paxton Olson Jr. 59 y.o. male MRN: [id number]    Encounter: [id number]      Assessment & Plan     Assessment:  This is a 59 y.o.-year-old male with type 2 diabetes with neuropathy, hypertension and hyperlipidemia.    Plan:  1. Type 2 diabetes: Most recent hemoglobin A1c was 8.9.  Unfortunately he has a lot going on in the last few months as he had amputation of the left foot.  No blood sugar logs present at today's office visit but states that he is typically below 200.  At this time he has limited physical activity as he has yet to have his prosthesis, so this is likely affecting his blood sugars.  At this point in time I would like to increase his Ozempic to 1 mg weekly to see if this helps with glucose levels.  He will continue with Levemir 50 units daily, NovoLog sliding scale up to 30 units daily, metformin 1000 mg twice a day and glipizide 10 mg twice a day.  Because he is utilizing sliding scale with his NovoLog, I think it be beneficial for him to start utilizing a CGM.  I can refer to diabetes education to help him set up.  He plans on using his smart phone, in which case we can link to his account and make adjustments to his insulin in between office visits much easier.  Contact the office with any concerns or questions.  Follow-up in 3 months with lab work completed prior to visit.    Patient is a type II diabetic currently utilizing for insulin injections a day.  He is utilizing a sliding scale when it comes to mealtime insulin.  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  Because of these factors, I think it would be extremely beneficial for him to start utilizing a CGM such as a Dexcom to monitor glucose levels, help improve A1c and prevent future complications.     2. Diabetic neuropathy: Recent amputation of the left foot.  Is following up with wound care.     3. Hypertension:  Normotensive in the office today.  Kidney function remains stable with normal electrolytes.  Continue with current  medications.  Repeat CMP prior to next office visit.       4. Hyperlipidemia:  Cholesterol doing well.  Will continue with current medication.  Repeat lipid panel prior to next office visit.    CC: Type 2 diabetes follow-up    History of Present Illness     HPI:  Paxton Olson Jr. is a 59 year old male with type 2 diabetes for 4 years.  He was lost to follow-up and has not been seen since May 2023.  It was having 6-month follow-ups, but canceled his last appointment.  Afterwards he had complications in his left foot and ultimately ended up with an amputation of his left foot.  Was in rehab for a while.  He is on oral agents and insulin at home and takes Metformin  mg 2 tablets twice a day, and glipizide 5 mg 2 tablets twice a day, Ozempic 0.5 mg weekly, levemir 50 units daily, and Humalog using a sliding scale and up to 30 units daily.  Previously on Farxiga and Jardiance, but discontinued this class of medication as they have been too expensive. He denies nephropathy, retinopathy, heart attack, stroke and claudication but does admit to neuropathy, recurrent ulcers, polydipsia, polyphagia, polyuria.  Has had some mild side effects with Ozempic.  As stated above he had an amputation of his left foot around January 2024.  Had some complications initially with the wound healing, but is doing much better at this time.  Is in the process of getting her prosthesis.     Hypoglycemic episodes: No.      The patient's last eye exam was 2024 the patient's last foot exam was in April 2022 at endocrine office, but does follow-up with wound care as he had a wound on his right foot and recently had an amputation of the left foot.     Blood Sugar/Glucometer/Pump/CGM review:  Is checking blood sugar 4 times a day, but did not bring his glucometer into the office.  States that he is keeping his blood sugars typically below 200.       He has hypertension and takes lisinopril / HCTZ 10/12.5 mg daily.  He has hyperlipidemia and  takes simvastatin 20 mg daily.  Denies any headaches, chest pain, MI or stroke-like symptoms.     Review of Systems   Constitutional:  Negative for chills, fatigue and fever.   HENT:  Negative for congestion, ear pain, hearing loss, rhinorrhea and sore throat.    Eyes:  Negative for pain and visual disturbance.   Respiratory:  Negative for cough, shortness of breath and wheezing.    Cardiovascular:  Negative for chest pain, palpitations and leg swelling.   Gastrointestinal:  Negative for abdominal pain, blood in stool, constipation, diarrhea, nausea and vomiting.   Endocrine: Negative for cold intolerance, heat intolerance and polyuria.   Genitourinary:  Negative for difficulty urinating, dysuria, frequency, hematuria and urgency.   Musculoskeletal:  Positive for gait problem (utilizing wheelchair). Negative for arthralgias, joint swelling and myalgias.        Left BKA   Skin:  Negative for rash and wound.   Neurological:  Negative for dizziness, syncope, weakness, numbness and headaches.   Psychiatric/Behavioral:  Negative for dysphoric mood and sleep disturbance. The patient is not nervous/anxious.        Historical Information   Past Medical History:   Diagnosis Date    Benign prostatic hyperplasia 12/13/2021    BPH (benign prostatic hyperplasia)     Diabetes mellitus ([facility])     Diabetic polyneuropathy associated with type 2 diabetes mellitus ([facility]) 07/30/2018    Herniated lumbar disc without myelopathy 11/25/2014    Hyperlipidemia 07/30/2018    Lumbar herniated disc     Morbid obesity ([facility]) 12/13/2021    Non-pressure chronic ulcer of other part of left foot with unspecified severity ([facility]) 12/13/2021    Osteoarthritis     Polyneuropathy 12/13/2021    Type 2 diabetes mellitus with hyperglycemia, with long-term current use of insulin ([facility]) 07/30/2018     Past Surgical History:   Procedure Laterality Date    BELOW KNEE LEG AMPUTATION Left     COLONOSCOPY      approx 4-5 years ago     Social History   Social History  "    Substance and Sexual Activity   Alcohol Use Not Currently    Comment: \"Not much\" per pt     Social History     Substance and Sexual Activity   Drug Use Yes    Types: Marijuana    Comment: \"Not much\" per pt     Social History     Tobacco Use   Smoking Status Every Day    Current packs/day: 1.00    Average packs/day: 1 pack/day for 40.0 years (40.0 ttl pk-yrs)    Types: Cigarettes   Smokeless Tobacco Never     Family History:   Family History   Problem Relation Age of Onset    No Known Problems Mother     Diabetes unspecified Father     Heart disease Father         post CABG    Diabetes type II Father     No Known Problems Brother        Meds/Allergies   Current Outpatient Medications   Medication Sig Dispense Refill    Accu-Chek Softclix Lancets lancets Check glucose 6 times daily 600 each 3    albuterol (PROVENTIL HFA,VENTOLIN HFA) 90 mcg/act inhaler if needed      Aspirin Low Dose 81 MG chewable tablet Chew 81 mg daily      B-D UF III MINI PEN NEEDLES 31G X 5 MM MISC USE 4 PEN NEEDLES DAILY 400 each 3    Blood Glucose Monitoring Suppl (Accu-Chek Guide) w/Device KIT Use in the morning 1 kit 0    cholestyramine sugar free (QUESTRAN LIGHT) 4 g packet Take 1 packet (4 g total) by mouth 3 (three) times a day 270 packet 3    cilostazol (PLETAL) 100 mg tablet Take 100 mg by mouth 2 (two) times a day      citalopram (CeleXA) 20 mg tablet TAKE 1 tablet Orally Once a day 90 days      Continuous Glucose  (Dexcom G7 ) BERNARDINO Use 1 Device continuous To monitor blood sugar 1 each 0    Continuous Glucose Sensor (Dexcom G7 Sensor) Use 1 Device every 10 days 3 each 11    finasteride (PROSCAR) 5 mg tablet Take 5 mg by mouth daily      furosemide (LASIX) 40 mg tablet Take 40 mg by mouth daily      gabapentin (NEURONTIN) 400 mg capsule Take 2 PO BID. (Patient taking differently: 1,200 mg Take  (2) am/ (2) pm BID.) 360 capsule 3    glipiZIDE (GLUCOTROL) 5 mg tablet TAKE 2 TABLETS BY MOUTH TWICE A  tablet 3    " glucose blood (Accu-Chek Guide) test strip CHECK GLUCOSE 6 TIMES DAILY 600 strip 1    insulin aspart (NovoLOG FlexPen) 100 UNIT/ML injection pen INJECT INSULIN UTILIZING SLIDING SCALE PRIOR TO EACH MEAL, USING UP TO 60 UNITS DAILY. (Patient taking differently: INJECT INSULIN UTILIZING SLIDING SCALE PRIOR TO EACH MEAL, USING UP TO 30 UNITS DAILY.) 60 mL 2    insulin detemir (LEVEMIR) 100 units/mL subcutaneous injection Inject 50 Units under the skin daily at bedtime      Insulin Pen Needle 31G X 6 MM MISC Use twice daily 100 each 3    lisinopril (ZESTRIL) 10 mg tablet Take 10 mg by mouth daily      loratadine (CLARITIN) 10 mg tablet Take 10 mg by mouth daily      metFORMIN (GLUCOPHAGE) 1000 MG tablet TAKE 1 TABLET BY MOUTH TWICE A DAY WITH MEALS 180 tablet 3    metoprolol succinate (TOPROL-XL) 25 mg 24 hr tablet Take 25 mg by mouth daily      omeprazole (PriLOSEC) 20 mg delayed release capsule Take 20 mg by mouth daily      oxyCODONE (OxyCONTIN) 10 mg 12 hr tablet Take 10 mg by mouth every 12 (twelve) hours      semaglutide, 1 mg/dose, (Ozempic, 1 MG/DOSE,) 4 mg/3 mL injection pen Inject 0.75 mL (1 mg total) under the skin every 7 days 3 mL 5    simvastatin (ZOCOR) 20 mg tablet Take 20 mg by mouth        fluticasone (FLONASE) 50 mcg/act nasal spray 1 spray into each nostril 2 (two) times a day (Patient not taking: Reported on 6/6/2024) 11.1 mL 0     No current facility-administered medications for this visit.     No Known Allergies    Objective   Vitals: Blood pressure 100/64, pulse 94.    Physical Exam  Vitals and nursing note reviewed.   Constitutional:       General: He is not in acute distress.     Appearance: Normal appearance. He is not diaphoretic.   HENT:      Head: Normocephalic and atraumatic.   Eyes:      General: No scleral icterus.     Extraocular Movements: Extraocular movements intact.      Conjunctiva/sclera: Conjunctivae normal.      Pupils: Pupils are equal, round, and reactive to light.    Cardiovascular:      Rate and Rhythm: Normal rate and regular rhythm.      Heart sounds: No murmur heard.  Pulmonary:      Effort: Pulmonary effort is normal. No respiratory distress.      Breath sounds: Normal breath sounds. No wheezing.   Musculoskeletal:      Cervical back: Normal range of motion.      Right lower leg: No edema.      Comments: Left BKA   Lymphadenopathy:      Cervical: No cervical adenopathy.   Skin:     General: Skin is warm and dry.   Neurological:      Mental Status: He is alert and oriented to person, place, and time. Mental status is at baseline.      Sensory: No sensory deficit.      Gait: Gait abnormal (Utilizing wheelchair).   Psychiatric:         Mood and Affect: Mood normal.         Behavior: Behavior normal.         Thought Content: Thought content normal.         The history was obtained from the review of the chart, patient.    Lab Results:   Lab Results   Component Value Date/Time    Hemoglobin A1C 8.9 (H) 05/25/2024 07:57 AM    Hemoglobin A1C 7.6 11/09/2023 12:00 AM    WBC 16.32 (H) 03/26/2024 05:47 PM    Hemoglobin 11.2 (L) 03/26/2024 05:47 PM    Hematocrit 35.6 (L) 03/26/2024 05:47 PM    MCV 81 (L) 03/26/2024 05:47 PM    Platelets 425 (H) 03/26/2024 05:47 PM    BUN 19 05/25/2024 07:57 AM    BUN 18 03/26/2024 05:47 PM    Potassium 4.6 05/25/2024 07:57 AM    Potassium 3.7 03/26/2024 05:47 PM    Chloride 103 05/25/2024 07:57 AM    Chloride 98 03/26/2024 05:47 PM    CO2 28 05/25/2024 07:57 AM    CO2 26 03/26/2024 05:47 PM    Creatinine 0.83 05/25/2024 07:57 AM    Creatinine 0.90 03/26/2024 05:47 PM    AST 29 05/25/2024 07:57 AM    AST 12 (L) 03/26/2024 05:47 PM    ALT 33 05/25/2024 07:57 AM    ALT 11 03/26/2024 05:47 PM    Total Protein 8.1 03/26/2024 05:47 PM    Protein, Total 7.2 05/25/2024 07:57 AM    Albumin 3.7 05/25/2024 07:57 AM    Albumin 3.9 03/26/2024 05:47 PM    Globulin 3.5 05/25/2024 07:57 AM    HDL 29 (L) 05/25/2024 07:57 AM    Triglycerides 198 (H) 05/25/2024 07:57  "AM       Portions of the record may have been created with voice recognition software. Occasional wrong word or \"sound a like\" substitutions may have occurred due to the inherent limitations of voice recognition software. Read the chart carefully and recognize, using context, where substitutions have occurred.    "

## 2024-06-10 NOTE — PATIENT INSTRUCTIONS
Continue to monitor diet, maintain physical activity.  Make sure to drink plenty of water throughout the day.     Continue with current diabetes medications.     Increase Ozempic to 1 mg weekly.     Continue with simvastatin for hyperlipidemia.     Complete blood sugar log for 2 weeks and send into the office. We will work on getting a Dexcom.     Get lab work completed prior to 3 month follow-up.

## 2024-06-22 DIAGNOSIS — E11.42 DIABETIC POLYNEUROPATHY ASSOCIATED WITH TYPE 2 DIABETES MELLITUS (HCC): ICD-10-CM

## 2024-06-22 DIAGNOSIS — E11.65 TYPE 2 DIABETES MELLITUS WITH HYPERGLYCEMIA, WITHOUT LONG-TERM CURRENT USE OF INSULIN (HCC): ICD-10-CM

## 2024-06-22 RX ORDER — GLIPIZIDE 5 MG/1
TABLET ORAL
Qty: 360 TABLET | Refills: 1 | Status: SHIPPED | OUTPATIENT
Start: 2024-06-22

## 2024-06-22 RX ORDER — FLURBIPROFEN SODIUM 0.3 MG/ML
SOLUTION/ DROPS OPHTHALMIC
Qty: 400 EACH | Refills: 1 | Status: SHIPPED | OUTPATIENT
Start: 2024-06-22

## 2024-07-19 ENCOUNTER — TELEPHONE (OUTPATIENT)
Age: 59
End: 2024-07-19

## 2024-07-23 ENCOUNTER — TELEPHONE (OUTPATIENT)
Age: 59
End: 2024-07-23

## 2024-07-23 DIAGNOSIS — E11.65 TYPE 2 DIABETES MELLITUS WITH HYPERGLYCEMIA, WITHOUT LONG-TERM CURRENT USE OF INSULIN (HCC): Primary | ICD-10-CM

## 2024-07-23 NOTE — TELEPHONE ENCOUNTER
Patient called the RX Refill Line. Message is being forwarded to the office.     Patient is requesting a different prescription then Ozempic, asking for Mounjaro.  States the Ozempic is not helping patient lose weight and has been having nausea and vomiting with the medication, if approved please send the prescription to [facility] [place].     Please contact patient at [phone]

## 2024-08-14 NOTE — TELEPHONE ENCOUNTER
Patient informed  Detail Level: Detailed General Sunscreen Counseling: I recommended a broad spectrum sunscreen with a SPF of 30 or higher.  I explained that SPF 30 sunscreens block approximately 97 percent of the sun's harmful rays.  Sunscreens should be applied at least 15 minutes prior to expected sun exposure and then every 2 hours after that as long as sun exposure continues. If swimming or exercising sunscreen should be reapplied every 45 minutes to an hour after getting wet or sweating.  One ounce, or the equivalent of a shot glass full of sunscreen, is adequate to protect the skin not covered by a bathing suit. I also recommended a lip balm with a sunscreen as well. Sun protective clothing can be used in lieu of sunscreen but must be worn the entire time you are exposed to the sun's rays.

## 2024-08-15 ENCOUNTER — TELEPHONE (OUTPATIENT)
Age: 59
End: 2024-08-15

## 2024-08-15 NOTE — TELEPHONE ENCOUNTER
Pt called in stating that he has been on Mounjaro for a month and he feels as though it is not doing much for him. He states that he is still feeling the need to want to eat more often. He wanted to know if Jemma could go up on his dosage. Please contact patient and advise.

## 2024-08-21 NOTE — TELEPHONE ENCOUNTER
Patient called- he never received a call from us. I verified that he can discuss an increase in dose at his next appointment. He verbalized understanding, no further actions needed.

## 2024-09-01 LAB
ALBUMIN SERPL-MCNC: 3.7 G/DL (ref 3.6–5.1)
ALBUMIN/GLOB SERPL: 1 (CALC) (ref 1–2.5)
ALP SERPL-CCNC: 77 U/L (ref 35–144)
ALT SERPL-CCNC: 15 U/L (ref 9–46)
AST SERPL-CCNC: 15 U/L (ref 10–35)
BILIRUB SERPL-MCNC: 0.3 MG/DL (ref 0.2–1.2)
BUN SERPL-MCNC: 14 MG/DL (ref 7–25)
BUN/CREAT SERPL: ABNORMAL (CALC) (ref 6–22)
CALCIUM SERPL-MCNC: 9 MG/DL (ref 8.6–10.3)
CHLORIDE SERPL-SCNC: 99 MMOL/L (ref 98–110)
CO2 SERPL-SCNC: 28 MMOL/L (ref 20–32)
CREAT SERPL-MCNC: 0.88 MG/DL (ref 0.7–1.35)
GFR/BSA.PRED SERPLBLD CYS-BASED-ARV: 98 ML/MIN/1.73M2
GLOBULIN SER CALC-MCNC: 3.7 G/DL (CALC) (ref 1.9–3.7)
GLUCOSE SERPL-MCNC: 192 MG/DL (ref 65–99)
POTASSIUM SERPL-SCNC: 4.6 MMOL/L (ref 3.5–5.3)
PROT SERPL-MCNC: 7.4 G/DL (ref 6.1–8.1)
SODIUM SERPL-SCNC: 136 MMOL/L (ref 135–146)

## 2024-09-12 ENCOUNTER — OFFICE VISIT (OUTPATIENT)
Dept: ENDOCRINOLOGY | Facility: HOSPITAL | Age: 59
End: 2024-09-12
Payer: COMMERCIAL

## 2024-09-12 VITALS
DIASTOLIC BLOOD PRESSURE: 86 MMHG | SYSTOLIC BLOOD PRESSURE: 120 MMHG | HEIGHT: 67 IN | HEART RATE: 104 BPM | BODY MASS INDEX: 37.59 KG/M2 | OXYGEN SATURATION: 96 %

## 2024-09-12 DIAGNOSIS — E11.65 TYPE 2 DIABETES MELLITUS WITH HYPERGLYCEMIA, WITHOUT LONG-TERM CURRENT USE OF INSULIN (HCC): Primary | ICD-10-CM

## 2024-09-12 LAB — SL AMB POCT HEMOGLOBIN AIC: 9.2 (ref ?–6.5)

## 2024-09-12 PROCEDURE — 83036 HEMOGLOBIN GLYCOSYLATED A1C: CPT | Performed by: PHYSICIAN ASSISTANT

## 2024-09-12 PROCEDURE — 95251 CONT GLUC MNTR ANALYSIS I&R: CPT | Performed by: PHYSICIAN ASSISTANT

## 2024-09-12 PROCEDURE — 99214 OFFICE O/P EST MOD 30 MIN: CPT | Performed by: PHYSICIAN ASSISTANT

## 2024-09-12 RX ORDER — GABAPENTIN 600 MG/1
1200 TABLET ORAL 2 TIMES DAILY
COMMUNITY
Start: 2024-08-19

## 2024-09-12 RX ORDER — LISINOPRIL/HYDROCHLOROTHIAZIDE 10-12.5 MG
1 TABLET ORAL DAILY
COMMUNITY
Start: 2024-08-30

## 2024-09-12 NOTE — PROGRESS NOTES
Paxton Olson Jr. 59 y.o. male MRN: [id number]    Encounter: [id number]      Assessment & Plan     Assessment:  This is a 59 y.o.-year-old male with type 2 diabetes with neuropathy, hypertension and hyperlipidemia.    Plan:  1. Type 2 diabetes: Most recent hemoglobin A1c completed in office today was 9.2.  That being said review of his Dexcom shows that his A1c should improve.  I do want to make some tweaks to his medications.  Would like to increase his Mounjaro to 5 mg weekly.  In an effort to help minimize hypoglycemia overnight we will decrease his Levemir to 45 units.  As for the NovoLog I would like to increase his breakfast and dinner doses to 12 units and keep his lunchtime dose at 10 units.  Continue utilizing Dexcom to monitor glucose levels.  Contact the office with any concerns or questions.  I would like to see a download in about 2 to 4 weeks whenever is convenient for him.  Follow-up in 3 months with lab work completed prior to visit.     Patient is a type II diabetic currently utilizing for insulin injections a day.  He is utilizing a sliding scale when it comes to mealtime insulin.  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  Because of these factors, I think it would be extremely beneficial for him to start utilizing a CGM such as a Dexcom to monitor glucose levels, help improve A1c and prevent future complications.     2. Diabetic neuropathy: Recent amputation of the left foot.  Is following up with wound care.     3. Hypertension:  Normotensive in the office today.  Kidney function remains stable with normal electrolytes.  Continue with current medications.  Repeat CMP prior to next office visit.       4. Hyperlipidemia:  Cholesterol doing well.  Will continue with current medication.  Repeat lipid panel prior to next office visit.    CC: Type 2 diabetes follow-up    History of Present Illness     HPI:  Paxton Olson Jr. is a 59 year old male with type 2 diabetes diagnosed around 2024.  He is  on oral agents and insulin at home and takes Metformin  mg 2 tablets twice a day, and glipizide 5 mg 2 tablets twice a day, Mounjaro 2.5 mg weekly, levemir 50 units daily, and Humalog using a sliding scale and up to 30 units daily.  He was switched from Ozempic to Mounjaro after last office visit to see if this would help with side effects, glucose levels, and weight.  Previously on Farxiga and Jardiance, but discontinued this class of medication as they have been too expensive. He denies nephropathy, retinopathy, heart attack, stroke and claudication but does admit to neuropathy, recurrent ulcers, polydipsia, polyphagia, polyuria.  Still has some side effects with Mounjaro, but a lot less than the Ozempic.  As stated above he had an amputation of his left foot around January 2024.  Has had some wound infections around where he had his amputation.  Is in the process of getting her prosthesis.     Hypoglycemic episodes: No. Once a week.     The patient's last eye exam was 2024 the patient's last foot exam was in April 2022 at endocrine office, but does follow-up with wound care as he had a wound on his right foot and recently had an amputation of the left foot.     Blood Sugar/Glucometer/Pump/CGM review: Download of his Dexcom from August 30 through number 12/2024 reveals an average glucose level of 173 with a standard deviation of 41.  He is in target range 55% time, above target range 44% time, below target range approximately 1% time.  Glucose levels start trending down around 9 PM at night, and continue overnight.  Levels will start increasing around 6 AM likely due to donnie phenomenon, and increase more with breakfast around 10 AM.  Will start trending down after lunch, and then increase again with dinner.  Occasional episodes of hypoglycemia mainly occurring at night.      He has hypertension and takes lisinopril / HCTZ 10/12.5 mg daily.  He has hyperlipidemia and takes simvastatin 20 mg daily.  Denies any  headaches, chest pain, MI or stroke-like symptoms.     Review of Systems   Constitutional:  Negative for chills, fatigue and fever.   HENT:  Negative for congestion, ear pain, hearing loss, rhinorrhea and sore throat.    Eyes:  Negative for pain and visual disturbance.   Respiratory:  Negative for cough, shortness of breath and wheezing.    Cardiovascular:  Negative for chest pain, palpitations and leg swelling.   Gastrointestinal:  Negative for abdominal pain, blood in stool, constipation, diarrhea, nausea and vomiting.   Endocrine: Negative for cold intolerance, heat intolerance and polyuria.   Genitourinary:  Negative for difficulty urinating, dysuria, frequency, hematuria and urgency.   Musculoskeletal:  Positive for gait problem (utilizing wheelchair). Negative for arthralgias, joint swelling and myalgias.        Left BKA   Skin:  Negative for rash and wound.   Neurological:  Negative for dizziness, syncope, weakness, numbness and headaches.   Psychiatric/Behavioral:  Negative for dysphoric mood and sleep disturbance. The patient is not nervous/anxious.        Historical Information   Past Medical History:   Diagnosis Date    Benign prostatic hyperplasia 12/13/2021    BPH (benign prostatic hyperplasia)     Diabetes mellitus (HCC)     Diabetic polyneuropathy associated with type 2 diabetes mellitus ([facility]) 07/30/2018    Herniated lumbar disc without myelopathy 11/25/2014    Hyperlipidemia 07/30/2018    Lumbar herniated disc     Morbid obesity ([facility]) 12/13/2021    Non-pressure chronic ulcer of other part of left foot with unspecified severity ([facility]) 12/13/2021    Osteoarthritis     Polyneuropathy 12/13/2021    Type 2 diabetes mellitus with hyperglycemia, with long-term current use of insulin ([facility]) 07/30/2018     Past Surgical History:   Procedure Laterality Date    BELOW KNEE LEG AMPUTATION Left     COLONOSCOPY      approx 4-5 years ago     Social History   Social History     Substance and Sexual Activity   Alcohol  "Use Not Currently    Comment: \"Not much\" per pt     Social History     Substance and Sexual Activity   Drug Use Yes    Types: Marijuana    Comment: \"Not much\" per pt     Social History     Tobacco Use   Smoking Status Every Day    Current packs/day: 1.00    Average packs/day: 1 pack/day for 40.0 years (40.0 ttl pk-yrs)    Types: Cigarettes   Smokeless Tobacco Never     Family History:   Family History   Problem Relation Age of Onset    No Known Problems Mother     Diabetes unspecified Father     Heart disease Father         post CABG    Diabetes type II Father     No Known Problems Brother        Meds/Allergies   Current Outpatient Medications   Medication Sig Dispense Refill    Accu-Chek Softclix Lancets lancets Check glucose 6 times daily 600 each 3    albuterol (PROVENTIL HFA,VENTOLIN HFA) 90 mcg/act inhaler if needed      amoxicillin-clavulanate (AUGMENTIN) 875-125 mg per tablet TAKE ONE TABLET BY MOUTH TWICE DAILY FOR additional 7 days      Aspirin Low Dose 81 MG chewable tablet Chew 81 mg daily      B-D UF III MINI PEN NEEDLES 31G X 5 MM MISC USE 4 PEN NEEDLES DAILY 400 each 1    Blood Glucose Monitoring Suppl (Accu-Chek Guide) w/Device KIT Use in the morning 1 kit 0    cholestyramine sugar free (QUESTRAN LIGHT) 4 g packet Take 1 packet (4 g total) by mouth 3 (three) times a day 270 packet 3    cilostazol (PLETAL) 100 mg tablet Take 100 mg by mouth 2 (two) times a day      citalopram (CeleXA) 20 mg tablet TAKE 1 tablet Orally Once a day 90 days      Continuous Glucose  (Dexcom G7 ) BERNARDINO Use 1 Device continuous To monitor blood sugar 1 each 0    Continuous Glucose Sensor (Dexcom G7 Sensor) Use 1 Device every 10 days 3 each 11    finasteride (PROSCAR) 5 mg tablet Take 5 mg by mouth daily      furosemide (LASIX) 40 mg tablet Take 40 mg by mouth daily      gabapentin (NEURONTIN) 600 MG tablet Take 1,200 mg by mouth 2 (two) times a day      glipiZIDE (GLUCOTROL) 5 mg tablet TAKE 2 TABLETS BY MOUTH " "TWICE A  tablet 1    glucose blood (Accu-Chek Guide) test strip CHECK GLUCOSE 6 TIMES DAILY 600 strip 1    insulin aspart (NovoLOG FlexPen) 100 UNIT/ML injection pen INJECT INSULIN UTILIZING SLIDING SCALE PRIOR TO EACH MEAL, USING UP TO 60 UNITS DAILY. (Patient taking differently: INJECT INSULIN UTILIZING SLIDING SCALE PRIOR TO EACH MEAL, USING UP TO 30 UNITS DAILY.) 60 mL 2    insulin detemir (LEVEMIR) 100 units/mL subcutaneous injection Inject 50 Units under the skin daily at bedtime      Insulin Pen Needle 31G X 6 MM MISC Use twice daily 100 each 3    lisinopril-hydrochlorothiazide (PRINZIDE,ZESTORETIC) 10-12.5 MG per tablet Take 1 tablet by mouth daily      loratadine (CLARITIN) 10 mg tablet Take 10 mg by mouth daily      metFORMIN (GLUCOPHAGE) 1000 MG tablet TAKE 1 TABLET BY MOUTH TWICE A DAY WITH MEALS 180 tablet 3    metoprolol succinate (TOPROL-XL) 25 mg 24 hr tablet Take 25 mg by mouth daily      omeprazole (PriLOSEC) 20 mg delayed release capsule Take 20 mg by mouth daily      oxyCODONE (OxyCONTIN) 10 mg 12 hr tablet Take 10 mg by mouth every 12 (twelve) hours      simvastatin (ZOCOR) 20 mg tablet Take 20 mg by mouth        tirzepatide (Mounjaro) 2.5 MG/0.5ML Inject 0.5 mL (2.5 mg total) under the skin every 7 days 2 mL 5    fluticasone (FLONASE) 50 mcg/act nasal spray 1 spray into each nostril 2 (two) times a day (Patient not taking: Reported on 6/6/2024) 11.1 mL 0    gabapentin (NEURONTIN) 400 mg capsule Take 2 PO BID. (Patient not taking: Reported on 9/12/2024) 360 capsule 3    lisinopril (ZESTRIL) 10 mg tablet Take 10 mg by mouth daily (Patient not taking: Reported on 9/12/2024)       No current facility-administered medications for this visit.     No Known Allergies    Objective   Vitals: Blood pressure 120/86, pulse 104, height 5' 7\" (1.702 m), SpO2 96%.    Physical Exam  Vitals and nursing note reviewed.   Constitutional:       General: He is not in acute distress.     Appearance: Normal " appearance. He is not diaphoretic.   HENT:      Head: Normocephalic and atraumatic.   Eyes:      General: No scleral icterus.     Extraocular Movements: Extraocular movements intact.      Conjunctiva/sclera: Conjunctivae normal.      Pupils: Pupils are equal, round, and reactive to light.   Cardiovascular:      Rate and Rhythm: Normal rate and regular rhythm.      Heart sounds: No murmur heard.  Pulmonary:      Effort: Pulmonary effort is normal. No respiratory distress.      Breath sounds: Normal breath sounds. No wheezing.   Musculoskeletal:      Right lower leg: No edema.      Comments: Left BKA   Lymphadenopathy:      Cervical: No cervical adenopathy.   Skin:     General: Skin is warm and dry.   Neurological:      Mental Status: He is alert and oriented to person, place, and time. Mental status is at baseline.      Sensory: No sensory deficit.      Gait: Gait abnormal (Utilizing a wheelchair).   Psychiatric:         Mood and Affect: Mood normal.         Behavior: Behavior normal.         Thought Content: Thought content normal.         The history was obtained from the review of the chart, patient.    Lab Results:   Lab Results   Component Value Date/Time    Hemoglobin A1C 8.9 (H) 05/25/2024 07:57 AM    Hemoglobin A1C 7.6 11/09/2023 12:00 AM    WBC 16.32 (H) 03/26/2024 05:47 PM    Hemoglobin 11.2 (L) 03/26/2024 05:47 PM    Hematocrit 35.6 (L) 03/26/2024 05:47 PM    MCV 81 (L) 03/26/2024 05:47 PM    Platelets 425 (H) 03/26/2024 05:47 PM    BUN 14 08/31/2024 07:35 AM    BUN 19 05/25/2024 07:57 AM    BUN 18 03/26/2024 05:47 PM    Potassium 4.6 08/31/2024 07:35 AM    Potassium 4.6 05/25/2024 07:57 AM    Potassium 3.7 03/26/2024 05:47 PM    Chloride 99 08/31/2024 07:35 AM    Chloride 103 05/25/2024 07:57 AM    Chloride 98 03/26/2024 05:47 PM    CO2 28 08/31/2024 07:35 AM    CO2 28 05/25/2024 07:57 AM    CO2 26 03/26/2024 05:47 PM    Creatinine 0.88 08/31/2024 07:35 AM    Creatinine 0.83 05/25/2024 07:57 AM     "Creatinine 0.90 03/26/2024 05:47 PM    AST 15 08/31/2024 07:35 AM    AST 29 05/25/2024 07:57 AM    AST 12 (L) 03/26/2024 05:47 PM    ALT 15 08/31/2024 07:35 AM    ALT 33 05/25/2024 07:57 AM    ALT 11 03/26/2024 05:47 PM    Total Protein 8.1 03/26/2024 05:47 PM    Protein, Total 7.4 08/31/2024 07:35 AM    Protein, Total 7.2 05/25/2024 07:57 AM    Albumin 3.7 08/31/2024 07:35 AM    Albumin 3.7 05/25/2024 07:57 AM    Albumin 3.9 03/26/2024 05:47 PM    Globulin 3.7 08/31/2024 07:35 AM    Globulin 3.5 05/25/2024 07:57 AM    HDL 29 (L) 05/25/2024 07:57 AM    Triglycerides 198 (H) 05/25/2024 07:57 AM         Portions of the record may have been created with voice recognition software. Occasional wrong word or \"sound a like\" substitutions may have occurred due to the inherent limitations of voice recognition software. Read the chart carefully and recognize, using context, where substitutions have occurred.    "

## 2024-09-12 NOTE — PATIENT INSTRUCTIONS
Continue to monitor diet, maintain physical activity.  Make sure to drink plenty of water throughout the day.     Decrease Levemir to 45 units daily, and change Novolog to 12 units with breakfast and dinner and 10 units with lunch.     Increase Mounjaro to 5 mg weekly.     Continue with simvastatin for hyperlipidemia.     Continue to use Dexcom and would like to see a download in about 2-4 weeks.     Get lab work completed prior to 3 month follow-up.

## 2024-09-16 DIAGNOSIS — Z79.4 TYPE 2 DIABETES MELLITUS WITH HYPERGLYCEMIA, WITH LONG-TERM CURRENT USE OF INSULIN (HCC): Primary | ICD-10-CM

## 2024-09-16 DIAGNOSIS — E11.65 TYPE 2 DIABETES MELLITUS WITH HYPERGLYCEMIA, WITH LONG-TERM CURRENT USE OF INSULIN (HCC): Primary | ICD-10-CM

## 2024-09-16 RX ORDER — INSULIN DEGLUDEC 100 U/ML
INJECTION, SOLUTION SUBCUTANEOUS
Qty: 15 ML | Refills: 2 | Status: SHIPPED | OUTPATIENT
Start: 2024-09-16

## 2024-09-16 NOTE — TELEPHONE ENCOUNTER
Patient needs refill of Mounjaro but said you increased him to 5 mg? He also needs refill of Levemir but when I went to add it, it said Tresiba is the preferred alternative.

## 2024-11-18 DIAGNOSIS — E11.65 TYPE 2 DIABETES MELLITUS WITH HYPERGLYCEMIA, WITHOUT LONG-TERM CURRENT USE OF INSULIN (HCC): ICD-10-CM

## 2024-11-18 DIAGNOSIS — E11.42 DIABETIC POLYNEUROPATHY ASSOCIATED WITH TYPE 2 DIABETES MELLITUS (HCC): ICD-10-CM

## 2024-11-19 RX ORDER — FLURBIPROFEN SODIUM 0.3 MG/ML
SOLUTION/ DROPS OPHTHALMIC
Qty: 400 EACH | Refills: 1 | Status: SHIPPED | OUTPATIENT
Start: 2024-11-19

## 2024-12-08 LAB
ALBUMIN SERPL-MCNC: 4.1 G/DL (ref 3.6–5.1)
ALBUMIN/GLOB SERPL: 1.2 (CALC) (ref 1–2.5)
ALP SERPL-CCNC: 63 U/L (ref 35–144)
ALT SERPL-CCNC: 29 U/L (ref 9–46)
AST SERPL-CCNC: 28 U/L (ref 10–35)
BILIRUB SERPL-MCNC: 0.6 MG/DL (ref 0.2–1.2)
BUN SERPL-MCNC: 31 MG/DL (ref 7–25)
BUN/CREAT SERPL: 30 (CALC) (ref 6–22)
CALCIUM SERPL-MCNC: 9.4 MG/DL (ref 8.6–10.3)
CHLORIDE SERPL-SCNC: 98 MMOL/L (ref 98–110)
CO2 SERPL-SCNC: 26 MMOL/L (ref 20–32)
CREAT SERPL-MCNC: 1.04 MG/DL (ref 0.7–1.35)
GFR/BSA.PRED SERPLBLD CYS-BASED-ARV: 82 ML/MIN/1.73M2
GLOBULIN SER CALC-MCNC: 3.5 G/DL (CALC) (ref 1.9–3.7)
GLUCOSE SERPL-MCNC: 153 MG/DL (ref 65–99)
HBA1C MFR BLD: 8.4 % OF TOTAL HGB
POTASSIUM SERPL-SCNC: 4.1 MMOL/L (ref 3.5–5.3)
PROT SERPL-MCNC: 7.6 G/DL (ref 6.1–8.1)
SODIUM SERPL-SCNC: 136 MMOL/L (ref 135–146)

## 2024-12-10 ENCOUNTER — RESULTS FOLLOW-UP (OUTPATIENT)
Dept: ENDOCRINOLOGY | Facility: HOSPITAL | Age: 59
End: 2024-12-10

## 2024-12-17 ENCOUNTER — OFFICE VISIT (OUTPATIENT)
Dept: GASTROENTEROLOGY | Facility: CLINIC | Age: 59
End: 2024-12-17
Payer: COMMERCIAL

## 2024-12-17 VITALS — DIASTOLIC BLOOD PRESSURE: 68 MMHG | SYSTOLIC BLOOD PRESSURE: 128 MMHG

## 2024-12-17 DIAGNOSIS — K59.1 FUNCTIONAL DIARRHEA: Primary | ICD-10-CM

## 2024-12-17 DIAGNOSIS — E66.812 CLASS 2 SEVERE OBESITY DUE TO EXCESS CALORIES WITH SERIOUS COMORBIDITY AND BODY MASS INDEX (BMI) OF 37.0 TO 37.9 IN ADULT (HCC): ICD-10-CM

## 2024-12-17 DIAGNOSIS — Z79.4 TYPE 2 DIABETES MELLITUS WITH HYPERGLYCEMIA, WITH LONG-TERM CURRENT USE OF INSULIN (HCC): ICD-10-CM

## 2024-12-17 DIAGNOSIS — E11.65 TYPE 2 DIABETES MELLITUS WITH HYPERGLYCEMIA, WITH LONG-TERM CURRENT USE OF INSULIN (HCC): ICD-10-CM

## 2024-12-17 DIAGNOSIS — E66.01 CLASS 2 SEVERE OBESITY DUE TO EXCESS CALORIES WITH SERIOUS COMORBIDITY AND BODY MASS INDEX (BMI) OF 37.0 TO 37.9 IN ADULT (HCC): ICD-10-CM

## 2024-12-17 DIAGNOSIS — Z12.11 COLON CANCER SCREENING: ICD-10-CM

## 2024-12-17 PROCEDURE — 99214 OFFICE O/P EST MOD 30 MIN: CPT | Performed by: INTERNAL MEDICINE

## 2024-12-17 RX ORDER — CHOLESTYRAMINE LIGHT 4 G/5.7G
4 POWDER, FOR SUSPENSION ORAL 2 TIMES DAILY
Qty: 180 PACKET | Refills: 3 | Status: SHIPPED | OUTPATIENT
Start: 2024-12-17 | End: 2025-12-12

## 2024-12-17 NOTE — PROGRESS NOTES
Name: Paxton Olson Jr.      : 1965      MRN: [id number]  Encounter Provider: Dulce Maria Hernández MD  Encounter Date: 2024   Encounter department: [facility]  :  Assessment & Plan  Functional diarrhea  Doing well. Limiting dairy. Only using questran 1-2 times a day now.     Orders:  •  cholestyramine sugar free (QUESTRAN LIGHT) 4 g packet; Take 1 packet (4 g total) by mouth 2 (two) times a day    Type 2 diabetes mellitus with hyperglycemia, with long-term current use of insulin ([facility])    Lab Results   Component Value Date    HGBA1C 8.4 (H) 2024   Continue diabetes control as this will help with the erratic bowel habits.  On insulin.       Class 2 severe obesity due to excess calories with serious comorbidity and body mass index (BMI) of 37.0 to 37.9 in adult ([facility])  Weight loss       Colon cancer screening  Utd, recall            History of Present Illness     Paxton Olson Jr. is a 59 y.o. male who presents today for f/u. Doing well. No complaints. Down to just using questran 1-2 times a day. Still limiting dairy. Weight is stable. Trying to control his diabetes, currently started mounjaro.    History obtained from: patient    Review of Systems   All other systems reviewed and are negative.    Past Medical History   Past Medical History:   Diagnosis Date   • Benign prostatic hyperplasia 2021   • BPH (benign prostatic hyperplasia)    • Diabetes mellitus (HCC)    • Diabetic polyneuropathy associated with type 2 diabetes mellitus ([facility]) 2018   • Herniated lumbar disc without myelopathy 2014   • Hyperlipidemia 2018   • Lumbar herniated disc    • Morbid obesity ([facility]) 2021   • Non-pressure chronic ulcer of other part of left foot with unspecified severity ([facility]) 2021   • Osteoarthritis    • Polyneuropathy 2021   • Type 2 diabetes mellitus with hyperglycemia, with long-term current use of insulin ([facility]) 2018      Past Surgical History:   Procedure Laterality Date   • BELOW KNEE LEG AMPUTATION Left    • COLONOSCOPY      approx 4-5 years ago     Family History   Problem Relation Age of Onset   • No Known Problems Mother    • Diabetes unspecified Father    • Heart disease Father         post CABG   • Diabetes type II Father    • No Known Problems Brother       reports that he has been smoking cigarettes. He has a 40 pack-year smoking history. He has never used smokeless tobacco. He reports that he does not currently use alcohol. He reports current drug use. Drug: Marijuana.  Current Outpatient Medications on File Prior to Visit   Medication Sig Dispense Refill   • Accu-Chek Softclix Lancets lancets Check glucose 6 times daily 600 each 3   • albuterol (PROVENTIL HFA,VENTOLIN HFA) 90 mcg/act inhaler if needed     • Aspirin Low Dose 81 MG chewable tablet Chew 81 mg daily     • B-D UF III MINI PEN NEEDLES 31G X 5 MM MISC USE 4 PEN NEEDLES DAILY 400 each 1   • Blood Glucose Monitoring Suppl (Accu-Chek Guide) w/Device KIT Use in the morning 1 kit 0   • cilostazol (PLETAL) 100 mg tablet Take 100 mg by mouth 2 (two) times a day     • citalopram (CeleXA) 20 mg tablet TAKE 1 tablet Orally Once a day 90 days     • Continuous Glucose  (Dexcom G7 ) BERNARDINO Use 1 Device continuous To monitor blood sugar 1 each 0   • Continuous Glucose Sensor (Dexcom G7 Sensor) Use 1 Device every 10 days 3 each 11   • finasteride (PROSCAR) 5 mg tablet Take 5 mg by mouth daily     • furosemide (LASIX) 40 mg tablet Take 40 mg by mouth daily     • gabapentin (NEURONTIN) 600 MG tablet Take 1,200 mg by mouth 2 (two) times a day     • glipiZIDE (GLUCOTROL) 5 mg tablet TAKE 2 TABLETS BY MOUTH TWICE A  tablet 1   • glucose blood (Accu-Chek Guide) test strip CHECK GLUCOSE 6 TIMES DAILY 600 strip 1   • insulin aspart (NovoLOG FlexPen) 100 UNIT/ML injection pen INJECT INSULIN UTILIZING SLIDING SCALE PRIOR TO EACH MEAL, USING UP TO 60 UNITS DAILY.  60 mL 2   • insulin degludec (Tresiba FlexTouch) 100 units/mL injection pen 45 units daily 15 mL 2   • insulin detemir (LEVEMIR) 100 units/mL subcutaneous injection Inject 50 Units under the skin daily at bedtime     • Insulin Pen Needle 31G X 6 MM MISC Use twice daily 100 each 3   • lisinopril (ZESTRIL) 10 mg tablet Take 10 mg by mouth daily     • loratadine (CLARITIN) 10 mg tablet Take 10 mg by mouth daily     • metFORMIN (GLUCOPHAGE) 1000 MG tablet TAKE 1 TABLET BY MOUTH TWICE A DAY WITH MEALS 180 tablet 3   • metoprolol succinate (TOPROL-XL) 25 mg 24 hr tablet Take 25 mg by mouth daily     • omeprazole (PriLOSEC) 20 mg delayed release capsule Take 20 mg by mouth daily     • oxyCODONE (OxyCONTIN) 10 mg 12 hr tablet Take 10 mg by mouth every 12 (twelve) hours     • simvastatin (ZOCOR) 20 mg tablet Take 20 mg by mouth       • tirzepatide (Mounjaro) 5 MG/0.5ML Inject 0.5 mL (5 mg total) under the skin every 7 days 2 mL 2   • [DISCONTINUED] cholestyramine sugar free (QUESTRAN LIGHT) 4 g packet Take 1 packet (4 g total) by mouth 3 (three) times a day 270 packet 3   • [DISCONTINUED] amoxicillin-clavulanate (AUGMENTIN) 875-125 mg per tablet TAKE ONE TABLET BY MOUTH TWICE DAILY FOR additional 7 days (Patient not taking: Reported on 12/17/2024)     • [DISCONTINUED] fluticasone (FLONASE) 50 mcg/act nasal spray 1 spray into each nostril 2 (two) times a day (Patient not taking: Reported on 6/6/2024) 11.1 mL 0   • [DISCONTINUED] gabapentin (NEURONTIN) 400 mg capsule Take 2 PO BID. (Patient not taking: Reported on 12/17/2024) 360 capsule 3   • [DISCONTINUED] lisinopril-hydrochlorothiazide (PRINZIDE,ZESTORETIC) 10-12.5 MG per tablet Take 1 tablet by mouth daily (Patient not taking: Reported on 12/17/2024)     • [DISCONTINUED] tirzepatide (Mounjaro) 2.5 MG/0.5ML Inject 0.5 mL (2.5 mg total) under the skin every 7 days (Patient not taking: Reported on 12/17/2024) 2 mL 5     No current facility-administered medications on file  prior to visit.   No Known Allergies   Current Outpatient Medications on File Prior to Visit   Medication Sig Dispense Refill   • Accu-Chek Softclix Lancets lancets Check glucose 6 times daily 600 each 3   • albuterol (PROVENTIL HFA,VENTOLIN HFA) 90 mcg/act inhaler if needed     • Aspirin Low Dose 81 MG chewable tablet Chew 81 mg daily     • B-D UF III MINI PEN NEEDLES 31G X 5 MM MISC USE 4 PEN NEEDLES DAILY 400 each 1   • Blood Glucose Monitoring Suppl (Accu-Chek Guide) w/Device KIT Use in the morning 1 kit 0   • cilostazol (PLETAL) 100 mg tablet Take 100 mg by mouth 2 (two) times a day     • citalopram (CeleXA) 20 mg tablet TAKE 1 tablet Orally Once a day 90 days     • Continuous Glucose  (Dexcom G7 ) BERNARDINO Use 1 Device continuous To monitor blood sugar 1 each 0   • Continuous Glucose Sensor (Dexcom G7 Sensor) Use 1 Device every 10 days 3 each 11   • finasteride (PROSCAR) 5 mg tablet Take 5 mg by mouth daily     • furosemide (LASIX) 40 mg tablet Take 40 mg by mouth daily     • gabapentin (NEURONTIN) 600 MG tablet Take 1,200 mg by mouth 2 (two) times a day     • glipiZIDE (GLUCOTROL) 5 mg tablet TAKE 2 TABLETS BY MOUTH TWICE A  tablet 1   • glucose blood (Accu-Chek Guide) test strip CHECK GLUCOSE 6 TIMES DAILY 600 strip 1   • insulin aspart (NovoLOG FlexPen) 100 UNIT/ML injection pen INJECT INSULIN UTILIZING SLIDING SCALE PRIOR TO EACH MEAL, USING UP TO 60 UNITS DAILY. 60 mL 2   • insulin degludec (Tresiba FlexTouch) 100 units/mL injection pen 45 units daily 15 mL 2   • insulin detemir (LEVEMIR) 100 units/mL subcutaneous injection Inject 50 Units under the skin daily at bedtime     • Insulin Pen Needle 31G X 6 MM MISC Use twice daily 100 each 3   • lisinopril (ZESTRIL) 10 mg tablet Take 10 mg by mouth daily     • loratadine (CLARITIN) 10 mg tablet Take 10 mg by mouth daily     • metFORMIN (GLUCOPHAGE) 1000 MG tablet TAKE 1 TABLET BY MOUTH TWICE A DAY WITH MEALS 180 tablet 3   • metoprolol  succinate (TOPROL-XL) 25 mg 24 hr tablet Take 25 mg by mouth daily     • omeprazole (PriLOSEC) 20 mg delayed release capsule Take 20 mg by mouth daily     • oxyCODONE (OxyCONTIN) 10 mg 12 hr tablet Take 10 mg by mouth every 12 (twelve) hours     • simvastatin (ZOCOR) 20 mg tablet Take 20 mg by mouth       • tirzepatide (Mounjaro) 5 MG/0.5ML Inject 0.5 mL (5 mg total) under the skin every 7 days 2 mL 2   • [DISCONTINUED] cholestyramine sugar free (QUESTRAN LIGHT) 4 g packet Take 1 packet (4 g total) by mouth 3 (three) times a day 270 packet 3   • [DISCONTINUED] amoxicillin-clavulanate (AUGMENTIN) 875-125 mg per tablet TAKE ONE TABLET BY MOUTH TWICE DAILY FOR additional 7 days (Patient not taking: Reported on 12/17/2024)     • [DISCONTINUED] fluticasone (FLONASE) 50 mcg/act nasal spray 1 spray into each nostril 2 (two) times a day (Patient not taking: Reported on 6/6/2024) 11.1 mL 0   • [DISCONTINUED] gabapentin (NEURONTIN) 400 mg capsule Take 2 PO BID. (Patient not taking: Reported on 12/17/2024) 360 capsule 3   • [DISCONTINUED] lisinopril-hydrochlorothiazide (PRINZIDE,ZESTORETIC) 10-12.5 MG per tablet Take 1 tablet by mouth daily (Patient not taking: Reported on 12/17/2024)     • [DISCONTINUED] tirzepatide (Mounjaro) 2.5 MG/0.5ML Inject 0.5 mL (2.5 mg total) under the skin every 7 days (Patient not taking: Reported on 12/17/2024) 2 mL 5     No current facility-administered medications on file prior to visit.         Objective   /68 (BP Location: Right arm, Patient Position: Sitting, Cuff Size: Large)      Physical Exam  Vitals and nursing note reviewed.   Constitutional:       General: He is not in acute distress.     Appearance: He is well-developed. He is obese.   HENT:      Head: Normocephalic and atraumatic.   Eyes:      Conjunctiva/sclera: Conjunctivae normal.   Cardiovascular:      Rate and Rhythm: Normal rate and regular rhythm.      Heart sounds: No murmur heard.  Pulmonary:      Effort: Pulmonary  effort is normal. No respiratory distress.      Breath sounds: Normal breath sounds.   Abdominal:      General: Bowel sounds are normal. There is no distension.      Palpations: Abdomen is soft.      Tenderness: There is no abdominal tenderness.   Musculoskeletal:         General: No swelling.      Cervical back: Neck supple.      Left lower leg: Deformity present.      Comments: amputation   Skin:     General: Skin is warm and dry.   Neurological:      General: No focal deficit present.      Mental Status: He is alert and oriented to person, place, and time.   Psychiatric:         Mood and Affect: Mood normal.

## 2024-12-17 NOTE — ASSESSMENT & PLAN NOTE
Doing well. Limiting dairy. Only using questran 1-2 times a day now.     Orders:  •  cholestyramine sugar free (QUESTRAN LIGHT) 4 g packet; Take 1 packet (4 g total) by mouth 2 (two) times a day

## 2024-12-17 NOTE — ASSESSMENT & PLAN NOTE
Lab Results   Component Value Date    HGBA1C 8.4 (H) 12/07/2024   Continue diabetes control as this will help with the erratic bowel habits.  On insulin.

## 2024-12-20 ENCOUNTER — OFFICE VISIT (OUTPATIENT)
Dept: ENDOCRINOLOGY | Facility: HOSPITAL | Age: 59
End: 2024-12-20
Payer: COMMERCIAL

## 2024-12-20 ENCOUNTER — TELEPHONE (OUTPATIENT)
Age: 59
End: 2024-12-20

## 2024-12-20 VITALS — OXYGEN SATURATION: 96 % | SYSTOLIC BLOOD PRESSURE: 118 MMHG | HEART RATE: 97 BPM | DIASTOLIC BLOOD PRESSURE: 70 MMHG

## 2024-12-20 DIAGNOSIS — Z79.4 TYPE 2 DIABETES MELLITUS WITH HYPERGLYCEMIA, WITH LONG-TERM CURRENT USE OF INSULIN (HCC): Primary | ICD-10-CM

## 2024-12-20 DIAGNOSIS — Z89.512 HX OF LEFT BKA (HCC): ICD-10-CM

## 2024-12-20 DIAGNOSIS — E11.65 TYPE 2 DIABETES MELLITUS WITH HYPERGLYCEMIA, WITH LONG-TERM CURRENT USE OF INSULIN (HCC): Primary | ICD-10-CM

## 2024-12-20 DIAGNOSIS — E78.5 DYSLIPIDEMIA: ICD-10-CM

## 2024-12-20 DIAGNOSIS — Z79.4 TYPE 2 DIABETES MELLITUS WITH HYPERGLYCEMIA, WITH LONG-TERM CURRENT USE OF INSULIN (HCC): ICD-10-CM

## 2024-12-20 DIAGNOSIS — E11.65 TYPE 2 DIABETES MELLITUS WITH HYPERGLYCEMIA, WITH LONG-TERM CURRENT USE OF INSULIN (HCC): ICD-10-CM

## 2024-12-20 DIAGNOSIS — E11.42 DIABETIC POLYNEUROPATHY ASSOCIATED WITH TYPE 2 DIABETES MELLITUS (HCC): ICD-10-CM

## 2024-12-20 PROCEDURE — 99214 OFFICE O/P EST MOD 30 MIN: CPT | Performed by: PHYSICIAN ASSISTANT

## 2024-12-20 PROCEDURE — 95251 CONT GLUC MNTR ANALYSIS I&R: CPT | Performed by: PHYSICIAN ASSISTANT

## 2024-12-20 RX ORDER — PREDNISONE 10 MG/1
TABLET ORAL
COMMUNITY
Start: 2024-12-19

## 2024-12-20 RX ORDER — TIRZEPATIDE 7.5 MG/.5ML
7.5 INJECTION, SOLUTION SUBCUTANEOUS WEEKLY
Qty: 6 ML | Refills: 1 | Status: SHIPPED | OUTPATIENT
Start: 2024-12-20

## 2024-12-20 RX ORDER — INSULIN ASPART 100 [IU]/ML
45 INJECTION, SOLUTION INTRAVENOUS; SUBCUTANEOUS
Qty: 120 ML | Refills: 1 | Status: SHIPPED | OUTPATIENT
Start: 2024-12-20

## 2024-12-20 RX ORDER — INSULIN DEGLUDEC 100 U/ML
50 INJECTION, SOLUTION SUBCUTANEOUS DAILY
Qty: 45 ML | Refills: 2 | Status: SHIPPED | OUTPATIENT
Start: 2024-12-20

## 2024-12-20 RX ORDER — INSULIN ASPART 100 [IU]/ML
45 INJECTION, SOLUTION INTRAVENOUS; SUBCUTANEOUS
Qty: 120 ML | Refills: 1 | Status: SHIPPED | OUTPATIENT
Start: 2024-12-20 | End: 2024-12-20 | Stop reason: SDUPTHER

## 2024-12-20 NOTE — TELEPHONE ENCOUNTER
Maine from [facility] called in stating that she wanted to discuss a prescription for insulin that was recently put in. Please call them as soon as possible.

## 2024-12-20 NOTE — PATIENT INSTRUCTIONS
Continue to monitor diet, maintain physical activity.  Make sure to drink plenty of water throughout the day.     Tresiba to 50 units daily, and change Novolog to 30-45 units with meals.     Increase Mounjaro to 7.5 mg weekly.     Continue with simvastatin for hyperlipidemia.     Continue to use Dexcom and would like to see a download in about 2-4 weeks.     Get lab work completed prior to 3 month follow-up.

## 2024-12-20 NOTE — PROGRESS NOTES
Paxton Olson Jr. 59 y.o. male MRN: [id number]    Encounter: [id number]      Assessment & Plan     Assessment:  This is a 59 y.o.-year-old male with type 2 diabetes with neuropathy, hypertension and hyperlipidemia.    Plan:  1. Type 2 diabetes: Recent hemoglobin A1c was 8.4.  Review of his Dexcom from last office visit to this office visit, I am surprised that his A1c is still this time what I have expected his A1c to be in the sevens.  That being said since it is heading in the right direction we will continue continue to monitor.  I will order fructosamine level just in case.  At this time I would like to decrease his Tresiba to 48 units as he is having some episodes of hypoglycemia overnight.  Will increase his Mounjaro to 7.5 mg weekly.  Continue with the same dose of NovoLog.  He is having some problems with phenomenon would recommend trying a small snack before bed 15 g carbs with protein, or having a protein bar first thing when he wakes up.  Continue utilizing Dexcom to monitor glucose levels.  Contact the office with any concerns or questions.  Follow-up in 3 months with labwork completed prior to visit.  If everything is doing well at that time, we can consider extending out office visits.     Patient is a type II diabetic currently utilizing for insulin injections a day.  He is utilizing a sliding scale when it comes to mealtime insulin.  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  Because of these factors, I think it would be extremely beneficial for him to start utilizing a CGM such as a Dexcom to monitor glucose levels, help improve A1c and prevent future complications.     2. Diabetic neuropathy: Recent amputation of the left foot.      3. Hypertension:  Normotensive in the office today.  Kidney function remains stable with normal electrolytes.  Continue with current medications.  Repeat CMP prior to next office visit.       4. Hyperlipidemia:  Cholesterol doing well.  Will continue with current  medication.  Repeat lipid panel prior to next office visit.    CC: Type 2 diabetes follow-up    History of Present Illness     HPI:  Paxton Olson Jr. is a 59 year old male with type 2 diabetes diagnosed around 2024.  He is on oral agents and insulin at home and takes Metformin  mg 2 tablets twice a day, and glipizide 5 mg 2 tablets twice a day, Mounjaro 5 mg weekly, levemir 50 units daily, and Humalog using a sliding scale and up to 30 units daily.  Previously on Farxiga and Jardiance, but discontinued this class of medication as they have been too expensive. He denies nephropathy, retinopathy, heart attack, stroke and claudication but does admit to neuropathy, recurrent ulcers, polydipsia, polyphagia, polyuria.  As stated above he had an amputation of his left foot around January 2024.  Is in the process of getting her prosthesis.  Has concerns with cost of Mounjaro.  As of right now he has met his deductible so medication is affordable.  Beginning of next year, he is not sure if he is going to be able to afford the medication.     Hypoglycemic episodes: No.  Couple times a week either overnight or in the afternoon.  Does keep fast acting carbohydrates on hand.     The patient's last eye exam was 2024 the patient's last foot exam was in April 2022 at endocrine office, but does follow-up with wound care as he had a wound on his right foot and recently had an amputation of the left foot.     Blood Sugar/Glucometer/Pump/CGM review: Downloaded Dexcom from December 7 through December 20, 2024 reveals an average glucose level of 162 with a standard deviation of 46.  He is in target range 62% time, above target range 34% of time, below target range 4% time.  Glucose levels typically trend down overnight reaching their lowest between 2 AM and 4 AM.  At which point they will start trending up.  Also around this time he may have episodes of hypoglycemia so there may be some overcorrection.  Will slowly increase and  usually highest blood sugars are around 12 PM and start trending down in the afternoon up until about 4 PM.  Will then slightly increase after dinner around 6 PM.      He has hypertension and takes lisinopril / HCTZ 10/12.5 mg daily.  He has hyperlipidemia and takes simvastatin 20 mg daily.  Denies any headaches, chest pain, MI or stroke-like symptoms.     Review of Systems   Constitutional:  Negative for chills, fatigue and fever.   HENT:  Negative for congestion, ear pain, hearing loss, rhinorrhea and sore throat.    Eyes:  Negative for pain and visual disturbance.   Respiratory:  Positive for wheezing. Negative for cough and shortness of breath.    Cardiovascular:  Negative for chest pain, palpitations and leg swelling.   Gastrointestinal:  Negative for abdominal pain, blood in stool, constipation, diarrhea, nausea and vomiting.   Endocrine: Negative for cold intolerance, heat intolerance and polyuria.   Genitourinary:  Negative for difficulty urinating, dysuria, frequency, hematuria and urgency.   Musculoskeletal:  Positive for gait problem (utilizing wheelchair). Negative for arthralgias, joint swelling and myalgias.        Left BKA   Skin:  Negative for rash and wound.   Neurological:  Negative for dizziness, syncope, weakness, numbness and headaches.   Psychiatric/Behavioral:  Negative for dysphoric mood and sleep disturbance. The patient is not nervous/anxious.        Historical Information   Past Medical History:   Diagnosis Date   • Benign prostatic hyperplasia 12/13/2021   • BPH (benign prostatic hyperplasia)    • Diabetes mellitus ([facility])    • Diabetic polyneuropathy associated with type 2 diabetes mellitus ([facility]) 07/30/2018   • Herniated lumbar disc without myelopathy 11/25/2014   • Hyperlipidemia 07/30/2018   • Lumbar herniated disc    • Morbid obesity ([facility]) 12/13/2021   • Non-pressure chronic ulcer of other part of left foot with unspecified severity ([facility]) 12/13/2021   • Osteoarthritis    •  "Polyneuropathy 12/13/2021   • Type 2 diabetes mellitus with hyperglycemia, with long-term current use of insulin ([facility]) 07/30/2018     Past Surgical History:   Procedure Laterality Date   • BELOW KNEE LEG AMPUTATION Left    • COLONOSCOPY      approx 4-5 years ago     Social History   Social History     Substance and Sexual Activity   Alcohol Use Not Currently    Comment: \"Not much\" per pt     Social History     Substance and Sexual Activity   Drug Use Yes   • Types: Marijuana    Comment: \"Not much\" per pt     Social History     Tobacco Use   Smoking Status Every Day   • Current packs/day: 1.00   • Average packs/day: 1 pack/day for 40.0 years (40.0 ttl pk-yrs)   • Types: Cigarettes   Smokeless Tobacco Never     Family History:   Family History   Problem Relation Age of Onset   • No Known Problems Mother    • Diabetes unspecified Father    • Heart disease Father         post CABG   • Diabetes type II Father    • No Known Problems Brother        Meds/Allergies   Current Outpatient Medications   Medication Sig Dispense Refill   • Accu-Chek Softclix Lancets lancets Check glucose 6 times daily 600 each 3   • albuterol (PROVENTIL HFA,VENTOLIN HFA) 90 mcg/act inhaler if needed     • Aspirin Low Dose 81 MG chewable tablet Chew 81 mg daily     • B-D UF III MINI PEN NEEDLES 31G X 5 MM MISC USE 4 PEN NEEDLES DAILY 400 each 1   • Blood Glucose Monitoring Suppl (Accu-Chek Guide) w/Device KIT Use in the morning 1 kit 0   • cholestyramine sugar free (QUESTRAN LIGHT) 4 g packet Take 1 packet (4 g total) by mouth 2 (two) times a day 180 packet 3   • cilostazol (PLETAL) 100 mg tablet Take 100 mg by mouth 2 (two) times a day     • citalopram (CeleXA) 20 mg tablet TAKE 1 tablet Orally Once a day 90 days     • Continuous Glucose  (Dexcom G7 ) BERNARDINO Use 1 Device continuous To monitor blood sugar 1 each 0   • Continuous Glucose Sensor (Dexcom G7 Sensor) Use 1 Device every 10 days 3 each 11   • finasteride (PROSCAR) 5 mg " tablet Take 5 mg by mouth daily     • furosemide (LASIX) 40 mg tablet Take 40 mg by mouth daily     • gabapentin (NEURONTIN) 600 MG tablet Take 1,200 mg by mouth 2 (two) times a day     • glipiZIDE (GLUCOTROL) 5 mg tablet TAKE 2 TABLETS BY MOUTH TWICE A  tablet 1   • glucose blood (Accu-Chek Guide) test strip CHECK GLUCOSE 6 TIMES DAILY 600 strip 1   • insulin aspart (NovoLOG FlexPen) 100 UNIT/ML injection pen Inject 45 Units under the skin 3 (three) times a day with meals INJECT INSULIN UTILIZING SLIDING SCALE PRIOR TO EACH MEAL, USING UP TO 60 UNITS DAILY. 120 mL 1   • insulin degludec (Tresiba FlexTouch) 100 units/mL injection pen Inject 50 Units under the skin daily 50 units daily 45 mL 2   • Insulin Pen Needle 31G X 6 MM MISC Use twice daily 100 each 3   • lisinopril (ZESTRIL) 10 mg tablet Take 10 mg by mouth daily     • loratadine (CLARITIN) 10 mg tablet Take 10 mg by mouth daily     • metFORMIN (GLUCOPHAGE) 1000 MG tablet TAKE 1 TABLET BY MOUTH TWICE A DAY WITH MEALS 180 tablet 3   • metoprolol succinate (TOPROL-XL) 25 mg 24 hr tablet Take 25 mg by mouth daily     • omeprazole (PriLOSEC) 20 mg delayed release capsule Take 20 mg by mouth daily     • oxyCODONE (OxyCONTIN) 10 mg 12 hr tablet Take 10 mg by mouth every 12 (twelve) hours     • predniSONE 10 mg tablet      • simvastatin (ZOCOR) 20 mg tablet Take 20 mg by mouth       • Tirzepatide (Mounjaro) 7.5 MG/0.5ML SOAJ Inject 7.5 mg under the skin once a week 6 mL 1     No current facility-administered medications for this visit.     No Known Allergies    Objective   Vitals: Blood pressure 118/70, pulse 97, SpO2 96%.    Physical Exam  Vitals and nursing note reviewed.   Constitutional:       General: He is not in acute distress.     Appearance: Normal appearance. He is not diaphoretic.   HENT:      Head: Normocephalic and atraumatic.   Eyes:      General: No scleral icterus.     Extraocular Movements: Extraocular movements intact.       Conjunctiva/sclera: Conjunctivae normal.      Pupils: Pupils are equal, round, and reactive to light.   Cardiovascular:      Rate and Rhythm: Normal rate and regular rhythm.      Heart sounds: No murmur heard.  Pulmonary:      Effort: Pulmonary effort is normal. No respiratory distress.      Breath sounds: Wheezing present.   Musculoskeletal:      Right lower leg: No edema.      Comments: Left BKA   Lymphadenopathy:      Cervical: No cervical adenopathy.   Skin:     General: Skin is warm and dry.   Neurological:      Mental Status: He is alert and oriented to person, place, and time. Mental status is at baseline.      Sensory: No sensory deficit.      Gait: Gait abnormal (Utilizing a wheelchair).   Psychiatric:         Mood and Affect: Mood normal.         Behavior: Behavior normal.         Thought Content: Thought content normal.         The history was obtained from the review of the chart, patient.    Lab Results:   Lab Results   Component Value Date/Time    Hemoglobin A1C 8.4 (H) 12/07/2024 09:03 AM    Hemoglobin A1C 9.2 (A) 09/12/2024 02:43 PM    Hemoglobin A1C 8.9 (H) 05/25/2024 07:57 AM    WBC 16.32 (H) 03/26/2024 05:47 PM    Hemoglobin 11.2 (L) 03/26/2024 05:47 PM    Hematocrit 35.6 (L) 03/26/2024 05:47 PM    MCV 81 (L) 03/26/2024 05:47 PM    Platelets 425 (H) 03/26/2024 05:47 PM    BUN 31 (H) 12/07/2024 09:03 AM    BUN 14 08/31/2024 07:35 AM    BUN 19 05/25/2024 07:57 AM    Potassium 4.1 12/07/2024 09:03 AM    Potassium 4.6 08/31/2024 07:35 AM    Potassium 4.6 05/25/2024 07:57 AM    Chloride 98 12/07/2024 09:03 AM    Chloride 99 08/31/2024 07:35 AM    Chloride 103 05/25/2024 07:57 AM    CO2 26 12/07/2024 09:03 AM    CO2 28 08/31/2024 07:35 AM    CO2 28 05/25/2024 07:57 AM    Creatinine 1.04 12/07/2024 09:03 AM    Creatinine 0.88 08/31/2024 07:35 AM    Creatinine 0.83 05/25/2024 07:57 AM    Creatinine 0.90 03/26/2024 05:47 PM    AST 28 12/07/2024 09:03 AM    AST 15 08/31/2024 07:35 AM    AST 29 05/25/2024  "07:57 AM    ALT 29 12/07/2024 09:03 AM    ALT 15 08/31/2024 07:35 AM    ALT 33 05/25/2024 07:57 AM    Protein, Total 7.6 12/07/2024 09:03 AM    Protein, Total 7.4 08/31/2024 07:35 AM    Protein, Total 7.2 05/25/2024 07:57 AM    Albumin 4.1 12/07/2024 09:03 AM    Albumin 3.7 08/31/2024 07:35 AM    Albumin 3.7 05/25/2024 07:57 AM    Albumin 3.9 03/26/2024 05:47 PM    Globulin 3.5 12/07/2024 09:03 AM    Globulin 3.7 08/31/2024 07:35 AM    Globulin 3.5 05/25/2024 07:57 AM    HDL 29 (L) 05/25/2024 07:57 AM    Triglycerides 198 (H) 05/25/2024 07:57 AM           Imaging Studies: Results Review Statement: No pertinent imaging studies reviewed.    Portions of the record may have been created with voice recognition software. Occasional wrong word or \"sound a like\" substitutions may have occurred due to the inherent limitations of voice recognition software. Read the chart carefully and recognize, using context, where substitutions have occurred.    "

## 2024-12-30 DIAGNOSIS — E11.65 TYPE 2 DIABETES MELLITUS WITH HYPERGLYCEMIA, WITH LONG-TERM CURRENT USE OF INSULIN (HCC): ICD-10-CM

## 2024-12-30 DIAGNOSIS — Z79.4 TYPE 2 DIABETES MELLITUS WITH HYPERGLYCEMIA, WITH LONG-TERM CURRENT USE OF INSULIN (HCC): ICD-10-CM

## 2024-12-30 RX ORDER — TIRZEPATIDE 7.5 MG/.5ML
7.5 INJECTION, SOLUTION SUBCUTANEOUS WEEKLY
Qty: 6 ML | Refills: 1 | Status: SHIPPED | OUTPATIENT
Start: 2024-12-30

## 2024-12-30 RX ORDER — INSULIN ASPART 100 [IU]/ML
45 INJECTION, SOLUTION INTRAVENOUS; SUBCUTANEOUS
Qty: 120 ML | Refills: 1 | Status: SHIPPED | OUTPATIENT
Start: 2024-12-30

## 2024-12-30 RX ORDER — INSULIN DEGLUDEC 100 U/ML
50 INJECTION, SOLUTION SUBCUTANEOUS DAILY
Qty: 45 ML | Refills: 2 | Status: SHIPPED | OUTPATIENT
Start: 2024-12-30

## 2024-12-30 NOTE — TELEPHONE ENCOUNTER
Reason for call:   [x] Refill   [] Prior Auth  [x] Other: CHANGE IN PHARMACY. NOT A DUP    Office:   [] PCP/Provider -   [x] Specialty/Provider - Radha/ NANCY Easton    Medication: insulin aspart (NovoLOG FlexPen) 100 UNIT/ML injection pen     Dose/Frequency: Inject 45 Units under the skin 3 (three) times a day with meals    Quantity: 120 mL    Medication: insulin degludec (Tresiba FlexTouch) 100 units/mL injection pen     Dose/Frequency: Inject 50 Units under the skin daily 50 units daily    Quantity: 45 mL    Medication: Tirzepatide (Mounjaro) 7.5 MG/0.5ML SOAJ     Dose/Frequency: Inject 7.5 mg under the skin once a week    Quantity: 6 mL    Pharmacy: [facility]/pharmacy #[id number] - [place] - [address] [phone]    Does the patient have enough for 3 days?   [] Yes   [x] No - Send as HP to POD

## 2025-01-03 DIAGNOSIS — K59.1 FUNCTIONAL DIARRHEA: ICD-10-CM

## 2025-01-03 RX ORDER — CHOLESTYRAMINE LIGHT 4 G/5.7G
4 POWDER, FOR SUSPENSION ORAL 2 TIMES DAILY
Qty: 180 PACKET | Refills: 1 | Status: SHIPPED | OUTPATIENT
Start: 2025-01-03 | End: 2025-12-29

## 2025-01-03 NOTE — TELEPHONE ENCOUNTER
Alternate pharmacy request    Reason for call:   [x] Refill   [] Prior Auth  [x] Other: Alternate pharmacy    Office:   [] PCP/Provider -   [x] Specialty/Provider - Gastro    Medication: cholestyramine sugar free (QUESTRAN LIGHT) 4 g packet     Dose/Frequency: Take 1 packet (4 g total) by mouth 2 (two) times a day     Quantity: 180 packet    Pharmacy: [facility]/pharmacy #[id number] - [place] - [address] [place]     Does the patient have enough for 3 days?   [x] Yes   [] No - Send as HP to POD     Patient discharged at 4:20 PM via ambulation accompanied by significant other and staff. Prescriptions - None ordered for discharge. All belongings sent with patient.     Discharge instructions reviewed with pt and significant other. Listed belongings gathered and returned to patient. Clothing, purse, diaper bag    Patient discharged to home.     Core Measures and Vaccines  Core Measures applicable during stay: no  Pneumonia and Influenza given prior to discharge, if indicated: N/A    Surgical Patient   Surgical Procedures during stay: no  Did patient receive discharge instruction on wound care and recognition of infection symptoms? N/A    MISC  Follow up appointment made:  Yes  Home and hospital aquired medications returned to patient: N/A  Patient reports pain was well managed at discharge: Yes

## 2025-03-17 ENCOUNTER — RESULTS FOLLOW-UP (OUTPATIENT)
Dept: ENDOCRINOLOGY | Facility: HOSPITAL | Age: 60
End: 2025-03-17

## 2025-03-18 LAB
ALBUMIN SERPL-MCNC: 3.8 G/DL (ref 3.6–5.1)
ALBUMIN/GLOB SERPL: 1.1 (CALC) (ref 1–2.5)
ALP SERPL-CCNC: 60 U/L (ref 35–144)
ALT SERPL-CCNC: 15 U/L (ref 9–46)
AST SERPL-CCNC: 18 U/L (ref 10–35)
BILIRUB SERPL-MCNC: 0.3 MG/DL (ref 0.2–1.2)
BUN SERPL-MCNC: 17 MG/DL (ref 7–25)
BUN/CREAT SERPL: ABNORMAL (CALC) (ref 6–22)
CALCIUM SERPL-MCNC: 8.5 MG/DL (ref 8.6–10.3)
CHLORIDE SERPL-SCNC: 99 MMOL/L (ref 98–110)
CHOLEST SERPL-MCNC: 120 MG/DL
CHOLEST/HDLC SERPL: 4.1 (CALC)
CO2 SERPL-SCNC: 29 MMOL/L (ref 20–32)
CREAT SERPL-MCNC: 1.19 MG/DL (ref 0.7–1.35)
FRUCTOSAMINE SERPL-SCNC: 255 UMOL/L (ref 205–285)
GFR/BSA.PRED SERPLBLD CYS-BASED-ARV: 70 ML/MIN/1.73M2
GLOBULIN SER CALC-MCNC: 3.4 G/DL (CALC) (ref 1.9–3.7)
GLUCOSE SERPL-MCNC: 84 MG/DL (ref 65–99)
HBA1C MFR BLD: NORMAL % OF TOTAL HGB
HDLC SERPL-MCNC: 29 MG/DL
LDLC SERPL CALC-MCNC: 56 MG/DL (CALC)
NONHDLC SERPL-MCNC: 91 MG/DL (CALC)
POTASSIUM SERPL-SCNC: 3.8 MMOL/L (ref 3.5–5.3)
PROT SERPL-MCNC: 7.2 G/DL (ref 6.1–8.1)
SODIUM SERPL-SCNC: 140 MMOL/L (ref 135–146)
TRIGL SERPL-MCNC: 333 MG/DL

## 2025-03-19 ENCOUNTER — TELEPHONE (OUTPATIENT)
Age: 60
End: 2025-03-19

## 2025-03-19 NOTE — TELEPHONE ENCOUNTER
Eduardo, from [facility], [facility] labs were collected on 3-15 and HgBA1C could not be run. [facility] specimen type for add on test was not originally collected.

## 2025-03-20 ENCOUNTER — RESULTS FOLLOW-UP (OUTPATIENT)
Dept: ENDOCRINOLOGY | Facility: HOSPITAL | Age: 60
End: 2025-03-20

## 2025-03-27 DIAGNOSIS — E11.65 TYPE 2 DIABETES MELLITUS WITH HYPERGLYCEMIA, WITH LONG-TERM CURRENT USE OF INSULIN (HCC): ICD-10-CM

## 2025-03-27 DIAGNOSIS — Z79.4 TYPE 2 DIABETES MELLITUS WITH HYPERGLYCEMIA, WITH LONG-TERM CURRENT USE OF INSULIN (HCC): ICD-10-CM

## 2025-03-27 RX ORDER — INSULIN ASPART 100 [IU]/ML
45 INJECTION, SOLUTION INTRAVENOUS; SUBCUTANEOUS
Qty: 123 ML | Refills: 1 | Status: SHIPPED | OUTPATIENT
Start: 2025-03-27 | End: 2025-04-09 | Stop reason: CLARIF

## 2025-03-27 RX ORDER — INSULIN DEGLUDEC 100 U/ML
50 INJECTION, SOLUTION SUBCUTANEOUS DAILY
Qty: 45 ML | Refills: 1 | Status: SHIPPED | OUTPATIENT
Start: 2025-03-27 | End: 2025-04-09 | Stop reason: CLARIF

## 2025-03-27 NOTE — TELEPHONE ENCOUNTER
"Patient calling in regards to insulin aspart (NovoLOG FlexPen) 100 UNIT/ML injection pen and insulin degludec (Tresiba FlexTouch) 100 units/mL injection pen     States \"I have about 3-5 days left but would like 90 day supply of my insulins. Im not getting the Mounjaro anymore because its the beginning of the year and they want so much money to fill it.\"    Please advise patient when insulin is refilled as soon he will be out.  "

## 2025-03-27 NOTE — TELEPHONE ENCOUNTER
"Patient calling in regards to needing to cancel appointment today    States \"I am an amputee and my good leg is not having a good day at all.\"    Rescheduled for soonest available with Geoff Easton 5/2.    Patient requesting lab work be reviewed to discuss.    Requesting call after results are reviewed.  "

## 2025-04-09 ENCOUNTER — NURSE TRIAGE (OUTPATIENT)
Age: 60
End: 2025-04-09

## 2025-04-09 DIAGNOSIS — Z79.4 TYPE 2 DIABETES MELLITUS WITH HYPERGLYCEMIA, WITH LONG-TERM CURRENT USE OF INSULIN (HCC): ICD-10-CM

## 2025-04-09 DIAGNOSIS — E11.65 TYPE 2 DIABETES MELLITUS WITH HYPERGLYCEMIA, WITH LONG-TERM CURRENT USE OF INSULIN (HCC): ICD-10-CM

## 2025-04-09 RX ORDER — INSULIN DEGLUDEC 100 U/ML
50 INJECTION, SOLUTION SUBCUTANEOUS DAILY
Qty: 45 ML | Refills: 1 | Status: SHIPPED | OUTPATIENT
Start: 2025-04-09

## 2025-04-09 RX ORDER — INSULIN ASPART 100 [IU]/ML
45 INJECTION, SOLUTION INTRAVENOUS; SUBCUTANEOUS
Qty: 123 ML | Refills: 1 | Status: SHIPPED | OUTPATIENT
Start: 2025-04-09

## 2025-04-09 NOTE — TELEPHONE ENCOUNTER
"Answer Assessment - Initial Assessment Questions  1. NAME of MEDICINE: \"What medicine(s) are you calling about?\"      Novolog and Tresiba  2. QUESTION: \"What is your question?\" (e.g., double dose of medicine, side effect)      Please send to [facility] instead of [facility]. (90 day supply)  3. PRESCRIBER: \"Who prescribed the medicine?\" Reason: if prescribed by specialist, call should be referred to that group.      Geoff Easton PA-C  4. SYMPTOMS: \"Do you have any symptoms?\" If Yes, ask: \"What symptoms are you having?\"  \"How bad are the symptoms (e.g., mild, moderate, severe)      N/A  5. PREGNANCY:  \"Is there any chance that you are pregnant?\" \"When was your last menstrual period?\"      N/A    Protocols used: Medication Question Call-Adult-[place]    "

## 2025-05-03 DIAGNOSIS — E11.65 TYPE 2 DIABETES MELLITUS WITH HYPERGLYCEMIA, WITHOUT LONG-TERM CURRENT USE OF INSULIN (HCC): ICD-10-CM

## 2025-05-05 RX ORDER — GLIPIZIDE 5 MG/1
10 TABLET ORAL 2 TIMES DAILY
Qty: 360 TABLET | Refills: 1 | Status: SHIPPED | OUTPATIENT
Start: 2025-05-05

## 2025-06-06 DIAGNOSIS — E11.65 TYPE 2 DIABETES MELLITUS WITH HYPERGLYCEMIA, WITH LONG-TERM CURRENT USE OF INSULIN (HCC): ICD-10-CM

## 2025-06-06 DIAGNOSIS — Z79.4 TYPE 2 DIABETES MELLITUS WITH HYPERGLYCEMIA, WITH LONG-TERM CURRENT USE OF INSULIN (HCC): ICD-10-CM

## 2025-06-09 RX ORDER — ACYCLOVIR 400 MG/1
1 TABLET ORAL
Qty: 3 EACH | Refills: 5 | Status: SHIPPED | OUTPATIENT
Start: 2025-06-09 | End: 2025-06-18